# Patient Record
Sex: MALE | Race: WHITE | Employment: OTHER | URBAN - METROPOLITAN AREA
[De-identification: names, ages, dates, MRNs, and addresses within clinical notes are randomized per-mention and may not be internally consistent; named-entity substitution may affect disease eponyms.]

---

## 2017-02-10 ENCOUNTER — APPOINTMENT (OUTPATIENT)
Dept: GENERAL RADIOLOGY | Age: 72
End: 2017-02-10
Attending: EMERGENCY MEDICINE
Payer: MEDICARE

## 2017-02-10 ENCOUNTER — HOSPITAL ENCOUNTER (EMERGENCY)
Age: 72
Discharge: HOME | End: 2017-02-10
Attending: EMERGENCY MEDICINE
Payer: MEDICARE

## 2017-02-10 VITALS
WEIGHT: 170 LBS | TEMPERATURE: 97.6 F | DIASTOLIC BLOOD PRESSURE: 76 MMHG | BODY MASS INDEX: 23.8 KG/M2 | RESPIRATION RATE: 16 BRPM | HEIGHT: 71 IN | OXYGEN SATURATION: 97 % | HEART RATE: 98 BPM | SYSTOLIC BLOOD PRESSURE: 107 MMHG

## 2017-02-10 DIAGNOSIS — S39.012A LUMBAR STRAIN, INITIAL ENCOUNTER: Primary | ICD-10-CM

## 2017-02-10 DIAGNOSIS — M54.32 SCIATICA OF LEFT SIDE: ICD-10-CM

## 2017-02-10 PROCEDURE — 73502 X-RAY EXAM HIP UNI 2-3 VIEWS: CPT

## 2017-02-10 PROCEDURE — 74011636637 HC RX REV CODE- 636/637: Performed by: EMERGENCY MEDICINE

## 2017-02-10 PROCEDURE — 99283 EMERGENCY DEPT VISIT LOW MDM: CPT | Performed by: EMERGENCY MEDICINE

## 2017-02-10 PROCEDURE — 72100 X-RAY EXAM L-S SPINE 2/3 VWS: CPT

## 2017-02-10 RX ORDER — PREDNISONE 20 MG/1
60 TABLET ORAL
Status: COMPLETED | OUTPATIENT
Start: 2017-02-10 | End: 2017-02-10

## 2017-02-10 RX ORDER — PREDNISONE 20 MG/1
40 TABLET ORAL DAILY
Qty: 10 TAB | Refills: 0 | Status: SHIPPED | OUTPATIENT
Start: 2017-02-10 | End: 2017-02-15

## 2017-02-10 RX ADMIN — PREDNISONE 60 MG: 20 TABLET ORAL at 17:34

## 2017-02-10 NOTE — ED TRIAGE NOTES
Pt in c/o lower back pain radiating into left leg after slip and fall. Pt states multiple falls recently. States history of multiple surgeries. States told to come to ed by orthopedic surgeon for evaluation. Denies loc.

## 2017-02-10 NOTE — ED PROVIDER NOTES
HPI Comments: 67-year-old male presents with an exacerbation of left lower back pain. Has a history of multiple falls. He is had 7 back surgeries including fusions and spinal rodding. Patient states that he had to accidental falls in the last week, one where he slipped on water from a dog's bowl another when he lost his balance while trying to tie his shoe. He has a history of a right foot drop with 100%, nerve damage on that side which is chronic, uses his cane mostly and occasionally will use a walker. On the advice of his surgeon. He was sent to the ER for evaluation of his pain today. He reports that he has had some radicular symptoms after the fall 4 days ago but these have slowly improved. Patient is a 80 y.o. male presenting with back pain. The history is provided by the patient. Back Pain    This is a recurrent problem. The current episode started more than 2 days ago. The problem has been gradually improving. The problem occurs constantly. Patient reports not work related injury. The pain is associated with a fall. The pain is present in the lumbar spine, lower back and left side. The quality of the pain is described as shooting and aching. The pain radiates to the left thigh. The pain is moderate. The symptoms are aggravated by certain positions. The pain is the same all the time. Associated symptoms include tingling. Pertinent negatives include no chest pain, no fever, no numbness, no weight loss, no headaches, no abdominal pain, no abdominal swelling, no perianal numbness, no bladder incontinence, no dysuria and no paresis. He has tried analgesics for the symptoms. Risk factors include a history of cancer. The patient's surgical history includes laminectomy and spinal fusion.        Past Medical History:   Diagnosis Date    Allergic rhinitis, cause unspecified 3/8/2013    CAD (coronary artery disease) 1/3/2013     MI age 62- no intervention; affirms SOB with 1 flight of steps    Cancer [facility])     Chronic pain      lumbar pain    Dizziness 3/8/2013    Esophageal stenosis 8/16/2013     dxed on EGD early 2000's. Never dilated    Esophagitis 1/3/2013    GERD with esophagitis      not completely controlled with zantac    HTN (hypertension) 1/3/2013     controlled with med    Kidney stones 1/3/2013    Narcolepsy 1/3/2013    Prostate cancer ([address] 1/3/2013       Past Surgical History:   Procedure Laterality Date    Hx carpal tunnel release      Hx cholecystectomy  2008?  Hx lithotripsy      Hx prostatectomy  2008? Dr. Gordon Palma Hx orthopaedic  2004     carpel tunnel/left hand    Hx orthopaedic  1995     carpel tunnel/right hand    Pr neurological procedure unlisted       Laminectomy    Hx lumbar laminectomy       x6 Dr. Nikos Engle         Family History:   Problem Relation Age of Onset    Depression Mother     Alcohol abuse Father     Depression Sister     Depression Brother     Depression Brother     Depression Sister     Depression Sister        Social History     Social History    Marital status:      Spouse name: N/A    Number of children: N/A    Years of education: N/A     Occupational History    Not on file. 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 Review of Systems   Constitutional: Negative for activity change, chills, diaphoresis, fever and weight loss. HENT: Negative for dental problem, hearing loss, nosebleeds, rhinorrhea and sore throat.     Eyes: Negative for pain, discharge, redness and visual disturbance. Respiratory: Negative for cough, chest tightness and shortness of breath. Cardiovascular: Negative for chest pain, palpitations and leg swelling. Gastrointestinal: Negative for abdominal pain, constipation, diarrhea, nausea and vomiting. Endocrine: Negative for cold intolerance, heat intolerance, polydipsia and polyuria. Genitourinary: Negative for bladder incontinence, dysuria and flank pain. Musculoskeletal: Positive for back pain. Negative for arthralgias, joint swelling, myalgias and neck pain. Skin: Negative for pallor and rash. Allergic/Immunologic: Negative for environmental allergies and food allergies. Neurological: Positive for tingling. Negative for dizziness, tremors, light-headedness, numbness and headaches. Hematological: Negative for adenopathy. Does not bruise/bleed easily. Psychiatric/Behavioral: Negative for confusion and dysphoric mood. The patient is not nervous/anxious and is not hyperactive. All other systems reviewed and are negative. Vitals:    02/10/17 1544 02/10/17 1736   BP: 104/77 107/76   Pulse: 82 98   Resp: 19 16   Temp: 98.1 °F (36.7 °C) 97.6 °F (36.4 °C)   SpO2: 96% 97%   Weight: 77.1 kg (170 lb)    Height: 5' 11\" (1.803 m)             Physical Exam   Constitutional: He is oriented to person, place, and time. He appears well-developed and well-nourished. He appears distressed. HENT:   Head: Normocephalic and atraumatic. Mouth/Throat: Oropharynx is clear and moist.   Eyes: Conjunctivae and EOM are normal. Pupils are equal, round, and reactive to light. Right eye exhibits no discharge. Left eye exhibits no discharge. No scleral icterus. Neck: Normal range of motion. Neck supple. No JVD present. Cardiovascular: Normal rate, regular rhythm, normal heart sounds and intact distal pulses. Exam reveals no gallop and no friction rub. No murmur heard.   Pulmonary/Chest: Effort normal and breath sounds normal. No respiratory distress. He has no wheezes. Abdominal: Soft. Bowel sounds are normal. He exhibits no distension. There is no tenderness. There is no rebound and no guarding. Musculoskeletal: Normal range of motion. He exhibits no edema or tenderness. Back:    Lymphadenopathy:     He has no cervical adenopathy. Neurological: He is alert and oriented to person, place, and time. He has normal strength. No cranial nerve deficit or sensory deficit. He exhibits normal muscle tone. GCS eye subscore is 4. GCS verbal subscore is 5. GCS motor subscore is 6. Patient has a chronic right foot drop. He remains in a brace on that side. The left leg shows mild decrease in sensation laterally  He has no foot drop on the left. His EHL is intact and strong    Distal pulses are strong   Skin: Skin is warm and dry. No rash noted. He is not diaphoretic. No erythema. Psychiatric: He has a normal mood and affect. His speech is normal and behavior is normal. Judgment and thought content normal. Cognition and memory are normal.   Nursing note and vitals reviewed. MDM  Number of Diagnoses or Management Options  Lumbar strain, initial encounter: new and requires workup  Sciatica of left side: established and worsening  Diagnosis management comments: Sciatica versus fracture versus hardware displacement    Consider left hip fracture as well. Radiographs are negative per radiology's reading. We will start short course of steroids to help with inflammation       Amount and/or Complexity of Data Reviewed  Tests in the radiology section of CPT®: ordered and reviewed  Tests in the medicine section of CPT®: ordered and reviewed  Review and summarize past medical records: yes    Risk of Complications, Morbidity, and/or Mortality  Presenting problems: moderate  Diagnostic procedures: moderate  Management options: low  General comments: Elements of this note have been dictated via voice recognition software.   Text and phrases may be limited by the accuracy of the software. The chart has been reviewed, but errors may still be present.       Patient Progress  Patient progress: stable    ED Course       Procedures

## 2017-02-10 NOTE — DISCHARGE INSTRUCTIONS
Sciatica: Care Instructions  Your Care Instructions    Sciatica (say \"fym-WT-tn-kuh\") is an irritation of one of the sciatic nerves, which come from the spinal cord in the lower back. The sciatic nerves and their branches extend down through the buttock to the foot. Sciatica can develop when an injured disc in the back presses against a spinal nerve root. Its main symptom is pain, numbness, or weakness that is often worse in the leg or foot than in the back. Sciatica often will improve and go away with time. Early treatment usually includes medicines and exercises to relieve pain. Follow-up care is a key part of your treatment and safety. Be sure to make and go to all appointments, and call your doctor if you are having problems. It's also a good idea to know your test results and keep a list of the medicines you take. How can you care for yourself at home? · Take pain medicines exactly as directed. ¨ If the doctor gave you a prescription medicine for pain, take it as prescribed. ¨ If you are not taking a prescription pain medicine, ask your doctor if you can take an over-the-counter medicine. · Use heat or ice to relieve pain. ¨ To apply heat, put a warm water bottle, heating pad set on low, or warm cloth on your back. Do not go to sleep with a heating pad on your skin. ¨ To use ice, put ice or a cold pack on the area for 10 to 20 minutes at a time. Put a thin cloth between the ice and your skin. · Avoid sitting if possible, unless it feels better than standing. · Alternate lying down with short walks. Increase your walking distance as you are able to without making your symptoms worse. · Do not do anything that makes your symptoms worse. When should you call for help? Call 911 anytime you think you may need emergency care. For example, call if:  · You are unable to move a leg at all.   Call your doctor now or seek immediate medical care if:  · You have new or worse symptoms in your legs or buttocks. Symptoms may include:  ¨ Numbness or tingling. ¨ Weakness. ¨ Pain. · You lose bladder or bowel control. Watch closely for changes in your health, and be sure to contact your doctor if:  · You are not getting better as expected. Where can you learn more? Go to http://oracio-gage.info/. Enter [phone] in the search box to learn more about \"Sciatica: Care Instructions. \"  Current as of: May 23, 2016  Content Version: 11.1  © 3079-8586 [email or web address]. Care instructions adapted under license by [employer] (which disclaims liability or warranty for this information). If you have questions about a medical condition or this instruction, always ask your healthcare professional. [address] any warranty or liability for your use of this information.

## 2017-02-23 PROCEDURE — 99283 EMERGENCY DEPT VISIT LOW MDM: CPT | Performed by: EMERGENCY MEDICINE

## 2017-02-24 ENCOUNTER — HOSPITAL ENCOUNTER (EMERGENCY)
Age: 72
Discharge: HOME | End: 2017-02-24
Attending: EMERGENCY MEDICINE
Payer: MEDICARE

## 2017-02-24 VITALS
DIASTOLIC BLOOD PRESSURE: 69 MMHG | RESPIRATION RATE: 16 BRPM | BODY MASS INDEX: 24.08 KG/M2 | SYSTOLIC BLOOD PRESSURE: 146 MMHG | WEIGHT: 172 LBS | OXYGEN SATURATION: 100 % | HEIGHT: 71 IN | TEMPERATURE: 98.2 F | HEART RATE: 95 BPM
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
## 2017-02-24 LAB
ATRIAL RATE: 94 BPM
CALCULATED P AXIS, ECG09: 57 DEGREES
CALCULATED R AXIS, ECG10: 61 DEGREES
CALCULATED T AXIS, ECG11: 14 DEGREES
DIAGNOSIS, 93000: NORMAL
DIASTOLIC BP, ECG02: NORMAL MMHG
P-R INTERVAL, ECG05: 140 MS
Q-T INTERVAL, ECG07: 358 MS
QRS DURATION, ECG06: 80 MS
QTC CALCULATION (BEZET), ECG08: 447 MS
SYSTOLIC BP, ECG01: NORMAL MMHG
VENTRICULAR RATE, ECG03: 94 BPM

## 2017-02-24 PROCEDURE — 93005 ELECTROCARDIOGRAM TRACING: CPT | Performed by: EMERGENCY MEDICINE

## 2017-02-24 NOTE — DISCHARGE INSTRUCTIONS
Return with any fevers, vomiting, weakness, numbness, bowel or bladder problems, worsening symptoms, or additional concerns. Follow up with your regular doctor in 5-7 days for reevaluation.

## 2017-02-24 NOTE — ED NOTES
Pt states he had his 7th back surgery in October and fell several times since. All xrays were negative. Was put on prednisone.

## 2017-02-24 NOTE — ED TRIAGE NOTES
Pt presents to ER for heaviness to L LE since this am, he feels as though he can't pick his foot up very well. Recently had back surgery. Pt has been taking his routine meds, spoke to home health RN who recommended ER eval.  Pt states that he has been having CP x 4 hrs as well, pt dismisses this pain stating that it is \"typically gas\".

## 2017-02-24 NOTE — ED PROVIDER NOTES
HPI Comments: 73-year-old woman with a history of pain in his left shin after a fall earlier this evening. Patient was concerned because he has a history of multiple back surgeries and he was worried that he may have reinjured himself. Patient says he is not actually having any back pain nor is he having any weakness, numbness, or tingling. Patient said that he called his primary care doctor who advised him to go to the [facility] department to get evaluated. Patient says yes she saw the primary care doctor earlier today and got a prescription for some steroids. Patient says he then fell after that because he was trying to walk without his cane. He denies any specific injury. Patient told the triage nurse that he was having chest pain but he told me that that is gone and is not actually bothering him. Patient said the pain he had is normal for him and it frequently comes and goes. Patient also expressed concerns that over the last several days he has started to feel like he is getting some left foot drop. Patient has a history of foot drop on his right side and he wears a brace for that. Patient said that is what prompted him to go to his primary care doctor today to get the steroids. Patient said that is not any worse after the fall. Elements of this note were made using speech recognition software. As such, errors of speech recognition may occur. Patient is a 80 y.o. male presenting with leg pain. The history is provided by the patient. Leg Pain           Past Medical History:   Diagnosis Date    Allergic rhinitis, cause unspecified 3/8/2013    CAD (coronary artery disease) 1/3/2013    MI age 62- no intervention; affirms SOB with 1 flight of steps    Cancer [facility])     Chronic pain     lumbar pain    Dizziness 3/8/2013    Esophageal stenosis 8/16/2013    dxed on EGD early 2000's.   Never dilated    Esophagitis 1/3/2013    GERD with esophagitis     not completely controlled with zantac    HTN (hypertension) 1/3/2013    controlled with med    Kidney stones 1/3/2013    Narcolepsy 1/3/2013    Prostate cancer ([facility] [address] 1/3/2013       Past Surgical History:   Procedure Laterality Date    HX CARPAL TUNNEL RELEASE      HX CHOLECYSTECTOMY  2008?  HX LITHOTRIPSY      HX LUMBAR LAMINECTOMY      x6 Dr. Mario Casper HX ORTHOPAEDIC  2004    carpel tunnel/left hand    HX ORTHOPAEDIC  1995    carpel tunnel/right hand    HX PROSTATECTOMY  2008? Dr. Underwood [employer]      Laminectomy         Family History:   Problem Relation Age of Onset    Depression Mother     Alcohol abuse Father     Depression Sister     Depression Brother     Depression Brother     Depression Sister     Depression Sister        Social History     Social History    Marital status:      Spouse name: N/A    Number of children: N/A    Years of education: N/A     Occupational History    Not on file. Social History Main Topics    Smoking status: Never Smoker    Smokeless tobacco: Never Used    Alcohol use No    Drug use: No    Sexual activity: Not on file     Other Topics Concern     Service Yes     reserves - 8 m 1966    Blood Transfusions No    Caffeine Concern Yes     3 pepsis a day    Special Diet No    Exercise Yes     yard work, does some in the bed for neck   [place] Yes     Social History Narrative         ALLERGIES: Review of patient's allergies indicates no known allergies. Review of Systems   Constitutional: Negative for chills, diaphoresis and fever. HENT: Negative for congestion, rhinorrhea and sore throat. Eyes: Negative for redness and visual disturbance. Respiratory: Negative for cough, chest tightness, shortness of breath and wheezing. Cardiovascular: Negative for chest pain and palpitations. Gastrointestinal: Negative for abdominal pain, blood in stool, diarrhea, nausea and vomiting. Endocrine: Negative for polydipsia and polyuria. Genitourinary: Negative for dysuria and hematuria. Musculoskeletal: Negative for arthralgias, myalgias and neck stiffness. Skin: Negative for rash. Allergic/Immunologic: Negative for environmental allergies and food allergies. Neurological: Negative for dizziness, weakness and headaches. Left foot drop   Hematological: Negative for adenopathy. Does not bruise/bleed easily. Psychiatric/Behavioral: Negative for confusion and sleep disturbance. The patient is not nervous/anxious. Vitals:    02/23/17 2359   BP: 155/77   Pulse: 99   Resp: 16   SpO2: 99%   Weight: 78 kg (172 lb)   Height: 5' 11\" (1.803 m)            Physical Exam   Constitutional: He is oriented to person, place, and time. He appears well-developed and well-nourished. HENT:   Head: Normocephalic and atraumatic. Eyes: Conjunctivae and EOM are normal. Pupils are equal, round, and reactive to light. Neck: Normal range of motion. Cardiovascular: Normal rate and regular rhythm. Pulmonary/Chest: Effort normal and breath sounds normal. No respiratory distress. He has no wheezes. He has no rales. He exhibits no tenderness. Abdominal: Soft. Bowel sounds are normal. There is no rebound and no guarding. Musculoskeletal: Normal range of motion. He exhibits no edema or tenderness. Lymphadenopathy:     He has no cervical adenopathy. Neurological: He is alert and oriented to person, place, and time. He has normal reflexes. Patient's left patella and Achilles reflexes are normal.  He has normal vibratory sensation in his left knee and left ankle. He had maybe some slight weakness with dorsiflexion of his left foot but I could not appreciate any significant foot drop. Skin: Skin is warm and dry. Psychiatric: He has a normal mood and affect. Nursing note and vitals reviewed.        MDM  Number of Diagnoses or Management Options  Diagnosis management comments: Patient is oriented the process of being evaluated for his potential foot drop by his primary care doctor. His symptoms tonight are not significant enough that he needs an MRI and he can continue to see if he has a response to the steroids. I will plan to discharge him home.     ED Course       Procedures

## 2017-03-09 ENCOUNTER — HOSPITAL ENCOUNTER (OUTPATIENT)
Dept: MRI IMAGING | Age: 72
Discharge: HOME | End: 2017-03-09
Attending: FAMILY MEDICINE
Payer: MEDICARE

## 2017-03-09 DIAGNOSIS — M21.372 FOOT DROP, LEFT: ICD-10-CM

## 2017-03-09 DIAGNOSIS — M54.40 BACK PAIN OF LUMBAR REGION WITH SCIATICA: ICD-10-CM

## 2017-03-09 DIAGNOSIS — R29.898 WEAKNESS OF FOOT, LEFT: ICD-10-CM

## 2017-03-09 LAB — CREAT BLD-MCNC: 1.8 MG/DL (ref 0.6–1)

## 2017-03-09 PROCEDURE — A9577 INJ MULTIHANCE: HCPCS | Performed by: FAMILY MEDICINE

## 2017-03-09 PROCEDURE — 74011250636 HC RX REV CODE- 250/636: Performed by: FAMILY MEDICINE

## 2017-03-09 PROCEDURE — 72158 MRI LUMBAR SPINE W/O & W/DYE: CPT

## 2017-03-09 PROCEDURE — 82565 ASSAY OF CREATININE: CPT

## 2017-03-09 RX ORDER — SODIUM CHLORIDE 0.9 % (FLUSH) 0.9 %
10 SYRINGE (ML) INJECTION
Status: COMPLETED | OUTPATIENT
Start: 2017-03-09 | End: 2017-03-09

## 2017-03-09 RX ADMIN — Medication 10 ML: at 17:37

## 2017-03-09 RX ADMIN — GADOBENATE DIMEGLUMINE 7.5 ML: 529 INJECTION, SOLUTION INTRAVENOUS at 17:37

## 2017-03-14 PROBLEM — F32.A DEPRESSION: Status: ACTIVE | Noted: 2017-03-14

## 2017-03-14 PROBLEM — R07.9 CHEST PAIN: Status: ACTIVE | Noted: 2017-03-14

## 2017-03-14 PROBLEM — I51.7 CARDIOMEGALY: Status: ACTIVE | Noted: 2017-03-14

## 2017-03-20 NOTE — PROGRESS NOTES
His kidney function was slightly worse after his last scan. We should repeat that in another 3 months. How is he feeling?

## 2017-03-23 NOTE — PROGRESS NOTES
Pt has been in bed, dr Darrel Mercer ordered a nerve conduction test 3-31-17. Pt given response from dr villagran. Pt voiced understanding.

## 2017-04-29 PROBLEM — Z00.00 PREVENTATIVE HEALTH CARE: Status: ACTIVE | Noted: 2017-04-29

## 2017-05-26 ENCOUNTER — HOSPITAL ENCOUNTER (EMERGENCY)
Age: 72
Discharge: HOME | End: 2017-05-26
Attending: EMERGENCY MEDICINE
Payer: MEDICARE

## 2017-05-26 ENCOUNTER — APPOINTMENT (OUTPATIENT)
Dept: GENERAL RADIOLOGY | Age: 72
End: 2017-05-26
Attending: EMERGENCY MEDICINE
Payer: MEDICARE

## 2017-05-26 VITALS
BODY MASS INDEX: 24.64 KG/M2 | WEIGHT: 176 LBS | SYSTOLIC BLOOD PRESSURE: 129 MMHG | OXYGEN SATURATION: 99 % | DIASTOLIC BLOOD PRESSURE: 80 MMHG | TEMPERATURE: 98 F | HEART RATE: 56 BPM | HEIGHT: 71 IN | RESPIRATION RATE: 22 BRPM

## 2017-05-26 DIAGNOSIS — R07.9 CHEST PAIN, UNSPECIFIED TYPE: Primary | ICD-10-CM

## 2017-05-26 DIAGNOSIS — E87.6 HYPOKALEMIA: ICD-10-CM

## 2017-05-26 DIAGNOSIS — E83.42 HYPOMAGNESEMIA: ICD-10-CM

## 2017-05-26 DIAGNOSIS — I25.119 CORONARY ARTERY DISEASE INVOLVING NATIVE HEART WITH ANGINA PECTORIS, UNSPECIFIED VESSEL OR LESION TYPE (HCC): ICD-10-CM

## 2017-05-26 LAB
ANION GAP BLD CALC-SCNC: 5 MMOL/L (ref 7–16)
ATRIAL RATE: 59 BPM
BASOPHILS # BLD AUTO: 0 K/UL (ref 0–0.2)
BASOPHILS # BLD: 1 % (ref 0–2)
BNP SERPL-MCNC: 18 PG/ML
BUN SERPL-MCNC: 14 MG/DL (ref 8–23)
CALCIUM SERPL-MCNC: 9.3 MG/DL (ref 8.3–10.4)
CALCULATED P AXIS, ECG09: 77 DEGREES
CALCULATED R AXIS, ECG10: 54 DEGREES
CALCULATED T AXIS, ECG11: 153 DEGREES
CHLORIDE SERPL-SCNC: 104 MMOL/L (ref 98–107)
CO2 SERPL-SCNC: 32 MMOL/L (ref 21–32)
CREAT SERPL-MCNC: 1.26 MG/DL (ref 0.8–1.5)
DIAGNOSIS, 93000: NORMAL
DIFFERENTIAL METHOD BLD: ABNORMAL
EOSINOPHIL # BLD: 0.2 K/UL (ref 0–0.8)
EOSINOPHIL NFR BLD: 3 % (ref 0.5–7.8)
ERYTHROCYTE [DISTWIDTH] IN BLOOD BY AUTOMATED COUNT: 14.8 % (ref 11.9–14.6)
GLUCOSE SERPL-MCNC: 96 MG/DL (ref 65–100)
HCT VFR BLD AUTO: 35.9 % (ref 41.1–50.3)
HGB BLD-MCNC: 11.9 G/DL (ref 13.6–17.2)
IMM GRANULOCYTES # BLD: 0 K/UL (ref 0–0.5)
IMM GRANULOCYTES NFR BLD AUTO: 0.2 % (ref 0–5)
LACTATE BLD-SCNC: 1.2 MMOL/L (ref 0.5–1.9)
LYMPHOCYTES # BLD AUTO: 48 % (ref 13–44)
LYMPHOCYTES # BLD: 2.6 K/UL (ref 0.5–4.6)
MAGNESIUM SERPL-MCNC: 1.3 MG/DL (ref 1.8–2.4)
MCH RBC QN AUTO: 29.5 PG (ref 26.1–32.9)
MCHC RBC AUTO-ENTMCNC: 33.1 G/DL (ref 31.4–35)
MCV RBC AUTO: 89.1 FL (ref 79.6–97.8)
MONOCYTES # BLD: 0.6 K/UL (ref 0.1–1.3)
MONOCYTES NFR BLD AUTO: 10 % (ref 4–12)
NEUTS SEG # BLD: 2 K/UL (ref 1.7–8.2)
NEUTS SEG NFR BLD AUTO: 38 % (ref 43–78)
P-R INTERVAL, ECG05: 164 MS
PLATELET # BLD AUTO: 217 K/UL (ref 150–450)
PMV BLD AUTO: 9.2 FL (ref 10.8–14.1)
POTASSIUM SERPL-SCNC: 2.3 MMOL/L (ref 3.5–5.1)
Q-T INTERVAL, ECG07: 338 MS
QRS DURATION, ECG06: 78 MS
QTC CALCULATION (BEZET), ECG08: 334 MS
RBC # BLD AUTO: 4.03 M/UL (ref 4.23–5.67)
SODIUM SERPL-SCNC: 141 MMOL/L (ref 136–145)
TROPONIN I BLD-MCNC: 0 NG/ML (ref 0–0.08)
TROPONIN I BLD-MCNC: 0 NG/ML (ref 0–0.08)
VENTRICULAR RATE, ECG03: 59 BPM
WBC # BLD AUTO: 5.4 K/UL (ref 4.3–11.1)

## 2017-05-26 PROCEDURE — 96365 THER/PROPH/DIAG IV INF INIT: CPT | Performed by: EMERGENCY MEDICINE

## 2017-05-26 PROCEDURE — 83735 ASSAY OF MAGNESIUM: CPT | Performed by: EMERGENCY MEDICINE

## 2017-05-26 PROCEDURE — 74011250637 HC RX REV CODE- 250/637: Performed by: EMERGENCY MEDICINE

## 2017-05-26 PROCEDURE — 96368 THER/DIAG CONCURRENT INF: CPT | Performed by: EMERGENCY MEDICINE

## 2017-05-26 PROCEDURE — 71010 XR CHEST PORT: CPT

## 2017-05-26 PROCEDURE — 83880 ASSAY OF NATRIURETIC PEPTIDE: CPT | Performed by: EMERGENCY MEDICINE

## 2017-05-26 PROCEDURE — 99285 EMERGENCY DEPT VISIT HI MDM: CPT | Performed by: EMERGENCY MEDICINE

## 2017-05-26 PROCEDURE — 84484 ASSAY OF TROPONIN QUANT: CPT

## 2017-05-26 PROCEDURE — 74011250636 HC RX REV CODE- 250/636: Performed by: EMERGENCY MEDICINE

## 2017-05-26 PROCEDURE — 80048 BASIC METABOLIC PNL TOTAL CA: CPT | Performed by: EMERGENCY MEDICINE

## 2017-05-26 PROCEDURE — 83605 ASSAY OF LACTIC ACID: CPT

## 2017-05-26 PROCEDURE — 96366 THER/PROPH/DIAG IV INF ADDON: CPT | Performed by: EMERGENCY MEDICINE

## 2017-05-26 PROCEDURE — 93005 ELECTROCARDIOGRAM TRACING: CPT | Performed by: EMERGENCY MEDICINE

## 2017-05-26 PROCEDURE — 85025 COMPLETE CBC W/AUTO DIFF WBC: CPT | Performed by: EMERGENCY MEDICINE

## 2017-05-26 RX ORDER — POTASSIUM CHLORIDE 20 MEQ/1
40 TABLET, EXTENDED RELEASE ORAL
Status: COMPLETED | OUTPATIENT
Start: 2017-05-26 | End: 2017-05-26

## 2017-05-26 RX ORDER — LANOLIN ALCOHOL/MO/W.PET/CERES
400 CREAM (GRAM) TOPICAL ONCE
Status: COMPLETED | OUTPATIENT
Start: 2017-05-26 | End: 2017-05-26

## 2017-05-26 RX ORDER — SODIUM CHLORIDE 9 MG/ML
50-150 INJECTION, SOLUTION INTRAVENOUS CONTINUOUS
Status: DISCONTINUED | OUTPATIENT
Start: 2017-05-26 | End: 2017-05-26 | Stop reason: HOSPADM

## 2017-05-26 RX ORDER — POTASSIUM CHLORIDE 20MEQ/15ML
40 LIQUID (ML) ORAL
Status: COMPLETED | OUTPATIENT
Start: 2017-05-26 | End: 2017-05-26

## 2017-05-26 RX ORDER — LANOLIN ALCOHOL/MO/W.PET/CERES
400 CREAM (GRAM) TOPICAL 2 TIMES DAILY
Qty: 8 TAB | Refills: 0 | Status: SHIPPED | OUTPATIENT
Start: 2017-05-26 | End: 2017-05-30

## 2017-05-26 RX ORDER — MAGNESIUM SULFATE HEPTAHYDRATE 40 MG/ML
2 INJECTION, SOLUTION INTRAVENOUS
Status: COMPLETED | OUTPATIENT
Start: 2017-05-26 | End: 2017-05-26

## 2017-05-26 RX ORDER — POTASSIUM CHLORIDE 750 MG/1
20 TABLET, FILM COATED, EXTENDED RELEASE ORAL 2 TIMES DAILY
Qty: 12 TAB | Refills: 0 | Status: SHIPPED | OUTPATIENT
Start: 2017-05-26 | End: 2017-06-09 | Stop reason: SDUPTHER

## 2017-05-26 RX ORDER — POTASSIUM CHLORIDE 14.9 MG/ML
20 INJECTION INTRAVENOUS
Status: COMPLETED | OUTPATIENT
Start: 2017-05-26 | End: 2017-05-26

## 2017-05-26 RX ADMIN — SODIUM CHLORIDE 75 ML/HR: 900 INJECTION, SOLUTION INTRAVENOUS at 04:35

## 2017-05-26 RX ADMIN — POTASSIUM CHLORIDE 40 MEQ: 20 TABLET, EXTENDED RELEASE ORAL at 04:31

## 2017-05-26 RX ADMIN — POTASSIUM CHLORIDE 40 MEQ: 20 SOLUTION ORAL at 04:31

## 2017-05-26 RX ADMIN — MAGNESIUM SULFATE IN WATER 2 G: 40 INJECTION, SOLUTION INTRAVENOUS at 04:36

## 2017-05-26 RX ADMIN — Medication 400 MG: at 04:31

## 2017-05-26 RX ADMIN — POTASSIUM CHLORIDE 20 MEQ: 200 INJECTION, SOLUTION INTRAVENOUS at 04:36

## 2017-05-26 NOTE — ED NOTES
Report to Andie Moraes RN. Care transferred at this time. Patient continues to remain on cycling vital signs and is in no acute distress. Bedside Shift Report Completed.

## 2017-05-26 NOTE — ED TRIAGE NOTES
Pt c/o cp -2 - for 4 hour, states bp at home high 180's /120's. Had stress test few weeks ago and told to return with any cp for possible heart cath.  Pt took 3 baby asa and 1 nitro on route to ed without change in  pain

## 2017-05-26 NOTE — ED PROVIDER NOTES
Patient is a 80 y.o. male presenting with chest pain. The history is provided by the patient. Chest Pain (Angina)    This is a new problem. The current episode started yesterday. The problem has been resolved. The pain is associated with normal activity. The pain is present in the substernal region and left side. The pain is mild. The quality of the pain is described as pressure-like. The pain does not radiate. Pertinent negatives include no abdominal pain, no cough, no diaphoresis, no dizziness, no exertional chest pressure, no fever, no headaches, no malaise/fatigue, no nausea, no near-syncope, no palpitations, no shortness of breath and no vomiting. He has tried nitroglycerin and aspirin for the symptoms. The treatment provided moderate relief. Risk factors include cardiac disease and hypertension. Procedural history includes cardiac catheterization. Pertinent negatives include no cardiac stents and no angioplasty. Past Medical History:   Diagnosis Date    Allergic rhinitis, cause unspecified 3/8/2013    CAD (coronary artery disease) 1/3/2013    MI age 62- no intervention; affirms SOB with 1 flight of steps    Cancer [facility])     Chest pain     Chronic pain     lumbar pain    Depression     Dizziness 3/8/2013    Dyslipidemia     Esophageal stenosis 8/16/2013    dxed on EGD early 2000's. Never dilated    Esophagitis 1/3/2013    GERD with esophagitis     not completely controlled with zantac    HTN (hypertension) 1/3/2013    controlled with med    Kidney stones 1/3/2013    Narcolepsy 1/3/2013    Prostate cancer ([facility] [address] 1/3/2013    Vertigo, benign positional        Past Surgical History:   Procedure Laterality Date    HX CARPAL TUNNEL RELEASE      HX CHOLECYSTECTOMY  2008?  HX LITHOTRIPSY      HX LUMBAR LAMINECTOMY      x6 Dr. Lilia Mancia HX ORTHOPAEDIC  2004    carpel tunnel/left hand    HX ORTHOPAEDIC  1995    carpel tunnel/right hand    HX PROSTATECTOMY  2008?     Dr. Chelo Sotelo NEUROLOGICAL PROCEDURE UNLISTED      Laminectomy         Family History:   Problem Relation Age of Onset    Depression Mother     Alcohol abuse Father     Depression Sister     Depression Brother     Depression Brother     Depression Sister     Depression Sister        Social History     Social History    Marital status:      Spouse name: N/A    Number of children: N/A    Years of education: N/A     Occupational History    Not on file. Social History Main Topics    Smoking status: Never Smoker    Smokeless tobacco: Never Used    Alcohol use No    Drug use: No    Sexual activity: Not on file     Other Topics Concern     Service Yes     reserves - 8 m 1966    Blood Transfusions No    Caffeine Concern Yes     3 pepsis a day    Special Diet No    Exercise Yes     yard work, does some in the bed for neck   [place] Yes     Social History Narrative         ALLERGIES: Review of patient's allergies indicates no known allergies. Review of Systems   Constitutional: Negative for chills, diaphoresis, fever and malaise/fatigue. HENT: Negative for rhinorrhea and sore throat. Eyes: Negative for discharge and redness. Respiratory: Negative for cough and shortness of breath. Cardiovascular: Positive for chest pain. Negative for palpitations and near-syncope. Gastrointestinal: Negative for abdominal pain, nausea and vomiting. Genitourinary: Negative for flank pain. Skin: Negative for rash. Neurological: Negative for dizziness and headaches. All other systems reviewed and are negative. Vitals:    05/26/17 0323 05/26/17 0330   BP: 129/74 118/72   Pulse: 61 64   Resp: 16 16   Temp:  97.8 °F (36.6 °C)   SpO2: 98% 97%   Weight: 79.8 kg (176 lb)    Height: 5' 11\" (1.803 m)             Physical Exam   Constitutional: He is oriented to person, place, and time. He appears well-developed and well-nourished. No distress. HENT:   Head: Normocephalic and atraumatic.    Eyes: Conjunctivae are normal. Pupils are equal, round, and reactive to light. Right eye exhibits no discharge. Left eye exhibits no discharge. No scleral icterus. Neck: Normal range of motion. Neck supple. Cardiovascular: Normal rate, regular rhythm and normal heart sounds. Exam reveals no gallop. No murmur heard. Pulmonary/Chest: Effort normal and breath sounds normal. No respiratory distress. He has no wheezes. He has no rales. Abdominal: Soft. Bowel sounds are normal. There is no tenderness. There is no guarding. Musculoskeletal: Normal range of motion. He exhibits no edema. Neurological: He is alert and oriented to person, place, and time. He exhibits normal muscle tone. cni 2-12 grossly   Skin: Skin is warm and dry. He is not diaphoretic. Psychiatric: He has a normal mood and affect. His behavior is normal.   Nursing note and vitals reviewed. MDM  Number of Diagnoses or Management Options  Chest pain, unspecified type:   Coronary artery disease involving native heart with angina pectoris, unspecified vessel or lesion type:   Diagnosis management comments: Medical decision making note:  Patient will mild chest pain. Was getting much R blood pressure readings at home with his wrist position blood pressure cuff, then he exhibits in triage today. Chest pressure is dull but not accompanied by nausea dyspnea or diaphoresis. Symptoms got better with one nitroglycerin at home. Patient had recent stress test and was reportedly told if he had any chest pain to go to the ER for possible catheter, however he also states that the physician told the stress test \"pretty good\". Patient does have history of prior MI.  EKG tonight shows mild nonspecific ST changes particularly laterally. This concludes the \"medical decision making note\" part of this emergency department visit note.       ED Course       Procedures

## 2017-05-26 NOTE — ED NOTES
Patient complaints of chest pain and blood pressure irregularity tonight. States blood pressure at home 200's over 110's. States took 3 ASA 81mg and a NTG on the way here and is unable to determine if he is experiencing relief from this medication combination.

## 2017-05-26 NOTE — ED NOTES
Bedside report received from [place], [facility].

## 2017-09-29 PROBLEM — E55.9 VITAMIN D DEFICIENCY: Status: ACTIVE | Noted: 2017-09-29

## 2017-11-14 ENCOUNTER — APPOINTMENT (OUTPATIENT)
Dept: CT IMAGING | Age: 72
End: 2017-11-14
Attending: EMERGENCY MEDICINE
Payer: MEDICARE

## 2017-11-14 ENCOUNTER — HOSPITAL ENCOUNTER (OUTPATIENT)
Age: 72
Setting detail: OBSERVATION
Discharge: HOME HEALTH | End: 2017-11-15
Attending: EMERGENCY MEDICINE | Admitting: INTERNAL MEDICINE
Payer: MEDICARE

## 2017-11-14 ENCOUNTER — APPOINTMENT (OUTPATIENT)
Dept: MRI IMAGING | Age: 72
End: 2017-11-14
Attending: INTERNAL MEDICINE
Payer: MEDICARE

## 2017-11-14 ENCOUNTER — HOME HEALTH ADMISSION (OUTPATIENT)
Dept: HOME HEALTH SERVICES | Facility: HOME HEALTH | Age: 72
End: 2017-11-14

## 2017-11-14 DIAGNOSIS — I10 HYPERTENSION, UNSPECIFIED TYPE: ICD-10-CM

## 2017-11-14 DIAGNOSIS — R42 VERTIGO: Primary | ICD-10-CM

## 2017-11-14 DIAGNOSIS — F32.A DEPRESSION, UNSPECIFIED DEPRESSION TYPE: ICD-10-CM

## 2017-11-14 DIAGNOSIS — I10 ESSENTIAL HYPERTENSION WITH GOAL BLOOD PRESSURE LESS THAN 140/90: ICD-10-CM

## 2017-11-14 LAB
ALBUMIN SERPL-MCNC: 3.7 G/DL (ref 3.2–4.6)
ALBUMIN/GLOB SERPL: 1.4 {RATIO} (ref 1.2–3.5)
ALP SERPL-CCNC: 75 U/L (ref 50–136)
ALT SERPL-CCNC: 19 U/L (ref 12–65)
ANION GAP SERPL CALC-SCNC: 12 MMOL/L (ref 7–16)
AST SERPL-CCNC: 20 U/L (ref 15–37)
ATRIAL RATE: 65 BPM
BILIRUB SERPL-MCNC: 0.6 MG/DL (ref 0.2–1.1)
BUN SERPL-MCNC: 16 MG/DL (ref 8–23)
CALCIUM SERPL-MCNC: 9.2 MG/DL (ref 8.3–10.4)
CALCULATED P AXIS, ECG09: 43 DEGREES
CALCULATED R AXIS, ECG10: 14 DEGREES
CALCULATED T AXIS, ECG11: 43 DEGREES
CHLORIDE SERPL-SCNC: 104 MMOL/L (ref 98–107)
CO2 SERPL-SCNC: 26 MMOL/L (ref 21–32)
CREAT SERPL-MCNC: 1.24 MG/DL (ref 0.8–1.5)
DIAGNOSIS, 93000: NORMAL
ERYTHROCYTE [DISTWIDTH] IN BLOOD BY AUTOMATED COUNT: 14.5 % (ref 11.9–14.6)
GLOBULIN SER CALC-MCNC: 2.7 G/DL (ref 2.3–3.5)
GLUCOSE BLD STRIP.AUTO-MCNC: 95 MG/DL (ref 65–100)
GLUCOSE SERPL-MCNC: 102 MG/DL (ref 65–100)
HCT VFR BLD AUTO: 35.1 % (ref 41.1–50.3)
HGB BLD-MCNC: 11.8 G/DL (ref 13.6–17.2)
MCH RBC QN AUTO: 29.6 PG (ref 26.1–32.9)
MCHC RBC AUTO-ENTMCNC: 33.6 G/DL (ref 31.4–35)
MCV RBC AUTO: 88 FL (ref 79.6–97.8)
P-R INTERVAL, ECG05: 164 MS
PLATELET # BLD AUTO: 195 K/UL (ref 150–450)
PMV BLD AUTO: 9.9 FL (ref 10.8–14.1)
POTASSIUM SERPL-SCNC: 2.4 MMOL/L (ref 3.5–5.1)
PROT SERPL-MCNC: 6.4 G/DL (ref 6.3–8.2)
Q-T INTERVAL, ECG07: 454 MS
QRS DURATION, ECG06: 82 MS
QTC CALCULATION (BEZET), ECG08: 472 MS
RBC # BLD AUTO: 3.99 M/UL (ref 4.23–5.67)
SODIUM SERPL-SCNC: 142 MMOL/L (ref 136–145)
TROPONIN I BLD-MCNC: 0 NG/ML (ref 0.02–0.05)
VENTRICULAR RATE, ECG03: 65 BPM
WBC # BLD AUTO: 5.8 K/UL (ref 4.3–11.1)

## 2017-11-14 PROCEDURE — 74011250636 HC RX REV CODE- 250/636: Performed by: INTERNAL MEDICINE

## 2017-11-14 PROCEDURE — G8979 MOBILITY GOAL STATUS: HCPCS

## 2017-11-14 PROCEDURE — 70551 MRI BRAIN STEM W/O DYE: CPT

## 2017-11-14 PROCEDURE — 80053 COMPREHEN METABOLIC PANEL: CPT | Performed by: EMERGENCY MEDICINE

## 2017-11-14 PROCEDURE — 99218 HC RM OBSERVATION: CPT

## 2017-11-14 PROCEDURE — 74011250636 HC RX REV CODE- 250/636: Performed by: EMERGENCY MEDICINE

## 2017-11-14 PROCEDURE — 96375 TX/PRO/DX INJ NEW DRUG ADDON: CPT | Performed by: EMERGENCY MEDICINE

## 2017-11-14 PROCEDURE — 97161 PT EVAL LOW COMPLEX 20 MIN: CPT

## 2017-11-14 PROCEDURE — 84484 ASSAY OF TROPONIN QUANT: CPT

## 2017-11-14 PROCEDURE — 93005 ELECTROCARDIOGRAM TRACING: CPT | Performed by: EMERGENCY MEDICINE

## 2017-11-14 PROCEDURE — 70450 CT HEAD/BRAIN W/O DYE: CPT

## 2017-11-14 PROCEDURE — 96365 THER/PROPH/DIAG IV INF INIT: CPT | Performed by: EMERGENCY MEDICINE

## 2017-11-14 PROCEDURE — 82962 GLUCOSE BLOOD TEST: CPT

## 2017-11-14 PROCEDURE — 99285 EMERGENCY DEPT VISIT HI MDM: CPT | Performed by: EMERGENCY MEDICINE

## 2017-11-14 PROCEDURE — 96366 THER/PROPH/DIAG IV INF ADDON: CPT | Performed by: EMERGENCY MEDICINE

## 2017-11-14 PROCEDURE — 96375 TX/PRO/DX INJ NEW DRUG ADDON: CPT

## 2017-11-14 PROCEDURE — G8978 MOBILITY CURRENT STATUS: HCPCS

## 2017-11-14 PROCEDURE — 85027 COMPLETE CBC AUTOMATED: CPT | Performed by: EMERGENCY MEDICINE

## 2017-11-14 PROCEDURE — G8980 MOBILITY D/C STATUS: HCPCS

## 2017-11-14 PROCEDURE — 77030027138 HC INCENT SPIROMETER -A

## 2017-11-14 PROCEDURE — 74011250637 HC RX REV CODE- 250/637: Performed by: INTERNAL MEDICINE

## 2017-11-14 RX ORDER — MECLIZINE HCL 12.5 MG 12.5 MG/1
25 TABLET ORAL
Status: DISCONTINUED | OUTPATIENT
Start: 2017-11-14 | End: 2017-11-15 | Stop reason: HOSPADM

## 2017-11-14 RX ORDER — AMLODIPINE BESYLATE 10 MG/1
10 TABLET ORAL DAILY
Status: DISCONTINUED | OUTPATIENT
Start: 2017-11-15 | End: 2017-11-15

## 2017-11-14 RX ORDER — SODIUM CHLORIDE 0.9 % (FLUSH) 0.9 %
5-10 SYRINGE (ML) INJECTION AS NEEDED
Status: DISCONTINUED | OUTPATIENT
Start: 2017-11-14 | End: 2017-11-15 | Stop reason: HOSPADM

## 2017-11-14 RX ORDER — ATORVASTATIN CALCIUM 10 MG/1
10 TABLET, FILM COATED ORAL
Status: DISCONTINUED | OUTPATIENT
Start: 2017-11-14 | End: 2017-11-15 | Stop reason: HOSPADM

## 2017-11-14 RX ORDER — METOPROLOL SUCCINATE 50 MG/1
50 TABLET, EXTENDED RELEASE ORAL
Status: DISCONTINUED | OUTPATIENT
Start: 2017-11-14 | End: 2017-11-15 | Stop reason: HOSPADM

## 2017-11-14 RX ORDER — POTASSIUM CHLORIDE 14.9 MG/ML
20 INJECTION INTRAVENOUS
Status: DISCONTINUED | OUTPATIENT
Start: 2017-11-14 | End: 2017-11-14 | Stop reason: RX

## 2017-11-14 RX ORDER — ONDANSETRON 2 MG/ML
4 INJECTION INTRAMUSCULAR; INTRAVENOUS
Status: DISCONTINUED | OUTPATIENT
Start: 2017-11-14 | End: 2017-11-15 | Stop reason: HOSPADM

## 2017-11-14 RX ORDER — BACLOFEN 10 MG/1
10 TABLET ORAL
Status: DISCONTINUED | OUTPATIENT
Start: 2017-11-14 | End: 2017-11-15 | Stop reason: HOSPADM

## 2017-11-14 RX ORDER — HYDROCODONE BITARTRATE AND ACETAMINOPHEN 7.5; 325 MG/1; MG/1
1 TABLET ORAL
Status: DISCONTINUED | OUTPATIENT
Start: 2017-11-14 | End: 2017-11-15 | Stop reason: HOSPADM

## 2017-11-14 RX ORDER — NITROGLYCERIN 0.4 MG/1
0.4 TABLET SUBLINGUAL
Status: DISCONTINUED | OUTPATIENT
Start: 2017-11-14 | End: 2017-11-15 | Stop reason: HOSPADM

## 2017-11-14 RX ORDER — TRAZODONE HYDROCHLORIDE 50 MG/1
50 TABLET ORAL
Status: DISCONTINUED | OUTPATIENT
Start: 2017-11-14 | End: 2017-11-15 | Stop reason: HOSPADM

## 2017-11-14 RX ORDER — LORAZEPAM 2 MG/ML
1 INJECTION INTRAMUSCULAR ONCE
Status: ACTIVE | OUTPATIENT
Start: 2017-11-14 | End: 2017-11-14

## 2017-11-14 RX ORDER — DEXTROAMPHETAMINE SACCHARATE, AMPHETAMINE ASPARTATE, DEXTROAMPHETAMINE SULFATE AND AMPHETAMINE SULFATE 2.5; 2.5; 2.5; 2.5 MG/1; MG/1; MG/1; MG/1
30 TABLET ORAL 3 TIMES DAILY
Status: DISCONTINUED | OUTPATIENT
Start: 2017-11-14 | End: 2017-11-15 | Stop reason: HOSPADM

## 2017-11-14 RX ORDER — BUSPIRONE HYDROCHLORIDE 5 MG/1
15 TABLET ORAL
Status: DISCONTINUED | OUTPATIENT
Start: 2017-11-14 | End: 2017-11-15 | Stop reason: HOSPADM

## 2017-11-14 RX ORDER — SODIUM CHLORIDE 0.9 % (FLUSH) 0.9 %
5-10 SYRINGE (ML) INJECTION EVERY 8 HOURS
Status: DISCONTINUED | OUTPATIENT
Start: 2017-11-14 | End: 2017-11-15 | Stop reason: HOSPADM

## 2017-11-14 RX ORDER — FAMOTIDINE 20 MG/1
40 TABLET, FILM COATED ORAL
Status: DISCONTINUED | OUTPATIENT
Start: 2017-11-15 | End: 2017-11-15 | Stop reason: HOSPADM

## 2017-11-14 RX ORDER — POTASSIUM CHLORIDE 750 MG/1
10 TABLET, EXTENDED RELEASE ORAL 2 TIMES DAILY
Status: DISCONTINUED | OUTPATIENT
Start: 2017-11-15 | End: 2017-11-15 | Stop reason: HOSPADM

## 2017-11-14 RX ORDER — PANTOPRAZOLE SODIUM 40 MG/1
40 TABLET, DELAYED RELEASE ORAL
Status: DISCONTINUED | OUTPATIENT
Start: 2017-11-14 | End: 2017-11-15 | Stop reason: HOSPADM

## 2017-11-14 RX ORDER — SERTRALINE HYDROCHLORIDE 100 MG/1
200 TABLET, FILM COATED ORAL DAILY
Status: DISCONTINUED | OUTPATIENT
Start: 2017-11-14 | End: 2017-11-15 | Stop reason: HOSPADM

## 2017-11-14 RX ADMIN — SODIUM CHLORIDE: 900 INJECTION, SOLUTION INTRAVENOUS at 08:51

## 2017-11-14 RX ADMIN — METOPROLOL SUCCINATE 50 MG: 50 TABLET, EXTENDED RELEASE ORAL at 21:58

## 2017-11-14 RX ADMIN — RIFAMPIN 25 MG: 300 CAPSULE ORAL at 09:01

## 2017-11-14 RX ADMIN — PANTOPRAZOLE SODIUM 40 MG: 40 TABLET, DELAYED RELEASE ORAL at 21:57

## 2017-11-14 RX ADMIN — ATORVASTATIN CALCIUM 10 MG: 10 TABLET, FILM COATED ORAL at 21:57

## 2017-11-14 RX ADMIN — SODIUM CHLORIDE 500 ML: 900 INJECTION, SOLUTION INTRAVENOUS at 08:51

## 2017-11-14 RX ADMIN — ONDANSETRON 4 MG: 2 INJECTION INTRAMUSCULAR; INTRAVENOUS at 09:02

## 2017-11-14 RX ADMIN — RIFAMPIN 25 MG: 300 CAPSULE ORAL at 16:31

## 2017-11-14 RX ADMIN — TRAZODONE HYDROCHLORIDE 50 MG: 50 TABLET ORAL at 21:57

## 2017-11-14 RX ADMIN — Medication 5 ML: at 21:59

## 2017-11-14 RX ADMIN — SERTRALINE HYDROCHLORIDE 200 MG: 100 TABLET ORAL at 10:38

## 2017-11-14 NOTE — PROGRESS NOTES
Care Management Interventions  PCP Verified by CM: Yes  Mode of Transport at Discharge: Other (see comment)  Transition of Care Consult (CM Consult): Discharge Planning  Physical Therapy Consult: Yes  Current Support Network: Lives with Spouse  Confirm Follow Up Transport: Family  Plan discussed with Pt/Family/Caregiver: Yes  Freedom of Choice Offered: Yes  Discharge Location  Discharge Placement: Home with home health        Referral per MD.  [facility].  Chart reviewed. SW spoke with pt who is A&O. PTA pt living with spouse, indep with ADL'S, drives. Pt has significant hx of back surgeries. Pt was evaluated by PT and they recommend HH PT.  SW spoke with pt who is very agreeable to [facility].  Pt states he wanted to go to outpt PT but could not afford the 60 dollar per visit co-pay. Pt offered choices of HH and is agreeable to [facility]. Pt has all needed DME in the home. Pt's PCP is Vandana Muro. MEJIA made referral to Harrison Trinh with [facility].

## 2017-11-14 NOTE — PROGRESS NOTES
TRANSFER - IN REPORT:    Verbal report received from Frederick(name) on Karine Pittman  being received from ER(unit) for routine progression of care      Report consisted of patients Situation, Background, Assessment and   Recommendations(SBAR). Information from the following report(s) SBAR, ED Summary and Intake/Output was reviewed with the receiving nurse. Opportunity for questions and clarification was provided. Assessment completed upon patients arrival to unit and care assumed.

## 2017-11-14 NOTE — ED PROVIDER NOTES
HPI Comments: Patient states he was at home around 9 PM when he started feeling dizzy. He states that the room started spinning. His symptoms were worse when he would get up and walk around, he would start to fall down. He has had episodes of vertigo in the past and has taken meclizine but he states that this has not happened in many years. He denies any nausea or vomiting. When he sits still the symptoms are better and they are worse when he moves around. He states that at home he was unable to walk so he called EMS who brought him here for evaluation. Elements of this note were created using speech recognition software. As such, errors of speech recognition may be present. Patient is a 71 y.o. male presenting with dizziness. The history is provided by the patient. Dizziness   Pertinent negatives include no vomiting and no nausea. Past Medical History:   Diagnosis Date    Allergic rhinitis, cause unspecified 3/8/2013    CAD (coronary artery disease) 1/3/2013    MI age 62- no intervention; affirms SOB with 1 flight of steps    Cancer [facility])     Chest pain     Chronic pain     lumbar pain    Depression     Dizziness 3/8/2013    Dyslipidemia     Esophageal stenosis 8/16/2013    dxed on EGD early 2000's. Never dilated    Esophagitis 1/3/2013    GERD with esophagitis     not completely controlled with zantac    HTN (hypertension) 1/3/2013    controlled with med    Kidney stones 1/3/2013    Narcolepsy 1/3/2013    Prostate cancer ([facility] [address] 1/3/2013    Vertigo, benign positional        Past Surgical History:   Procedure Laterality Date    HX CARPAL TUNNEL RELEASE      HX CHOLECYSTECTOMY  2008?  HX LITHOTRIPSY      HX LUMBAR LAMINECTOMY      x6 Dr. Mario Casper HX ORTHOPAEDIC  2004    carpel tunnel/left hand    HX ORTHOPAEDIC  1995    carpel tunnel/right hand    [facility]  2008[facility]      [facility]         Family History:   Problem Relation Age of Onset    Depression Mother     Alcohol abuse Father     Depression Sister     Depression Brother     Depression Brother     Depression Sister     Depression Sister        Social History     Social History    Marital status:      Spouse name: N/A    Number of children: N/A    Years of education: N/A     Occupational History    Not on file. Social History Main Topics    Smoking status: Never Smoker    Smokeless tobacco: Never Used    Alcohol use No    Drug use: No    Sexual activity: Not on file     Other Topics Concern     Service Yes     reserves - 8 m 1966    Blood Transfusions No    Caffeine Concern Yes     3 pepsis a day    Special Diet No    Exercise Yes     yard work, does some in the bed for neck   [place] Yes     Social History Narrative         ALLERGIES: Review of patient's allergies indicates no known allergies. Review of Systems   Constitutional: Negative for chills and fever. Gastrointestinal: Negative for nausea and vomiting. Neurological: Positive for dizziness. All other systems reviewed and are negative. Vitals:    11/14/17 0031 11/14/17 0106   BP: 114/78    Pulse: 66    Resp: 16    Temp: 98.6 °F (37 °C)    SpO2: 94% 97%   Weight: 79.4 kg (175 lb)    Height: 5' 11\" (1.803 m)             Physical Exam   Constitutional: He is oriented to person, place, and time. He appears well-developed and well-nourished. HENT:   Head: Normocephalic and atraumatic. Eyes: Conjunctivae are normal. Pupils are equal, round, and reactive to light. Neck: Normal range of motion. Neck supple. Cardiovascular: Normal rate and regular rhythm. Pulmonary/Chest: Effort normal and breath sounds normal.   Abdominal: Soft. Bowel sounds are normal.   Musculoskeletal: He exhibits no edema or tenderness. Neurological: He is alert and oriented to person, place, and time. Skin: Skin is warm and dry. Psychiatric: He has a normal mood and affect.  His behavior is normal.   Nursing note and vitals reviewed. MDM  Number of Diagnoses or Management Options  Hypertension, unspecified type:   Vertigo: new and requires workup  Diagnosis management comments: Differential diagnosis: Benign positional vertigo, electrolyte abnormality, cerebellar stroke, vertebrobasilar artery insufficiency  3:50 AM discussed results with patient, my concern about posterior fossa pathology. Given his age and his hypertension, I feel that he needs continued observation for an MRI of his brain. Hospitalist paged  4:37 AM Spoke with Dr Elvira Crook, requests a tele-neurology consult  6:17 AM Spoke with Dr Chirag Magana, recommends getting a head CT  6:50 AM CT scan shows no acute abnormality. I spoke with Dr. Elvira Crook, discussed details of case.   He will see the patient for observation       Amount and/or Complexity of Data Reviewed  Clinical lab tests: ordered and reviewed  Tests in the radiology section of CPT®: ordered and reviewed  Tests in the medicine section of CPT®: ordered and reviewed  Discuss the patient with other providers: yes  Independent visualization of images, tracings, or specimens: yes    Risk of Complications, Morbidity, and/or Mortality  Presenting problems: moderate  Diagnostic procedures: moderate  Management options: moderate    Patient Progress  Patient progress: stable    ED Course       Procedures

## 2017-11-14 NOTE — ED NOTES
Pt remains a/o x 4 skin warm dry intact no RED. Pt resting on stretcher, educated as to the plan of care with Sharon Mak. VSS NAD IV intact no RED. Continue to monitor.

## 2017-11-14 NOTE — PROGRESS NOTES
MRI brain negative for CVA. Suspect this is vertigo vs orthostatic hypotension. Will discontinue remote telemetry and okay for him to go to a medical bed.     Felipa Mcguire MD

## 2017-11-14 NOTE — H&P
HOSPITALIST H&P  NAME:  Carlo Saucedo   Age:  71 y.o.  :   1945   MRN:   [id number]  PCP: Sidney Pedro MD  Consulting MD:  Treatment Team: Attending Provider: Rose Marie Bai MD; Primary Nurse: Vj Strong RN  HPI:   Tony Iverson is a 72 yo M with pmhx of CAD, lumbar stenosis with a total of 7 back surgeries performed, and a history of vertigo with the last significant episode 3 years ago, who presents with significant dizziness with standing and gait disturbance that started yesterday afternoon. He reports the dizziness worsened into the evening and got so severe he could not walk. Dizziness improved with lying down and worsened again with ambulation. He has severe chronic R foot drag and mild L foot drag, but he otherwise denies any other focal weakness or slurred speech. Teleneurology consulted by Dr. Hima Ledbetter (ED physician) and recommended CT head, which was negative. Further recommendations for observation on remote telemetry and to get an MRI of his brain to evaluate for stroke. He reports he has been taking an aspirin 81 mg daily but has been holding it the last several days in preparation for an epidural procedure for his back/leg pain that is scheduled for this Friday, . He reports he is still dizzy only when he stands. BP review in vitals shows marginal blood pressure, but no orthostatic bp has been checked. Complete ROS done and is as stated in HPI or otherwise negative  Past Medical History:   Diagnosis Date    Allergic rhinitis, cause unspecified 3/8/2013    CAD (coronary artery disease) 1/3/2013    MI age 62- no intervention; affirms SOB with 1 flight of steps    Cancer [facility])     Chest pain     Chronic pain     lumbar pain    Depression     Dizziness 3/8/2013    Dyslipidemia     Esophageal stenosis 2013    dxed on EGD early 's.   Never dilated    Esophagitis 1/3/2013    GERD with esophagitis     not completely controlled with zantac    HTN (hypertension) 1/3/2013    controlled with med    Kidney stones 1/3/2013    Narcolepsy 1/3/2013    Prostate cancer ([facility] [address] 1/3/2013    Vertigo, benign positional       Past Surgical History:   Procedure Laterality Date    HX CARPAL TUNNEL RELEASE      HX CHOLECYSTECTOMY  ?  HX LITHOTRIPSY      HX LUMBAR LAMINECTOMY      x6 Dr. Osman Beltran HX ORTHOPAEDIC      carpel tunnel/left hand    HX ORTHOPAEDIC      carpel tunnel/right hand    HX PROSTATECTOMY  ? Dr. Alegre Cool      Laminectomy      Prior to Admission Medications   Prescriptions Last Dose Informant Patient Reported? Taking? HYDROcodone-acetaminophen (NORCO) 7.5-325 mg per tablet   Yes Yes   Sig: TAKE 1 TABLET BY MOUTH 4 TIMES A DAY AS NEEDED FOR PAIN   HYDROcodone-acetaminophen (NORCO) 7.5-325 mg per tablet   No Yes   Si po QID prn severe pain for 2017   HYDROcodone-acetaminophen (NORCO) 7.5-325 mg per tablet   No Yes   Si po QID prn severe pain for 2017   HYDROcodone-acetaminophen (NORCO) 7.5-325 mg per tablet   No Yes   Si po QID prn severe pain for 2017   OTHER   No Yes   Sig: Knee High compression hose or socks  (20 - 30 mmHg if possible) Wear Daily Dx:   ( vericose veins, edema - R60.9)   OTHER   No Yes   Sig: Disabled placard - This is to certify that this patient has an inability to ordinarily walk 100 feet nonstop without aggravating an existing medical condition, including the increase of pain. amLODIPine (NORVASC) 10 mg tablet   No Yes   Sig: Take 1 Tab by mouth daily. aspirin delayed-release 81 mg tablet   No Yes   Sig: Take 1 Tab by mouth daily. Patient taking differently: Take 81 mg by mouth every morning.    baclofen (LIORESAL) 10 mg tablet   No Yes   Si-2 po TID prn muscle spasm - causes sleepiness   benazepril-hydroCHLOROthiazide (LOTENSIN HCT) 20-25 mg per tablet   No Yes   Si po qam for blood pressure instead of the lotensin busPIRone (BUSPAR) 15 mg tablet   No Yes   Si/2 - 1 po TID for anxiety   Patient taking differently: 7.5 mg three (3) times daily as needed (anxiety). dextroamphetamine-amphetamine (ADDERALL) 30 mg tablet   No Yes   Sig: Take 1 Tab by mouth three (3) times daily. Max Daily Amount: 3 Tabs. dextroamphetamine-amphetamine (ADDERALL) 30 mg tablet   No Yes   Sig: Earliest Fill Date: 17.  1 po TID for    dextroamphetamine-amphetamine (ADDERALL) 30 mg tablet   No Yes   Sig: Earliest Fill Date: 10/29/17.  1 po TID for    dextroamphetamine-amphetamine (ADDERALL) 30 mg tablet   No Yes   Sig: Earliest Fill Date: 17.   po TID for 2017   loratadine (CLARITIN) 10 mg tablet   No No   Sig: Take 1 Tab by mouth daily. Patient taking differently: Take 10 mg by mouth daily as needed for Allergies. lovastatin (MEVACOR) 20 mg tablet   No Yes   Sig: Take 1 Tab by mouth nightly. meclizine (ANTIVERT) 25 mg tablet   No Yes   Sig: Take 1 Tab by mouth three (3) times daily as needed. metoprolol succinate (TOPROL-XL) 50 mg XL tablet   No Yes   Sig: Take 1 Tab by mouth daily. Patient taking differently: Take 50 mg by mouth nightly. nitroglycerin (NITROSTAT) 0.4 mg SL tablet   No Yes   Si Tab by SubLINGual route every five (5) minutes as needed for Chest Pain. X3. If unrelieved call 911   omeprazole (PRILOSEC) 40 mg capsule   No Yes   Sig: Take 1 Cap by mouth daily. Patient taking differently: Take 40 mg by mouth nightly. potassium chloride SR (KLOR-CON 10) 10 mEq tablet   No Yes   Sig: Take 2 Tabs by mouth two (2) times a day. raNITIdine (ZANTAC) 300 mg tablet   No Yes   Sig: Take 1 Tab by mouth daily.    sertraline (ZOLOFT) 100 mg tablet   No Yes   Si 1/2 - 3 po every day for depression dx:F32.9   Patient taking differently: 200 mg. depression dx:F32.9   simethicone 125 mg chewable tablet   No Yes   Sig: Take 125 mg by mouth every six (6) hours as needed for Flatulence. traZODone (DESYREL) 50 mg tablet   No Yes   Si-3 po QHS for insomnia   Patient taking differently: 50 mg nightly. 1-3 po QHS prn for insomnia  Indications: insomnia associated with depression      Facility-Administered Medications: None     No Known Allergies   Social History   Substance Use Topics    Smoking status: Never Smoker    Smokeless tobacco: Never Used    Alcohol use No      Family History   Problem Relation Age of Onset    Depression Mother     Alcohol abuse Father     Depression Sister     Depression Brother     Depression Brother     Depression Sister     Depression Sister       Objective:     Visit Vitals    /82    Pulse 61    Temp 98.6 °F (37 °C)    Resp 16    Ht 5' 11\" (1.803 m)    Wt 79.4 kg (175 lb)    SpO2 98%    BMI 24.41 kg/m2      Temp (24hrs), Av.6 °F (37 °C), Min:98.6 °F (37 °C), Max:98.6 °F (37 °C)    Oxygen Therapy  O2 Sat (%): 98 % (17 0742)  Pulse via Oximetry: 57 beats per minute (17 0742)  O2 Device: Room air (17 0106)  Physical Exam:  General:    Alert, cooperative, no distress, appears stated age. Head:   Normocephalic, without obvious abnormality, atraumatic. Nose:  Nares normal. No drainage or sinus tenderness. Lungs:   Clear to auscultation bilaterally. No Wheezing or Rhonchi. No rales. Heart:   Regular rate and rhythm,  no murmur, rub or gallop. Abdomen:   Soft, non-tender. Not distended. Bowel sounds normal.   Extremities: No cyanosis. No edema. No clubbing  Skin:     Texture, turgor normal. No rashes or lesions.   Not Jaundiced  Neurologic: Alert and oriented x 3, R foot weakness and unable to dorsiflex at R ankle (chronic), mild L foot weakness (baseline), normal leg and arm strength  Data Review:   Recent Results (from the past 24 hour(s))   GLUCOSE, POC    Collection Time: 17 12:37 AM   Result Value Ref Range    Glucose (POC) 95 65 - 100 mg/dL   EKG, 12 LEAD, INITIAL    Collection Time: 11/14/17 12:40 AM   Result Value Ref Range    Ventricular Rate 65 BPM    Atrial Rate 65 BPM    P-R Interval 164 ms    QRS Duration 82 ms    Q-T Interval 454 ms    QTC Calculation (Bezet) 472 ms    Calculated P Axis 43 degrees    Calculated R Axis 14 degrees    Calculated T Axis 43 degrees    Diagnosis       Normal sinus rhythm  Nonspecific T wave abnormality  Prolonged QT  Abnormal ECG  When compared with ECG of 26-MAY-2017 03:26,  Nonspecific T wave abnormality, improved in Lateral leads  QT has lengthened  Confirmed by ROXANE MUNOZ (), ROMINA MOYA ([id number]) on 11/14/2017 7:40:12 AM     CBC W/O DIFF    Collection Time: 11/14/17 12:42 AM   Result Value Ref Range    WBC 5.8 4.3 - 11.1 K/uL    RBC 3.99 (L) 4.23 - 5.67 M/uL    HGB 11.8 (L) 13.6 - 17.2 g/dL    HCT 35.1 (L) 41.1 - 50.3 %    MCV 88.0 79.6 - 97.8 FL    MCH 29.6 26.1 - 32.9 PG    MCHC 33.6 31.4 - 35.0 g/dL    RDW 14.5 11.9 - 14.6 %    PLATELET 136 523 - 300 K/uL    MPV 9.9 (L) 10.8 - 94.3 FL   METABOLIC PANEL, COMPREHENSIVE    Collection Time: 11/14/17 12:42 AM   Result Value Ref Range    Sodium 142 136 - 145 mmol/L    Potassium 2.4 (LL) 3.5 - 5.1 mmol/L    Chloride 104 98 - 107 mmol/L    CO2 26 21 - 32 mmol/L    Anion gap 12 7 - 16 mmol/L    Glucose 102 (H) 65 - 100 mg/dL    BUN 16 8 - 23 MG/DL    Creatinine 1.24 0.8 - 1.5 MG/DL    GFR est AA >60 >60 ml/min/1.73m2    GFR est non-AA >60 >60 ml/min/1.73m2    Calcium 9.2 8.3 - 10.4 MG/DL    Bilirubin, total 0.6 0.2 - 1.1 MG/DL    ALT (SGPT) 19 12 - 65 U/L    AST (SGOT) 20 15 - 37 U/L    Alk. phosphatase 75 50 - 136 U/L    Protein, total 6.4 6.3 - 8.2 g/dL    Albumin 3.7 3.2 - 4.6 g/dL    Globulin 2.7 2.3 - 3.5 g/dL    A-G Ratio 1.4 1.2 - 3.5     POC TROPONIN-I    Collection Time: 11/14/17 12:43 AM   Result Value Ref Range    Troponin-I (POC) 0 (L) 0.02 - 0.05 ng/ml     Imaging /Procedures /Studies   CT HEAD WO CONT   Final Result   IMPRESSION:      No acute intracranial abnormalities.       Date of Dictation: 11/14/2017 6:40 AM   .      MRI BRAIN WO CONT    (Results Pending)       Assessment and Plan: Active Hospital Problems    Diagnosis Date Noted    Vertigo 11/14/2017       PLAN  ·  Observation to remote telemetry. Suspect dizziness with standing is more likely a blood pressure mediated event and not CVA. · Check orthostatic BP now and every shift. · Bolus 500 cc normal saline. · Hold benazepril/hctz. Restart amlodipine tomorrow and toprol tonight with paramaters to hold for systolic BP <294. · Check MRI brain. · Given low likelihood of stroke and as he has epidural procedure set for this Friday, will hold aspirin for now. If MRI brain is +, start aspirin. · Get PT consult. Code Status: Full    Anticipated discharge: Tomorrow    Signed By: Leopold Custard Zelphia Branch, MD     November 14, 2017

## 2017-11-14 NOTE — ED NOTES
NIH stroke scale completed for [facility] baseline. Pt scored 0 NIh. VSS NAD IV intact no RED. PT remains a/o x 4 skin warm dry intact. Monitor applied. Pt resting on stetcher rails up x 2 continue to monitor.

## 2017-11-14 NOTE — Clinical Note
Patient Class[de-identified] Observation [025] Type of Bed: Medical [8] Reason for Observation: vertigo Admitting Diagnosis: Vertigo [795610] Admitting Physician: Brad Dukes [[id number]] Attending Physician: Brad Dukes [[id number]]

## 2017-11-14 NOTE — ED TRIAGE NOTES
Patient arrives with EMS from home, with CC of dizziness X 4 hours. Patient has a history of Vertigo, but states no issues of this in a few years. EMS initiated an 18 gauge IV and administered 10cc NS flush.

## 2017-11-14 NOTE — PROGRESS NOTES
Assessment complete via flow sheet. Pt A&Ox3. Respirations even and unlabored. S1 S2 auscultated. Pt on room. Pt reports pain level as tolerable, states chronic back pain. Bowel sounds active, abdomen soft. Skin intact, pt has small cut on left hand with band aid. Denies other needs. Bed in lowest position, side rails up 3, call bell in reach. Instructed to call for assistance. Pt verbalized understanding. Plan of care reviewed with patient.

## 2017-11-14 NOTE — ED NOTES
TRANSFER - OUT REPORT:    Verbal report given to Nathen Chandra [address] on Wanda Kiran  being transferred to [facility] [phone] for routine progression of care       Report consisted of patients Situation, Background, Assessment and   Recommendations(SBAR). Information from the following report(s) Kardex and ED Summary was reviewed with the receiving nurse. Lines:   Peripheral IV 11/14/17 Left Antecubital (Active)        Opportunity for questions and clarification was provided.       Patient transported with:   Transport

## 2017-11-14 NOTE — IP AVS SNAPSHOT
Marshall Cho 
 
 
 [address] [facility] 
[phone] Patient: Ward Tatum [facility] MRN: [id number] XEX:[id number] About your hospitalization You were admitted on:  November 14, 2017 You last received care in the:  [facility] You were discharged on:  November 15, 2017 Why you were hospitalized Your primary diagnosis was:  Vertigo Things You Need To Do (next 8 weeks) Follow up with Audra Montalvo MD in 1 week(s) APPT. Jean Carlos Davidnicole. 20th @ 10:45 Phone:  [phone] Where:  [facility] [address] Monday Nov 20, 2017 Extended Office Visit with Audra Montalvo MD at 11:00 AM  
Where:  [address] [place] [address] ([address] [place] [address]) Discharge Orders None A check dipesh indicates which time of day the medication should be taken. My Medications STOP taking these medications   
 amLODIPine 10 mg tablet Commonly known as:  NORVASC  
   
  
 benazepril-hydroCHLOROthiazide 20-25 mg per tablet Commonly known as:  LOTENSIN HCT  
   
  
  
TAKE these medications as instructed Instructions Each Dose to Equal  
 Morning Noon Evening Bedtime  
 aspirin delayed-release 81 mg tablet Your last dose was: Your next dose is: Take 1 Tab by mouth daily. 81 mg  
    
   
   
   
  
 baclofen 10 mg tablet Commonly known as:  LIORESAL Your last dose was: Your next dose is:    
   
   
 1-2 po TID prn muscle spasm - causes sleepiness  
     
   
   
   
  
 busPIRone 15 mg tablet Commonly known as:  BUSPAR Your last dose was: Your next dose is:    
   
   
 1/2 - 1 po TID for anxiety  
     
   
   
   
  
 dextroamphetamine-amphetamine 30 mg tablet Commonly known as:  ADDERALL Your last dose was: Your next dose is: Take 1 Tab by mouth three (3) times daily. Max Daily Amount: 3 Tabs. 30 mg HYDROcodone-acetaminophen 7.5-325 mg per tablet Commonly known as:  Felicita Miller Your last dose was: Your next dose is: TAKE 1 TABLET BY MOUTH 4 TIMES A DAY AS NEEDED FOR PAIN  
     
   
   
   
  
 loratadine 10 mg tablet Commonly known as:  Cristina Nay Your last dose was: Your next dose is: Take 1 Tab by mouth daily. 10 mg  
    
   
   
   
  
 lovastatin 20 mg tablet Commonly known as:  MEVACOR Your last dose was: Your next dose is: Take 1 Tab by mouth nightly. 20 mg  
    
   
   
   
  
 meclizine 25 mg tablet Commonly known as:  ANTIVERT Your last dose was: Your next dose is: Take 1 Tab by mouth three (3) times daily as needed. 25 mg  
    
   
   
   
  
 metoprolol succinate 50 mg XL tablet Commonly known as:  TOPROL-XL Your last dose was: Your next dose is: Take 1 Tab by mouth daily. 50 mg  
    
   
   
   
  
 nitroglycerin 0.4 mg SL tablet Commonly known as:  NITROSTAT Your last dose was: Your next dose is:    
   
   
 1 Tab by SubLINGual route every five (5) minutes as needed for Chest Pain. X3. If unrelieved call 911  
 0.4 mg  
    
   
   
   
  
 omeprazole 40 mg capsule Commonly known as:  PRILOSEC Your last dose was: Your next dose is: Take 1 Cap by mouth daily. 40 mg  
    
   
   
   
  
 * OTHER Your last dose was: Your next dose is:    
   
   
 Knee High compression hose or socks  (20 - 30 mmHg if possible) Wear Daily Dx:   ( vericose veins, edema - R60.9) * OTHER Your last dose was:     
   
Your next dose is:    
   
   
 Disabled placard - This is to certify that this patient has an inability to ordinarily walk 100 feet nonstop without aggravating an existing medical condition, including the increase of pain. potassium chloride SR 10 mEq tablet Commonly known as:  KLOR-CON 10 Your last dose was: Your next dose is: Take 2 Tabs by mouth daily. 20 mEq  
    
   
   
   
  
 raNITIdine 300 mg tablet Commonly known as:  ZANTAC Your last dose was: Your next dose is: Take 1 Tab by mouth daily. 300 mg  
    
   
   
   
  
 sertraline 100 mg tablet Commonly known as:  ZOLOFT Your last dose was: Your next dose is: Take 2 Tabs by mouth daily. depression dx:F32.9  
 200 mg  
    
   
   
   
  
 simethicone 125 mg chewable tablet Your last dose was: Your next dose is: Take 125 mg by mouth every six (6) hours as needed for Flatulence. 125 mg  
    
   
   
   
  
 traZODone 50 mg tablet Commonly known as:  Newdale Dessert Your last dose was: Your next dose is:    
   
   
 1-3 po QHS for insomnia * Notice: This list has 2 medication(s) that are the same as other medications prescribed for you. Read the directions carefully, and ask your doctor or other care provider to review them with you. Where to Get Your Medications Information on where to get these meds will be given to you by the nurse or doctor. ! Ask your nurse or doctor about these medications  
  potassium chloride SR 10 mEq tablet  
 sertraline 100 mg tablet Discharge Instructions DISCHARGE SUMMARY from Nurse The following personal items are in your possession at time of discharge: 
 
  
  
  
  
  
Clothing: None PATIENT INSTRUCTIONS: 
 
After general anesthesia or intravenous sedation, for 24 hours or while taking prescription Narcotics: · Limit your activities · Do not drive and operate hazardous machinery · Do not make important personal or business decisions · Do  not drink alcoholic beverages · If you have not urinated within 8 hours after discharge, please contact your surgeon on call. Report the following to your surgeon: 
· Excessive pain, swelling, redness or odor of or around the surgical area · Temperature over 100.5 · Nausea and vomiting lasting longer than 4 hours or if unable to take medications · Any signs of decreased circulation or nerve impairment to extremity: change in color, persistent  numbness, tingling, coldness or increase pain · Any questions What to do at Home: 
Recommended activity: Activity as tolerated, per MD 
 
If you experience any of the following symptoms fever>101, pain unrelieved with medication, nausea/vomiting, shortness of breath, dizziness/fainting, chest pain. , please follow up with your doctor. *  Please give a list of your current medications to your Primary Care Provider. *  Please update this list whenever your medications are discontinued, doses are 
    changed, or new medications (including over-the-counter products) are added. *  Please carry medication information at all times in case of emergency situations. These are general instructions for a healthy lifestyle: No smoking/ No tobacco products/ Avoid exposure to second hand smoke Surgeon General's Warning:  Quitting smoking now greatly reduces serious risk to your health. Obesity, smoking, and sedentary lifestyle greatly increases your risk for illness A healthy diet, regular physical exercise & weight monitoring are important for maintaining a healthy lifestyle You may be retaining fluid if you have a history of heart failure or if you experience any of the following symptoms:  Weight gain of 3 pounds or more overnight or 5 pounds in a week, increased swelling in our hands or feet or shortness of breath while lying flat in bed.   Please call your doctor as soon as you notice any of these symptoms; do not wait until your next office visit. Recognize signs and symptoms of STROKE: 
 
F-face looks uneven A-arms unable to move or move unevenly S-speech slurred or non-existent T-time-call 911 as soon as signs and symptoms begin-DO NOT go Back to bed or wait to see if you get better-TIME IS BRAIN. Warning Signs of HEART ATTACK Call 911 if you have these symptoms: 
? Chest discomfort. Most heart attacks involve discomfort in the center of the chest that lasts more than a few minutes, or that goes away and comes back. It can feel like uncomfortable pressure, squeezing, fullness, or pain. ? Discomfort in other areas of the upper body. Symptoms can include pain or discomfort in one or both arms, the back, neck, jaw, or stomach. ? Shortness of breath with or without chest discomfort. ? Other signs may include breaking out in a cold sweat, nausea, or lightheadedness. Don't wait more than five minutes to call [address]! Fast action can save your life. Calling 911 is almost always the fastest way to get lifesaving treatment. Emergency Medical Services staff can begin treatment when they arrive  up to an hour sooner than if someone gets to the hospital by car. The discharge information has been reviewed with the patient. The patient verbalized understanding. Discharge medications reviewed with the patient and appropriate educational materials and side effects teaching were provided. Monitor and record your blood pressure every morning and evening and take readings to your follow up appointment with Dr. Gary Robles. Vertigo: Exercises Your Care Instructions Here are some examples of typical rehabilitation exercises for your condition. Start each exercise slowly. Ease off the exercise if you start to have pain.  
Your doctor or physical therapist will tell you when you can start these exercises and which ones will work best for you. How to do the exercises Exercise 1 
 
6. Stand with a chair in front of you and a wall behind you. If you begin to fall, you may use them for support. 7. Stand with your feet together and your arms at your sides. 8. Move your head up and down 10 times. Exercise 2 
 
6. Move your head side to side 10 times. Exercise 3 1. Move your head diagonally up and down 10 times. Exercise 4 1. Move your head diagonally up and down 10 times on the other side. Follow-up care is a key part of your treatment and safety. Be sure to make and go to all appointments, and call your doctor if you are having problems. It's also a good idea to know your test results and keep a list of the medicines you take. Where can you learn more? Go to http://oracio-gage.info/. Enter F349 in the search box to learn more about \"Vertigo: Exercises. \" Current as of: May 4, 2017 Content Version: 11.4 © 2141-0462 [employer]. Care instructions adapted under license by [employer] (which disclaims liability or warranty for this information). If you have questions about a medical condition or this instruction, always ask your healthcare professional. [address] any warranty or liability for your use of this information. [employer] Announcement We are excited to announce that we are making your provider's discharge notes available to you in [employer]. You will see these notes when they are completed and signed by the physician that discharged you from your recent hospital stay. If you have any questions or concerns about any information you see in My[employer], please call the Health Information Department where you were seen or reach out to your Primary Care Provider for more information about your plan of care. Introducing [facility]! Dear Vadim Shah: Thank you for requesting a [employer] account. Our records indicate that you already have an active [employer] account. You can access your account anytime at [email or web address] Did you know that you can access your hospital and ER discharge instructions at any time in [employer]? You can also review all of your test results from your hospital stay or ER visit. Additional Information If you have questions, please visit the Frequently Asked Questions section of the [employer] website at [email or web address] Remember, [employer] is NOT to be used for urgent needs. For medical emergencies, dial 911. Now available from your iPhone and Android! Unresulted Labs-Please follow up with your PCP about these lab tests Order Current Status METABOLIC PANEL, BASIC In process Providers Seen During Your Hospitalization Provider Specialty Primary office phone Pascual Britt MD Emergency Medicine [phone] [facility], [address] [place] [address] Internal Medicine [phone] Immunizations Administered for This Admission Name Date Influenza Vaccine (Quad) PF 11/15/2017 Your Primary Care Physician (PCP) Primary Care Physician Office Phone Office Fax Cosmo Leyv [phone] You are allergic to the following No active allergies Recent Documentation Height Weight BMI Smoking Status 1.803 m 79.4 kg 24.41 kg/m2 Never Smoker Emergency Contacts Name Discharge Info Relation Home Work Mobile Rowena Shorten  Spouse [3] [phone] JuddjazzmineLucian  Child [2] [phone] Giorgi Scott  Son [22] [phone] Patient Belongings The following personal items are in your possession at time of discharge: 
                   Clothing: None Please provide this summary of care documentation to your next provider. Signatures-by signing, you are acknowledging that this After Visit Summary has been reviewed with you and you have received a copy. Patient Signature:  ____________________________________________________________ Date:  ____________________________________________________________  
  
Janyth Mins Provider Signature:  ____________________________________________________________ Date:  ____________________________________________________________

## 2017-11-14 NOTE — PROGRESS NOTES
PT      PT has checked on pt. Twice: in MRI then just back from restroom and laid down. Will try to check back in later or initiate eval tomorrow.     Derrell Knight, [address]

## 2017-11-14 NOTE — PROGRESS NOTES
[facility]  Face to Face Encounter    Patients Name: Diana Robledo    YOB: 1945    Ordering Physician:   Debbie Posey     Primary Diagnosis: Vertigo  Vertigo    Date of Face to Face:   11/14/2017                                  Face to Face Encounter findings are related to primary reason for home care:   yes. 1. I certify that the patient needs intermittent care as follows: physical therapy: strengthening and gait/stair training    2. I certify that this patient is homebound, that is: 1) patient requires the use of a walker device, special transportation, or assistance of another to leave the home; or 2) patient's condition makes leaving the home medically contraindicated; and 3) patient has a normal inability to leave the home and leaving the home requires considerable and taxing effort. Patient may leave the home for infrequent and short duration for medical reasons, and occasional absences for non-medical reasons. Homebound status is due to the following functional limitations: Patient with strength deficits limiting the performance of all ADL's without caregiver assistance or the use of an assistive device. 3. I certify that this patient is under my care and that I, or a nurse practitioner or  [id number], or clinical nurse specialist, or certified nurse midwife, working with me, had a Face-to-Face Encounter that meets the physician Face-to-Face Encounter requirements. The following are the clinical findings from the [address] [place] [address] encounter that support the need for skilled services and is a summary of the encounter:   See Progress Notes     See attached progess note      [address] 72, LBSW  11/14/2017      THE FOLLOWING TO BE COMPLETED BY THE COMMUNITY PHYSICIAN:    I concur with the findings described above from the [facility] encounter that this patient is homebound and in need of a skilled service.     Certifying Physician: _____________________________________ Printed Certifying Physician Name: _____________________________________    Date: _________________

## 2017-11-14 NOTE — PROGRESS NOTES
when he gets home, if he is INFIMET. I have spoken with Sheryl Jones, and recommend [facility] PT initially. This section established at most recent assessment   PROBLEM LIST (Impairments causing functional limitations):  1. Decreased Strength  2. Decreased ADL/Functional Activities  3. Decreased Transfer Abilities  4. Decreased Ambulation Ability/Technique  5. Decreased Balance  6. Increased Pain  7. Decreased Activity Tolerance  8. Increased Fatigue  9. Decreased Flexibility/Joint Mobility  10. Decreased Knowledge of Precautions  11. Decreased Skin Integrity/Hygeine   INTERVENTIONS PLANNED: (Benefits and precautions of physical therapy have been discussed with the patient.)  1. Balance Exercise  2. Bed Mobility  3. Family Education  4. Gait Training  5. Range of Motion (ROM)  6. Therapeutic Activites  7. Therapeutic Exercise/Strengthening  8. Transfer Training     TREATMENT PLAN: Frequency/Duration: daily for duration of hospital stay  Rehabilitation Potential For Stated Goals: Wilfredo Dubois REHABILITATION/EQUIPMENT: (at time of discharge pending progress): Due to the probability of continued deficits (see above) this patient will likely need continued skilled physical therapy after discharge. Equipment:    has a straight cane, R AFO, RW, BSC and TTB              HISTORY:   History of Present Injury/Illness (Reason for Referral): Admitted with vertigo. It is better this afternoon from last night  Past Medical History/Comorbidities:   Mr. Little Holstein  has a past medical history of Allergic rhinitis, cause unspecified (3/8/2013); CAD (coronary artery disease) (1/3/2013); Cancer ([facility] [address] Chest pain; Chronic pain; Depression; Dizziness (3/8/2013); Dyslipidemia; Esophageal stenosis (8/16/2013); Esophagitis (1/3/2013); GERD with esophagitis; HTN (hypertension) (1/3/2013); Kidney stones (1/3/2013); Narcolepsy (1/3/2013);  Prostate cancer ([address] (1/3/2013); and Vertigo, benign positional.  Mr. Little Holstein  has a past surgical history that includes carpal tunnel release; cholecystectomy (2008?); lithotripsy; prostatectomy (2008?); orthopaedic (2004); orthopaedic (1995); neurological procedure unlisted; and lumbar laminectomy. Social History/Living Environment:   Home Environment: Trailer/mobile home  # Steps to Enter: 9  Hand Rails : Bilateral  One/Two Story Residence: One story  Living Alone: No  Support Systems: Spouse/Significant Other/Partner (she recently broke a hip)  Patient Expects to be Discharged to[de-identified] Trailer/mobile home  Current DME Used/Available at Home: Walker, rolling, Cane, straight, Tub transfer bench, Commode, bedside (R AFO)  Tub or Shower Type: Tub/Shower combination  Prior Level of Function/Work/Activity:  Has back pain, and uses a R AFO with a cane to walk. Still drives. Completes ADls on his own  Dominant Side:         RIGHT   Number of Personal Factors/Comorbidities that affect the Plan of Care: 1-2: MODERATE COMPLEXITY   EXAMINATION:   Most Recent Physical Functioning:   Gross Assessment:  AROM: Generally decreased, functional (all 4s)  Strength: Generally decreased, functional (B UEs and L LE)  Tone: Abnormal (R Ankle)  Sensation: Impaired (B LEs)            RLE Strength  R Hip Flexion: 3+  R Hip ABduction: 3+  R Hip ADduction: 3+  R Knee Flexion: 3+  R Knee Extension: 3+  R Ankle Dorsiflexion: 0  R Ankle Plantar Flexion: 0  Posture:     Balance:  Sitting: Intact  Standing: With support (high guard) Bed Mobility:  Supine to Sit: Supervision  Sit to Supine: Supervision  Scooting: Supervision  Wheelchair Mobility:     Transfers:  Sit to Stand: Supervision  Stand to Sit: Supervision  Gait:     Speed/Ligia: Pace decreased (<100 feet/min)  Gait Abnormalities: Steppage gait; Path deviations  Distance (ft): 40 Feet (ft)  Assistive Device: Orthotic device (held on to IV pole, R AFO)  Ambulation - Level of Assistance: Supervision  Interventions: Safety awareness training;Verbal cues      Body Structures Involved:  1. Muscles Body Functions Affected:  1. Neuromusculoskeletal  2. Movement Related Activities and Participation Affected:  1. Mobility  2. Self Care   Number of elements that affect the Plan of Care: 4+: HIGH COMPLEXITY   CLINICAL PRESENTATION:   Presentation: Evolving clinical presentation with changing clinical characteristics: MODERATE COMPLEXITY   CLINICAL DECISION MAKIN [facility] Mobility Inpatient Short Form  How much difficulty does the patient currently have. .. Unable A Lot A Little None   1. Turning over in bed (including adjusting bedclothes, sheets and blankets)? [] 1   [] 2   [] 3   [x] 4   2. Sitting down on and standing up from a chair with arms ( e.g., wheelchair, bedside commode, etc.)   [] 1   [] 2   [x] 3   [] 4   3. Moving from lying on back to sitting on the side of the bed? [] 1   [] 2   [x] 3   [] 4   How much help from another person does the patient currently need. .. Total A Lot A Little None   4. Moving to and from a bed to a chair (including a wheelchair)? [] 1   [] 2   [x] 3   [] 4   5. Need to walk in hospital room? [] 1   [] 2   [x] 3   [] 4   6. Climbing 3-5 steps with a railing? [] 1   [x] 2   [] 3   [] 4   © , Trustees of [address] [place] [address] [facility], under license to [employer]. All rights reserved      Score:  Initial:18 Most Recent: X (Date: -- )    Interpretation of Tool:  Represents activities that are increasingly more difficult (i.e. Bed mobility, Transfers, Gait). Score 24 23 22-20 19-15 14-10 9-7 6     Modifier CH CI CJ CK CL CM CN      ?  Mobility - Walking and Moving Around:     - CURRENT STATUS: CK - 40%-59% impaired, limited or restricted    - GOAL STATUS: CJ - 20%-39% impaired, limited or restricted    - D/C STATUS:  ---------------To be determined---------------  [place] / [place] / Product Type: Managed Care Medicare /      Medical Necessity:     · Patient demonstrates fair rehab potential due to higher previous functional level. Reason for Services/Other Comments:  · Patient continues to require present interventions due to patient's inability to peform functional mobility independently. Use of outcome tool(s) and clinical judgement create a POC that gives a: Clear prediction of patient's progress: LOW COMPLEXITY            TREATMENT:   (In addition to Assessment/Re-Assessment sessions the following treatments were rendered)   Pre-treatment Symptoms/Complaints:  Vertigo is imporved  Pain: Initial:   Pain Intensity 1: 0  Post Session:  0     Assessment/Reassessment only, no treatment provided today    Braces/Orthotics/Lines/Etc:   · IV  · O2 Device: Room air  Treatment/Session Assessment:    · Response to Treatment:  Tolerated well  · Interdisciplinary Collaboration:   o Physical Therapist  o Registered Nurse  o   · After treatment position/precautions:   o Supine in bed  o Bed/Chair-wheels locked  o Bed in low position  o Call light within reach  o RN notified  o Side rails x 3   · Compliance with Program/Exercises: Will assess as treatment progresses. · Recommendations/Intent for next treatment session: \"Next visit will focus on advancements to more challenging activities and reduction in assistance provided\".   Total Treatment Duration:  PT Patient Time In/Time Out  Time In: 1430  Time Out: [address], PT

## 2017-11-15 VITALS
OXYGEN SATURATION: 97 % | SYSTOLIC BLOOD PRESSURE: 111 MMHG | WEIGHT: 175 LBS | TEMPERATURE: 96.3 F | DIASTOLIC BLOOD PRESSURE: 61 MMHG | HEIGHT: 71 IN | BODY MASS INDEX: 24.5 KG/M2 | RESPIRATION RATE: 18 BRPM | HEART RATE: 60 BPM

## 2017-11-15 LAB
ANION GAP SERPL CALC-SCNC: 5 MMOL/L (ref 7–16)
BUN SERPL-MCNC: 19 MG/DL (ref 8–23)
CALCIUM SERPL-MCNC: 8.9 MG/DL (ref 8.3–10.4)
CHLORIDE SERPL-SCNC: 106 MMOL/L (ref 98–107)
CO2 SERPL-SCNC: 31 MMOL/L (ref 21–32)
CREAT SERPL-MCNC: 1.24 MG/DL (ref 0.8–1.5)
GLUCOSE SERPL-MCNC: 84 MG/DL (ref 65–100)
POTASSIUM SERPL-SCNC: 3.5 MMOL/L (ref 3.5–5.1)
SODIUM SERPL-SCNC: 142 MMOL/L (ref 136–145)

## 2017-11-15 PROCEDURE — 74011250636 HC RX REV CODE- 250/636: Performed by: INTERNAL MEDICINE

## 2017-11-15 PROCEDURE — 90471 IMMUNIZATION ADMIN: CPT

## 2017-11-15 PROCEDURE — 90686 IIV4 VACC NO PRSV 0.5 ML IM: CPT | Performed by: INTERNAL MEDICINE

## 2017-11-15 PROCEDURE — 99218 HC RM OBSERVATION: CPT

## 2017-11-15 PROCEDURE — 74011250637 HC RX REV CODE- 250/637: Performed by: INTERNAL MEDICINE

## 2017-11-15 PROCEDURE — 36415 COLL VENOUS BLD VENIPUNCTURE: CPT | Performed by: INTERNAL MEDICINE

## 2017-11-15 PROCEDURE — 80048 BASIC METABOLIC PNL TOTAL CA: CPT | Performed by: INTERNAL MEDICINE

## 2017-11-15 RX ORDER — POTASSIUM CHLORIDE 750 MG/1
20 TABLET, FILM COATED, EXTENDED RELEASE ORAL DAILY
Qty: 120 TAB | Refills: 5 | Status: SHIPPED
Start: 2017-11-15 | End: 2018-04-15

## 2017-11-15 RX ORDER — SERTRALINE HYDROCHLORIDE 100 MG/1
200 TABLET, FILM COATED ORAL DAILY
Qty: 180 TAB | Refills: 0 | Status: SHIPPED
Start: 2017-11-15 | End: 2018-04-13 | Stop reason: SDUPTHER

## 2017-11-15 RX ADMIN — AMLODIPINE BESYLATE 10 MG: 10 TABLET ORAL at 08:00

## 2017-11-15 RX ADMIN — DEXTROAMPHETAMINE SACCHARATE, AMPHETAMINE ASPARTATE MONOHYDRATE, DEXTROAMPHETAMINE SULFATE AND AMPHETAMINE SULFATE 30 MG: 2.5; 2.5; 2.5; 2.5 TABLET ORAL at 08:02

## 2017-11-15 RX ADMIN — RIFAMPIN 25 MG: 300 CAPSULE ORAL at 09:07

## 2017-11-15 RX ADMIN — POTASSIUM CHLORIDE 10 MEQ: 10 TABLET, EXTENDED RELEASE ORAL at 07:59

## 2017-11-15 RX ADMIN — FAMOTIDINE 40 MG: 20 TABLET, FILM COATED ORAL at 08:00

## 2017-11-15 RX ADMIN — INFLUENZA VIRUS VACCINE 0.5 ML: 15; 15; 15; 15 SUSPENSION INTRAMUSCULAR at 07:58

## 2017-11-15 RX ADMIN — SERTRALINE HYDROCHLORIDE 200 MG: 100 TABLET ORAL at 07:59

## 2017-11-15 NOTE — DISCHARGE INSTRUCTIONS
DISCHARGE SUMMARY from Nurse    The following personal items are in your possession at time of discharge:                   Clothing: None                PATIENT INSTRUCTIONS:    After general anesthesia or intravenous sedation, for 24 hours or while taking prescription Narcotics:  · Limit your activities  · Do not drive and operate hazardous machinery  · Do not make important personal or business decisions  · Do  not drink alcoholic beverages  · If you have not urinated within 8 hours after discharge, please contact your surgeon on call. Report the following to your surgeon:  · Excessive pain, swelling, redness or odor of or around the surgical area  · Temperature over 100.5  · Nausea and vomiting lasting longer than 4 hours or if unable to take medications  · Any signs of decreased circulation or nerve impairment to extremity: change in color, persistent  numbness, tingling, coldness or increase pain  · Any questions        What to do at Home:  Recommended activity: Activity as tolerated, per MD    If you experience any of the following symptoms fever>101, pain unrelieved with medication, nausea/vomiting, shortness of breath, dizziness/fainting, chest pain. , please follow up with your doctor. *  Please give a list of your current medications to your Primary Care Provider. *  Please update this list whenever your medications are discontinued, doses are      changed, or new medications (including over-the-counter products) are added. *  Please carry medication information at all times in case of emergency situations. These are general instructions for a healthy lifestyle:    No smoking/ No tobacco products/ Avoid exposure to second hand smoke    Surgeon General's Warning:  Quitting smoking now greatly reduces serious risk to your health.     Obesity, smoking, and sedentary lifestyle greatly increases your risk for illness    A healthy diet, regular physical exercise & weight monitoring are important for maintaining a healthy lifestyle    You may be retaining fluid if you have a history of heart failure or if you experience any of the following symptoms:  Weight gain of 3 pounds or more overnight or 5 pounds in a week, increased swelling in our hands or feet or shortness of breath while lying flat in bed. Please call your doctor as soon as you notice any of these symptoms; do not wait until your next office visit. Recognize signs and symptoms of STROKE:    F-face looks uneven    A-arms unable to move or move unevenly    S-speech slurred or non-existent    T-time-call 911 as soon as signs and symptoms begin-DO NOT go       Back to bed or wait to see if you get better-TIME IS BRAIN. Warning Signs of HEART ATTACK     Call 911 if you have these symptoms:   Chest discomfort. Most heart attacks involve discomfort in the center of the chest that lasts more than a few minutes, or that goes away and comes back. It can feel like uncomfortable pressure, squeezing, fullness, or pain.  Discomfort in other areas of the upper body. Symptoms can include pain or discomfort in one or both arms, the back, neck, jaw, or stomach.  Shortness of breath with or without chest discomfort.  Other signs may include breaking out in a cold sweat, nausea, or lightheadedness. Don't wait more than five minutes to call 911 - MINUTES MATTER! Fast action can save your life. Calling 911 is almost always the fastest way to get lifesaving treatment. Emergency Medical Services staff can begin treatment when they arrive -- up to an hour sooner than if someone gets to the hospital by car. The discharge information has been reviewed with the patient. The patient verbalized understanding. Discharge medications reviewed with the patient and appropriate educational materials and side effects teaching were provided.                 Monitor and record your blood pressure every morning and evening and take readings to your follow up appointment with Dr. Sean Botello. Vertigo: Exercises  Your Care Instructions  Here are some examples of typical rehabilitation exercises for your condition. Start each exercise slowly. Ease off the exercise if you start to have pain. Your doctor or physical therapist will tell you when you can start these exercises and which ones will work best for you. How to do the exercises  Exercise 1    6. Stand with a chair in front of you and a wall behind you. If you begin to fall, you may use them for support. 7. Stand with your feet together and your arms at your sides. 8. Move your head up and down 10 times. Exercise 2    6. Move your head side to side 10 times. Exercise 3    1. Move your head diagonally up and down 10 times. Exercise 4    1. Move your head diagonally up and down 10 times on the other side. Follow-up care is a key part of your treatment and safety. Be sure to make and go to all appointments, and call your doctor if you are having problems. It's also a good idea to know your test results and keep a list of the medicines you take. Where can you learn more? Go to http://oracio-gage.info/. Enter F349 in the search box to learn more about \"Vertigo: Exercises. \"  Current as of: May 4, 2017  Content Version: 11.4  © 6434-9715 [employer], [employer]. Care instructions adapted under license by [employer] (which disclaims liability or warranty for this information). If you have questions about a medical condition or this instruction, always ask your healthcare professional. [place] [address] any warranty or liability for your use of this information.

## 2017-11-15 NOTE — DISCHARGE SUMMARY
Hospitalist Discharge Summary     Patient ID:  Shaggy Guerrero  [id number]  71 y.o.  1945  Admit date: 11/14/2017 12:27 AM  Discharge date and time: 11/15/2017  Attending: Jana Lee. Jessa Abad MD  PCP:  Michelle Benjamin MD  Treatment Team: Attending Provider: Jana Lee. Jessa Abad MD    Principal Diagnosis Vertigo   Principal Problem:    Vertigo (11/14/2017)           HPI:  'Madie Murdock is a 72 yo M with pmhx of CAD, lumbar stenosis with a total of 7 back surgeries performed, and a history of vertigo with the last significant episode 3 years ago, who presents with significant dizziness with standing and gait disturbance that started yesterday afternoon. He reports the dizziness worsened into the evening and got so severe he could not walk. Dizziness improved with lying down and worsened again with ambulation. He has severe chronic R foot drag and mild L foot drag, but he otherwise denies any other focal weakness or slurred speech. Teleneurology consulted by Dr. Landon Rajput (ED physician) and recommended CT head, which was negative. Further recommendations for observation on remote telemetry and to get an MRI of his brain to evaluate for stroke. He reports he has been taking an aspirin 81 mg daily but has been holding it the last several days in preparation for an epidural procedure for his back/leg pain that is scheduled for this Friday, 11/17. He reports he is still dizzy only when he stands. BP review in vitals shows marginal blood pressure, but no orthostatic bp has been checked.'      Hospital Course:  He was placed in observation and MRI brain obtained, which showed no evidenced for acute CVA. He worked with PT and was recommended to have home PT and to use a walker to assist him when he has vertigo. His dizziness improved with prn meclizine and exercises provided to him for the vertigo. His blood pressure was noted to be low-normal with systolic ranging from 07U-485Z.   This was with the benazepril/hctz and amlodipine held. These will continue to be held on discharge and he is to continue just the Toprol until follow up with Minesh Clinton PCP. He is encouraged to monitor and record his blood pressure in the morning and evening and take the readings to his follow up appointment. Significant Diagnostic Studies:       Labs: Results:       Chemistry Recent Labs      11/14/17 0042   GLU  102*   NA  142   K  2.4*   CL  104   CO2  26   BUN  16   CREA  1.24   CA  9.2   AGAP  12   AP  75   TP  6.4   ALB  3.7   GLOB  2.7   AGRAT  1.4      CBC w/Diff Recent Labs      11/14/17 0042   WBC  5.8   RBC  3.99*   HGB  11.8*   HCT  35.1*   PLT  195      Cardiac Enzymes No results for input(s): CPK, CKND1, NORMAN in the last 72 hours. No lab exists for component: CKRMB, TROIP   Coagulation No results for input(s): PTP, INR, APTT in the last 72 hours. No lab exists for component: INREXT    Lipid Panel Lab Results   Component Value Date/Time    Cholesterol, total 120 09/23/2015 11:33 AM    HDL Cholesterol 32 09/23/2015 11:33 AM    LDL, calculated 56 09/23/2015 11:33 AM    VLDL, calculated 32 09/23/2015 11:33 AM    Triglyceride 160 09/23/2015 11:33 AM      BNP No results for input(s): BNPP in the last 72 hours. Liver Enzymes Recent Labs      11/14/17 0042   TP  6.4   ALB  3.7   AP  75   SGOT  20      Thyroid Studies Lab Results   Component Value Date/Time    TSH 0.943 03/09/2017 02:12 PM          Imaging:    MRI BRAIN WO CONT   Final Result   IMPRESSION:      1. White matter findings compatible with chronic small vessel ischemic disease. 2. No acute infarction. CT HEAD WO CONT   Final Result   IMPRESSION:      No acute intracranial abnormalities.       Date of Dictation: 11/14/2017 6:40 AM   .          Discharge Exam:  Visit Vitals    /82 (BP 1 Location: Left arm, BP Patient Position: Standing)    Pulse 68    Temp 98 °F (36.7 °C)    Resp 18    Ht 5' 11\" (1.803 m)    Wt 79.4 kg (175 lb)    SpO2 96%    BMI 24.41 kg/m2     General appearance: alert, cooperative, no distress, appears stated age  Lungs: clear to auscultation bilaterally  Heart: regular rate and rhythm, S1, S2 normal, no murmur, click, rub or gallop  Abdomen: soft, non-tender. Bowel sounds normal. No masses,  no organomegaly  Extremities: no cyanosis or edema  Neurologic: Grossly normal    Disposition: home  Discharge Condition: stable  Patient Instructions:   Current Discharge Medication List      CONTINUE these medications which have CHANGED    Details   potassium chloride SR (KLOR-CON 10) 10 mEq tablet Take 2 Tabs by mouth daily. Qty: 120 Tab, Refills: 5    Associated Diagnoses: Essential hypertension with goal blood pressure less than 140/90      sertraline (ZOLOFT) 100 mg tablet Take 2 Tabs by mouth daily. depression dx:F32.9  Qty: 180 Tab, Refills: 0    Associated Diagnoses: Depression, unspecified depression type         CONTINUE these medications which have NOT CHANGED    Details   omeprazole (PRILOSEC) 40 mg capsule Take 1 Cap by mouth daily. Qty: 90 Cap, Refills: 5    Associated Diagnoses: Esophageal stenosis      loratadine (CLARITIN) 10 mg tablet Take 1 Tab by mouth daily. Qty: 90 Tab, Refills: 11    Associated Diagnoses: Allergic rhinitis, unspecified allergic rhinitis trigger, unspecified rhinitis seasonality      baclofen (LIORESAL) 10 mg tablet 1-2 po TID prn muscle spasm - causes sleepiness  Qty: 100 Tab, Refills: 5    Associated Diagnoses: Spinal stenosis of lumbar region with radiculopathy      meclizine (ANTIVERT) 25 mg tablet Take 1 Tab by mouth three (3) times daily as needed. Qty: 90 Tab, Refills: 3    Associated Diagnoses: Dizziness      metoprolol succinate (TOPROL-XL) 50 mg XL tablet Take 1 Tab by mouth daily. Qty: 90 Tab, Refills: 3    Associated Diagnoses: Essential hypertension with goal blood pressure less than 140/90      raNITIdine (ZANTAC) 300 mg tablet Take 1 Tab by mouth daily.   Qty: 90 Tab, Refills: 3    Associated Diagnoses: Esophagitis      lovastatin (MEVACOR) 20 mg tablet Take 1 Tab by mouth nightly. Qty: 90 Tab, Refills: 3    Associated Diagnoses: Mixed hyperlipidemia      nitroglycerin (NITROSTAT) 0.4 mg SL tablet 1 Tab by SubLINGual route every five (5) minutes as needed for Chest Pain. X3. If unrelieved call 911  Qty: 3 Bottle, Refills: 1    Associated Diagnoses: Other chest pain      simethicone 125 mg chewable tablet Take 125 mg by mouth every six (6) hours as needed for Flatulence. Qty: 100 Tab, Refills: 5      busPIRone (BUSPAR) 15 mg tablet 1/2 - 1 po TID for anxiety  Qty: 90 Tab, Refills: 11    Associated Diagnoses: Fatigue, unspecified type      !! OTHER Disabled placard - This is to certify that this patient has an inability to ordinarily walk 100 feet nonstop without aggravating an existing medical condition, including the increase of pain. Qty: 1 Each, Refills: 11    Associated Diagnoses: Urinary incontinence, unspecified type      HYDROcodone-acetaminophen (NORCO) 7.5-325 mg per tablet TAKE 1 TABLET BY MOUTH 4 TIMES A DAY AS NEEDED FOR PAIN  Refills: 0      !! OTHER Knee High compression hose or socks  (20 - 30 mmHg if possible) Wear Daily Dx:   ( vericose veins, edema - R60.9)  Qty: 2 Each, Refills: 5    Associated Diagnoses: Ankle edema      traZODone (DESYREL) 50 mg tablet 1-3 po QHS for insomnia  Qty: 90 Tab, Refills: 5    Associated Diagnoses: Insomnia, unspecified type      dextroamphetamine-amphetamine (ADDERALL) 30 mg tablet Take 1 Tab by mouth three (3) times daily. Max Daily Amount: 3 Tabs. Qty: 90 Tab, Refills: 0      aspirin delayed-release 81 mg tablet Take 1 Tab by mouth daily. Qty: 90 Tab, Refills: 3    Associated Diagnoses: CAD (coronary artery disease)       ! ! - Potential duplicate medications found. Please discuss with provider.       STOP taking these medications       amLODIPine (NORVASC) 10 mg tablet Comments:   Reason for Stopping:         benazepril-hydroCHLOROthiazide (LOTENSIN HCT) 20-25 mg per tablet Comments:   Reason for Stopping:               Activity: PT/OT per Home Health  Diet: Cardiac Diet      Follow-up  Follow-up Appointments   Procedures    FOLLOW UP VISIT Appointment in: One Week Dr. Ronna Casey (PCP) for blood pressure recheck and recheck on vertigo. Dr. Ronna Casey (PCP) for blood pressure recheck and recheck on vertigo. Standing Status:   Standing     Number of Occurrences:   1     Order Specific Question:   Appointment in     Answer: One Week       Time spent to discharge patient 25 minutes  Signed:  Mary Rivera MD  11/15/2017  8:03 AM

## 2017-11-15 NOTE — PROGRESS NOTES
PM assessment complete. Alert, oriented x 4. Respirations even and unlabored, no distress noted. Abdomen soft, bowel sounds active in all 4 quadrants. Denies needs or pain. Aware to call should needs arise.

## 2017-11-15 NOTE — PROGRESS NOTES
Assessment done via doc flow sheet. Pt resting in bed, alert & oriented x3, resp easy & regular. Pt denies dizziness or pain. Bed low & locked, side rails x3 up with call light within reach, pt instructed to call for assistance as needed.

## 2017-11-26 ENCOUNTER — APPOINTMENT (OUTPATIENT)
Dept: CT IMAGING | Age: 72
End: 2017-11-26
Attending: EMERGENCY MEDICINE
Payer: MEDICARE

## 2017-11-26 ENCOUNTER — HOSPITAL ENCOUNTER (EMERGENCY)
Age: 72
Discharge: HOME | End: 2017-11-26
Attending: EMERGENCY MEDICINE
Payer: MEDICARE

## 2017-11-26 VITALS
DIASTOLIC BLOOD PRESSURE: 85 MMHG | WEIGHT: 170 LBS | HEIGHT: 71 IN | TEMPERATURE: 97.9 F | SYSTOLIC BLOOD PRESSURE: 120 MMHG | HEART RATE: 86 BPM | BODY MASS INDEX: 23.8 KG/M2 | OXYGEN SATURATION: 99 % | RESPIRATION RATE: 16 BRPM

## 2017-11-26 DIAGNOSIS — R10.9 FLANK PAIN: Primary | ICD-10-CM

## 2017-11-26 PROCEDURE — 99283 EMERGENCY DEPT VISIT LOW MDM: CPT | Performed by: EMERGENCY MEDICINE

## 2017-11-26 PROCEDURE — 74176 CT ABD & PELVIS W/O CONTRAST: CPT

## 2017-11-26 RX ORDER — LIDOCAINE 50 MG/G
PATCH TOPICAL
Qty: 7 EACH | Refills: 0 | Status: SHIPPED | OUTPATIENT
Start: 2017-11-26 | End: 2018-04-15

## 2017-11-26 NOTE — DISCHARGE INSTRUCTIONS
Back Pain: Care Instructions  Your Care Instructions    Back pain has many possible causes. It is often related to problems with muscles and ligaments of the back. It may also be related to problems with the nerves, discs, or bones of the back. Moving, lifting, standing, sitting, or sleeping in an awkward way can strain the back. Sometimes you don't notice the injury until later. Arthritis is another common cause of back pain. Although it may hurt a lot, back pain usually improves on its own within several weeks. Most people recover in 12 weeks or less. Using good home treatment and being careful not to stress your back can help you feel better sooner. Follow-up care is a key part of your treatment and safety. Be sure to make and go to all appointments, and call your doctor if you are having problems. It's also a good idea to know your test results and keep a list of the medicines you take. How can you care for yourself at home? · Sit or lie in positions that are most comfortable and reduce your pain. Try one of these positions when you lie down:  ¨ Lie on your back with your knees bent and supported by large pillows. ¨ Lie on the floor with your legs on the seat of a sofa or chair. Joshua Ports on your side with your knees and hips bent and a pillow between your legs. ¨ Lie on your stomach if it does not make pain worse. · Do not sit up in bed, and avoid soft couches and twisted positions. Bed rest can help relieve pain at first, but it delays healing. Avoid bed rest after the first day of back pain. · Change positions every 30 minutes. If you must sit for long periods of time, take breaks from sitting. Get up and walk around, or lie in a comfortable position. · Try using a heating pad on a low or medium setting for 15 to 20 minutes every 2 or 3 hours. Try a warm shower in place of one session with the heating pad. · You can also try an ice pack for 10 to 15 minutes every 2 to 3 hours.  Put a thin cloth between the ice pack and your skin. · Take pain medicines exactly as directed. ¨ If the doctor gave you a prescription medicine for pain, take it as prescribed. ¨ If you are not taking a prescription pain medicine, ask your doctor if you can take an over-the-counter medicine. · Take short walks several times a day. You can start with 5 to 10 minutes, 3 or 4 times a day, and work up to longer walks. Walk on level surfaces and avoid hills and stairs until your back is better. · Return to work and other activities as soon as you can. Continued rest without activity is usually not good for your back. · To prevent future back pain, do exercises to stretch and strengthen your back and stomach. Learn how to use good posture, safe lifting techniques, and proper body mechanics. When should you call for help? Call your doctor now or seek immediate medical care if:  ? · You have new or worsening numbness in your legs. ? · You have new or worsening weakness in your legs. (This could make it hard to stand up.)   ? · You lose control of your bladder or bowels. ? Watch closely for changes in your health, and be sure to contact your doctor if:  ? · Your pain gets worse. ? · You are not getting better after 2 weeks. Where can you learn more? Go to http://oracio-gage.info/. Enter D539 in the search box to learn more about \"Back Pain: Care Instructions. \"  Current as of: March 21, 2017  Content Version: 11.4  © 4740-8641 [employer]. Care instructions adapted under license by [employer] (which disclaims liability or warranty for this information). If you have questions about a medical condition or this instruction, always ask your healthcare professional. [place] [address] any warranty or liability for your use of this information.   Mri Brain Wo Cont    Result Date: 11/14/2017  MRI BRAIN WITHOUT CONTRAST 11/14/2017 HISTORY: Significant dizziness with standing. Gait disturbance beginning yesterday afternoon. COMPARISON: Head CT November 14, 2017 FINDINGS: There is no acute infarction, acute intracranial hemorrhage or significant mass effect. Mild diffuse cerebral volume loss. On the T2-weighted and FLAIR sequences, there are multiple hyperintense white matter lesions. Findings are nonspecific and can be seen with chronic small vessel ischemic disease, demyelinating disease or with migraine headaches. There is no large cerebellopontine angle cistern mass. IMPRESSION: 1. White matter findings compatible with chronic small vessel ischemic disease. 2. No acute infarction. Ct Head Wo Cont    Result Date: 11/14/2017  CT HEAD WITHOUT CONTRAST HISTORY:  Dizziness. COMPARISON: None. TECHNIQUE: Axial imaging was performed without intravenous contrast utilizing 5mm slice thickness. Sagittal and coronal reformats were performed. Radiation dose reduction techniques were used for this study. Our CT scanner uses one or all of the following: Automated exposure control, adjustment of the MAS or KUB according to patient's size and iterative reconstruction. FINDINGS:    *BRAIN:    -  There are no early signs of territorial or lacunar infarction by CT.    -  No intracranial mass, hematoma, or hydrocephalus. -  For patient's age, the scattered areas of white matter hypodensities may represent a chronic small vessel white matter ischemia. However this is nonspecific. *VISUALIZED PARANASAL SINUSES: Well aerated. *MASTOIDS:  Clear. *CALVARIUM AND SCALP: Unremarkable. IMPRESSION: No acute intracranial abnormalities. Date of Dictation: 11/14/2017 6:40 AM .    Ct Abd Pelv Wo Cont    Result Date: 11/26/2017  History: Right flank pain, onset yesterday EXAM: CT abdomen and pelvis without contrast TECHNIQUE: Thin section axial CT images are obtained from the lung bases through the pubic symphysis. No oral or IV contrast is administered.  Radiation dose reduction techniques were used for this study. Our CT scanners use one or all of the following: Automated exposure control, adjustment of the mA and/or kV according to patient size, use of iterative reconstruction. No comparison FINDINGS: There is minimal bibasilar atelectasis present. CT ABDOMEN: No contour deforming abnormality of the liver or spleen. The patient is status post cholecystectomy. The pancreas and adrenal glands are normal. Bilateral renal cysts again noted. Calcifications noted within the left renal hilum. No evidence of obstructive uropathy. The pancreas and adrenal glands are normal. Bowel loops in the upper abdomen are normal. No definite upper abdominal lymphadenopathy demonstrated. CT PELVIS: The appendix is normal. The bladder and rectum are normal. Bone window evaluation demonstrates no aggressive osseous lesions. IMPRESSION: No significant interval change when compared with the prior exam. No acute abnormality.

## 2017-11-26 NOTE — ED TRIAGE NOTES
Patient complaining of right flank pain that started last night. Patient advises sharp in nature. Patient with no further complaints at this time.

## 2017-11-26 NOTE — ED NOTES
I have reviewed discharge instructions with the patient. The patient verbalized understanding. Patient left ED via Discharge Method: ambulatory to Home with self, son in route to  patient. Opportunity for questions and clarification provided. Patient given 1 scripts. To continue your aftercare when you leave the hospital, you may receive an automated call from our care team to check in on how you are doing. This is a free service and part of our promise to provide the best care and service to meet your aftercare needs.  If you have questions, or wish to unsubscribe from this service please call [phone]. Thank you for Choosing our [facility] Emergency Department.

## 2017-11-26 NOTE — ED PROVIDER NOTES
HPI Comments: Chidi Gay is a very pleasant 73-year-old male with a remote history of renal stones or presents to the emergency department with acute onset of right flank pain radiating to his groin    Started yesterday intermittent severe    No nausea vomiting. No fever or chills. No diarrhea. Patient is a 71 y.o. male presenting with flank pain. Flank Pain           Past Medical History:   Diagnosis Date    Allergic rhinitis, cause unspecified 3/8/2013    CAD (coronary artery disease) 1/3/2013    MI age 62- no intervention; affirms SOB with 1 flight of steps    Cancer [facility])     Chest pain     Chronic pain     lumbar pain    Depression     Dizziness 3/8/2013    Dyslipidemia     Esophageal stenosis 8/16/2013    dxed on EGD early 2000's. Never dilated    Esophagitis 1/3/2013    GERD with esophagitis     not completely controlled with zantac    HTN (hypertension) 1/3/2013    controlled with med    Kidney stones 1/3/2013    Narcolepsy 1/3/2013    Prostate cancer ([address] 1/3/2013    Vertigo, benign positional        Past Surgical History:   Procedure Laterality Date    HX CARPAL TUNNEL RELEASE      HX CHOLECYSTECTOMY  2008?  HX LITHOTRIPSY      HX LUMBAR LAMINECTOMY      x6 Dr. Natalya Morales HX ORTHOPAEDIC  2004    carpel tunnel/left hand    HX ORTHOPAEDIC  1995    carpel tunnel/right hand    HX PROSTATECTOMY  2008? Dr. Nolan Krabbe      Laminectomy         Family History:   Problem Relation Age of Onset    Depression Mother     Alcohol abuse Father     Depression Sister     Depression Brother     Depression Brother     Depression Sister     Depression Sister        Social History     Social History    Marital status:      Spouse name: N/A    Number of children: N/A    Years of education: N/A     Occupational History    Not on file.      Social History Main Topics    Smoking status: Never Smoker    Smokeless tobacco: Never Used    Alcohol use No    Drug use: No    Sexual activity: Not on file     Other Topics Concern     Service Yes     reserves - 8 m 1966    Blood Transfusions No    Caffeine Concern Yes     3 pepsis a day    Special Diet No    Exercise Yes     yard work, does some in the bed for neck   [place] Yes     Social History Narrative         ALLERGIES: Review of patient's allergies indicates no known allergies. Review of Systems   Constitutional: Negative for fatigue. Respiratory: Negative. Genitourinary: Positive for flank pain. Vitals:    11/26/17 1417   BP: 124/79   Pulse: 79   Resp: 16   Temp: 97.4 °F (36.3 °C)   SpO2: 99%   Weight: 77.1 kg (170 lb)   Height: 5' 11\" (1.803 m)            Physical Exam   Constitutional: He is oriented to person, place, and time. He appears well-developed and well-nourished. HENT:   Head: Normocephalic and atraumatic. Eyes: Pupils are equal, round, and reactive to light. Neck: Normal range of motion. Cardiovascular: Normal rate and regular rhythm. Pulmonary/Chest: Breath sounds normal. No respiratory distress. Abdominal: Soft. He exhibits no distension. There is no tenderness. There is no rebound and no guarding. Musculoskeletal: Normal range of motion. He exhibits tenderness. He exhibits no edema. Neurological: He is alert and oriented to person, place, and time. Skin: Skin is warm and dry. Psychiatric: He has a normal mood and affect. His behavior is normal.   Nursing note and vitals reviewed. MDM  Number of Diagnoses or Management Options  Diagnosis management comments: Well appearing male with right flank pain    His history of stones. A CT was performed which was negative for any intra-abdominal process    He does grab at his side occasionally. Review of his chart shows that he has spinal stenosis and a certain could be causing his pain    No fever. Stable vitals. I don't think further evaluation is indicated this time.   We'll discharge him home           Amount and/or Complexity of Data Reviewed  Tests in the radiology section of CPT®: reviewed      ED Course       Procedures

## 2018-01-10 PROBLEM — F33.9 RECURRENT DEPRESSION (HCC): Status: ACTIVE | Noted: 2018-01-10

## 2018-01-30 ENCOUNTER — HOSPITAL ENCOUNTER (EMERGENCY)
Age: 73
Discharge: HOME | End: 2018-01-30
Attending: EMERGENCY MEDICINE
Payer: MEDICARE

## 2018-01-30 VITALS
SYSTOLIC BLOOD PRESSURE: 181 MMHG | TEMPERATURE: 97.4 F | BODY MASS INDEX: 10.98 KG/M2 | WEIGHT: 78.44 LBS | OXYGEN SATURATION: 96 % | HEIGHT: 71 IN | DIASTOLIC BLOOD PRESSURE: 92 MMHG | HEART RATE: 82 BPM | RESPIRATION RATE: 18 BRPM

## 2018-01-30 DIAGNOSIS — M54.50 ACUTE EXACERBATION OF CHRONIC LOW BACK PAIN: Primary | ICD-10-CM

## 2018-01-30 DIAGNOSIS — I10 ESSENTIAL HYPERTENSION: ICD-10-CM

## 2018-01-30 DIAGNOSIS — G89.29 ACUTE EXACERBATION OF CHRONIC LOW BACK PAIN: Primary | ICD-10-CM

## 2018-01-30 PROCEDURE — 99282 EMERGENCY DEPT VISIT SF MDM: CPT | Performed by: EMERGENCY MEDICINE

## 2018-01-30 PROCEDURE — 74011250636 HC RX REV CODE- 250/636: Performed by: EMERGENCY MEDICINE

## 2018-01-30 PROCEDURE — 96374 THER/PROPH/DIAG INJ IV PUSH: CPT | Performed by: EMERGENCY MEDICINE

## 2018-01-30 PROCEDURE — 81003 URINALYSIS AUTO W/O SCOPE: CPT | Performed by: EMERGENCY MEDICINE

## 2018-01-30 RX ORDER — KETOROLAC TROMETHAMINE 30 MG/ML
15 INJECTION, SOLUTION INTRAMUSCULAR; INTRAVENOUS
Status: COMPLETED | OUTPATIENT
Start: 2018-01-30 | End: 2018-01-30

## 2018-01-30 RX ADMIN — KETOROLAC TROMETHAMINE 15 MG: 30 INJECTION, SOLUTION INTRAMUSCULAR at 04:17

## 2018-01-30 NOTE — ED TRIAGE NOTES
Pt mc/o pain to right side of back which is chronic, tonight pain is raidated to right lower abd with spasm, pt has hx of kidney stones in 1965

## 2018-01-30 NOTE — ED NOTES
I have reviewed discharge instructions with the patient. The patient verbalized understanding. Patient left ED via Discharge Method: ambulatory to Home). Opportunity for questions and clarification provided. Patient given 0 scripts. To continue your aftercare when you leave the hospital, you may receive an automated call from our care team to check in on how you are doing. This is a free service and part of our promise to provide the best care and service to meet your aftercare needs.  If you have questions, or wish to unsubscribe from this service please call [phone]. Thank you for Choosing our [facility] Emergency Department.

## 2018-01-30 NOTE — ED PROVIDER NOTES
HPI Comments: Chronic back pain, worse tonight but did not take his pain medication prior to coming in \"because it does not work quick enough. \"    Patient is a 71 y.o. male presenting with back pain. The history is provided by the patient. Back Pain    This is a chronic problem. The problem has not changed since onset. The problem occurs constantly. The quality of the pain is described as sharp. The pain radiates to the right thigh and right groin. The pain is severe. The symptoms are aggravated by certain positions. Pertinent negatives include no fever, no numbness, no weight loss, no abdominal pain, no perianal numbness, no dysuria, no paresthesias, no paresis, no tingling and no weakness. He has tried nothing (has not taken his norco since the afternoon) for the symptoms. Past Medical History:   Diagnosis Date    Allergic rhinitis, cause unspecified 3/8/2013    CAD (coronary artery disease) 1/3/2013    MI age 62- no intervention; affirms SOB with 1 flight of steps    Cancer [facility])     Chest pain     Chronic pain     lumbar pain    Depression     Dizziness 3/8/2013    Dyslipidemia     Esophageal stenosis 8/16/2013    dxed on EGD early 2000's. Never dilated    Esophagitis 1/3/2013    GERD with esophagitis     not completely controlled with zantac    HTN (hypertension) 1/3/2013    controlled with med    Kidney stones 1/3/2013    Narcolepsy 1/3/2013    Prostate cancer ([facility] [address] 1/3/2013    Vertigo, benign positional        Past Surgical History:   Procedure Laterality Date    HX CARPAL TUNNEL RELEASE      HX CHOLECYSTECTOMY  2008?  HX LITHOTRIPSY      HX LUMBAR LAMINECTOMY      x6 Dr. Roland Sandhu HX ORTHOPAEDIC  2004    carpel tunnel/left hand    HX ORTHOPAEDIC  1995    carpel tunnel/right hand    HX PROSTATECTOMY  2008?     Dr. Janet Butler      Laminectomy         Family History:   Problem Relation Age of Onset    Depression Mother     Alcohol abuse Father  Depression Sister     Depression Brother     Depression Brother     Depression Sister     Depression Sister        Social History     Social History    Marital status:      Spouse name: N/A    Number of children: N/A    Years of education: N/A     Occupational History    Not on file. Social History Main Topics    Smoking status: Never Smoker    Smokeless tobacco: Never Used    Alcohol use No    Drug use: No    Sexual activity: Not on file     Other Topics Concern     Service Yes     reserves - 8 m 1966    Blood Transfusions No    Caffeine Concern Yes     3 pepsis a day    Special Diet No    Exercise Yes     yard work, does some in the bed for neck   [place] Yes     Social History Narrative         ALLERGIES: Review of patient's allergies indicates no known allergies. Review of Systems   Constitutional: Negative for fever, unexpected weight change and weight loss. Gastrointestinal: Negative for abdominal pain, nausea and vomiting. Genitourinary: Negative for dysuria, hematuria and urgency. Musculoskeletal: Positive for back pain. Negative for myalgias. Neurological: Negative for tingling, weakness, numbness and paresthesias. Vitals:    01/30/18 0358   BP: (!) 181/92   Pulse: 82   Resp: 18   Temp: 97.4 °F (36.3 °C)   SpO2: 96%   Weight: 35.6 kg (78 lb 7 oz)   Height: 5' 11\" (1.803 m)            Physical Exam   Constitutional: He appears well-developed and well-nourished. No distress. Cardiovascular: Normal rate, regular rhythm and normal heart sounds. Pulses:       Radial pulses are 2+ on the right side, and 2+ on the left side. Femoral pulses are 2+ on the right side, and 2+ on the left side. Pulmonary/Chest: Effort normal and breath sounds normal.   Abdominal: Soft. He exhibits no distension and no mass. There is no tenderness. No pulsatile abdominal mass   Musculoskeletal: Normal range of motion. Neurological: He has normal strength.  No sensory deficit. Nursing note and vitals reviewed. MDM  Number of Diagnoses or Management Options  Diagnosis management comments: Last 3 CTs for kidney stone had been negative. Patient has chronic low back pain with right leg radiculopathy. I do not appreciate a pulsatile abdominal masses femoral pulses are equal.  Doubt aortic dissection or a pulled ruptured AAA. No signs of cauda equina syndrome. 4:40 AM  Patient improved. Blood pressure remains elevated. Patient will follow up with his primary care doctor within 1 week for a blood pressure recheck.        Amount and/or Complexity of Data Reviewed  Clinical lab tests: ordered and reviewed      ED Course       Procedures

## 2018-01-30 NOTE — DISCHARGE INSTRUCTIONS
Chronic Pain: Care Instructions  Your Care Instructions    Chronic pain is pain that lasts a long time (months or even years) and may or may not have a clear cause. It is different from acute pain, which usually does have a clear cause-like an injury or illness-and gets better over time. Chronic pain:  · Lasts over time but may vary from day to day. · Does not go away despite efforts to end it. · May disrupt your sleep and lead to fatigue. · May cause depression or anxiety. · May make your muscles tense, causing more pain. · Can disrupt your work, hobbies, home life, and relationships with friends and family. Chronic pain is a very real condition. It is not just in your head. Treatment can help and usually includes several methods used together, such as medicines, physical therapy, exercise, and other treatments. Learning how to relax and changing negative thought patterns can also help you cope. Chronic pain is complex. Taking an active role in your treatment will help you better manage your pain. Tell your doctor if you have trouble dealing with your pain. You may have to try several things before you find what works best for you. Follow-up care is a key part of your treatment and safety. Be sure to make and go to all appointments, and call your doctor if you are having problems. It's also a good idea to know your test results and keep a list of the medicines you take. How can you care for yourself at home? · Pace yourself. Break up large jobs into smaller tasks. Save harder tasks for days when you have less pain, or go back and forth between hard tasks and easier ones. Take rest breaks. · Relax, and reduce stress. Relaxation techniques such as deep breathing or meditation can help. · Keep moving. Gentle, daily exercise can help reduce pain over the long run. Try low- or no-impact exercises such as walking, swimming, and stationary biking. Do stretches to stay flexible.   · Try heat, cold packs, and massage. · Get enough sleep. Chronic pain can make you tired and drain your energy. Talk with your doctor if you have trouble sleeping because of pain. · Think positive. Your thoughts can affect your pain level. Do things that you enjoy to distract yourself when you have pain instead of focusing on the pain. See a movie, read a book, listen to music, or spend time with a friend. · If you think you are depressed, talk to your doctor about treatment. · Keep a daily pain diary. Record how your moods, thoughts, sleep patterns, activities, and medicine affect your pain. You may find that your pain is worse during or after certain activities or when you are feeling a certain emotion. Having a record of your pain can help you and your doctor find the best ways to treat your pain. · Take pain medicines exactly as directed. ¨ If the doctor gave you a prescription medicine for pain, take it as prescribed. ¨ If you are not taking a prescription pain medicine, ask your doctor if you can take an over-the-counter medicine. Reducing constipation caused by pain medicine  · Include fruits, vegetables, beans, and whole grains in your diet each day. These foods are high in fiber. · Drink plenty of fluids, enough so that your urine is light yellow or clear like water. If you have kidney, heart, or liver disease and have to limit fluids, talk with your doctor before you increase the amount of fluids you drink. · If your doctor recommends it, get more exercise. Walking is a good choice. Bit by bit, increase the amount you walk every day. Try for at least 30 minutes on most days of the week. · Schedule time each day for a bowel movement. A daily routine may help. Take your time and do not strain when having a bowel movement. When should you call for help? Call your doctor now or seek immediate medical care if:  ? · Your pain gets worse or is out of control.    ? · You feel down or blue, or you do not enjoy things like you once did. You may be depressed, which is common in people with chronic pain. Depression can be treated. ? · You have vomiting or cramps for more than 2 hours. ? Watch closely for changes in your health, and be sure to contact your doctor if:  ? · You cannot sleep because of pain. ? · You are very worried or anxious about your pain. ? · You have trouble taking your pain medicine. ? · You have any concerns about your pain medicine. ? · You have trouble with bowel movements, such as:  ¨ No bowel movement in 3 days. ¨ Blood in the anal area, in your stool, or on the toilet paper. ¨ Diarrhea for more than 24 hours. Where can you learn more? Go to http://oracio-gage.info/. Enter N004 in the search box to learn more about \"Chronic Pain: Care Instructions. \"  Current as of: October 14, 2016  Content Version: 11.4  © 6332-1041 [employer]. Care instructions adapted under license by [employer] (which disclaims liability or warranty for this information). If you have questions about a medical condition or this instruction, always ask your healthcare professional. [place] [address] any warranty or liability for your use of this information.

## 2018-04-15 PROBLEM — R32 URINARY INCONTINENCE: Status: ACTIVE | Noted: 2018-04-15

## 2018-06-30 ENCOUNTER — HOSPITAL ENCOUNTER (EMERGENCY)
Age: 73
Discharge: HOME | End: 2018-06-30
Attending: EMERGENCY MEDICINE
Payer: MEDICARE

## 2018-06-30 VITALS
TEMPERATURE: 97.7 F | RESPIRATION RATE: 16 BRPM | HEIGHT: 70 IN | DIASTOLIC BLOOD PRESSURE: 99 MMHG | HEART RATE: 80 BPM | WEIGHT: 173 LBS | SYSTOLIC BLOOD PRESSURE: 128 MMHG | BODY MASS INDEX: 24.77 KG/M2 | OXYGEN SATURATION: 96 %

## 2018-06-30 DIAGNOSIS — M54.12 CERVICAL RADICULOPATHY: Primary | ICD-10-CM

## 2018-06-30 LAB
ANION GAP SERPL CALC-SCNC: 10 MMOL/L (ref 7–16)
BASOPHILS # BLD: 0 K/UL (ref 0–0.2)
BASOPHILS NFR BLD: 0 % (ref 0–2)
BUN SERPL-MCNC: 17 MG/DL (ref 8–23)
CALCIUM SERPL-MCNC: 9.1 MG/DL (ref 8.3–10.4)
CHLORIDE SERPL-SCNC: 105 MMOL/L (ref 98–107)
CO2 SERPL-SCNC: 29 MMOL/L (ref 21–32)
CREAT SERPL-MCNC: 1.62 MG/DL (ref 0.8–1.5)
DIFFERENTIAL METHOD BLD: ABNORMAL
EOSINOPHIL # BLD: 0.2 K/UL (ref 0–0.8)
EOSINOPHIL NFR BLD: 4 % (ref 0.5–7.8)
ERYTHROCYTE [DISTWIDTH] IN BLOOD BY AUTOMATED COUNT: 14.5 % (ref 11.9–14.6)
GLUCOSE SERPL-MCNC: 107 MG/DL (ref 65–100)
HCT VFR BLD AUTO: 38.4 % (ref 41.1–50.3)
HGB BLD-MCNC: 13.1 G/DL (ref 13.6–17.2)
IMM GRANULOCYTES # BLD: 0 K/UL (ref 0–0.5)
IMM GRANULOCYTES NFR BLD AUTO: 0 % (ref 0–5)
LYMPHOCYTES # BLD: 1.8 K/UL (ref 0.5–4.6)
LYMPHOCYTES NFR BLD: 35 % (ref 13–44)
MCH RBC QN AUTO: 29.8 PG (ref 26.1–32.9)
MCHC RBC AUTO-ENTMCNC: 34.1 G/DL (ref 31.4–35)
MCV RBC AUTO: 87.5 FL (ref 79.6–97.8)
MONOCYTES # BLD: 0.7 K/UL (ref 0.1–1.3)
MONOCYTES NFR BLD: 14 % (ref 4–12)
NEUTS SEG # BLD: 2.3 K/UL (ref 1.7–8.2)
NEUTS SEG NFR BLD: 47 % (ref 43–78)
PLATELET # BLD AUTO: 224 K/UL (ref 150–450)
PMV BLD AUTO: 10 FL (ref 10.8–14.1)
POTASSIUM SERPL-SCNC: 3.3 MMOL/L (ref 3.5–5.1)
RBC # BLD AUTO: 4.39 M/UL (ref 4.23–5.67)
SODIUM SERPL-SCNC: 144 MMOL/L (ref 136–145)
TROPONIN I BLD-MCNC: 0 NG/ML (ref 0.02–0.05)
WBC # BLD AUTO: 5 K/UL (ref 4.3–11.1)

## 2018-06-30 PROCEDURE — 80048 BASIC METABOLIC PNL TOTAL CA: CPT | Performed by: EMERGENCY MEDICINE

## 2018-06-30 PROCEDURE — 84484 ASSAY OF TROPONIN QUANT: CPT

## 2018-06-30 PROCEDURE — 74011636637 HC RX REV CODE- 636/637: Performed by: EMERGENCY MEDICINE

## 2018-06-30 PROCEDURE — 99285 EMERGENCY DEPT VISIT HI MDM: CPT | Performed by: EMERGENCY MEDICINE

## 2018-06-30 PROCEDURE — 93005 ELECTROCARDIOGRAM TRACING: CPT | Performed by: EMERGENCY MEDICINE

## 2018-06-30 PROCEDURE — 85025 COMPLETE CBC W/AUTO DIFF WBC: CPT | Performed by: EMERGENCY MEDICINE

## 2018-06-30 RX ORDER — PREDNISONE 20 MG/1
40 TABLET ORAL DAILY
Qty: 10 TAB | Refills: 0 | Status: SHIPPED | OUTPATIENT
Start: 2018-06-30 | End: 2018-07-05

## 2018-06-30 RX ORDER — PREDNISONE 20 MG/1
40 TABLET ORAL
Status: COMPLETED | OUTPATIENT
Start: 2018-06-30 | End: 2018-06-30

## 2018-06-30 RX ADMIN — PREDNISONE 40 MG: 20 TABLET ORAL at 23:05

## 2018-07-01 LAB
ATRIAL RATE: 72 BPM
CALCULATED P AXIS, ECG09: 29 DEGREES
CALCULATED R AXIS, ECG10: 13 DEGREES
CALCULATED T AXIS, ECG11: 21 DEGREES
DIAGNOSIS, 93000: NORMAL
P-R INTERVAL, ECG05: 144 MS
Q-T INTERVAL, ECG07: 372 MS
QRS DURATION, ECG06: 80 MS
QTC CALCULATION (BEZET), ECG08: 407 MS
VENTRICULAR RATE, ECG03: 72 BPM

## 2018-07-01 NOTE — ED NOTES
I have reviewed discharge instructions with the patient. The patient verbalized understanding. Patient left ED via Discharge Method: ambulatory to Home with self. Opportunity for questions and clarification provided. Patient given 1 scripts. To continue your aftercare when you leave the hospital, you may receive an automated call from our care team to check in on how you are doing. This is a free service and part of our promise to provide the best care and service to meet your aftercare needs.  If you have questions, or wish to unsubscribe from this service please call [phone]. Thank you for Choosing our [facility] Emergency Department.

## 2018-07-01 NOTE — DISCHARGE INSTRUCTIONS
Pinched Nerve in the Neck: Care Instructions  Your Care Instructions  A pinched nerve in the neck happens when a vertebra or disc in the upper part of your spine is damaged. This damage can happen because of an injury. Or it can just happen with age. The changes caused by the damage may put pressure on a nearby nerve root, pinching it. This causes symptoms such as sharp pain in your neck, shoulder, arm, hand, or back. You may also have tingling or numbness. Sometimes it makes your arm weaker. The symptoms are usually worse when you turn your head or strain your neck. For many people, the symptoms get better over time and finally go away. Early treatment usually includes medicines for pain and swelling. Sometimes physical therapy and special exercises may help. Follow-up care is a key part of your treatment and safety. Be sure to make and go to all appointments, and call your doctor if you are having problems. It's also a good idea to know your test results and keep a list of the medicines you take. How can you care for yourself at home? · Be safe with medicines. Read and follow all instructions on the label. ¨ If the doctor gave you a prescription medicine for pain, take it as prescribed. ¨ If you are not taking a prescription pain medicine, ask your doctor if you can take an over-the-counter medicine. · Try using a heating pad on a low or medium setting for 15 to 20 minutes every 2 or 3 hours. Try a warm shower in place of one session with the heating pad. You can also buy single-use heat wraps that last up to 8 hours. · You can also try an ice pack for 10 to 15 minutes every 2 to 3 hours. There isn't strong evidence that either heat or ice will help. But you can try them to see if they help you. · Don't spend too long in one position. Take short breaks to move around and change positions. · Wear a seat belt and shoulder harness when you are in a car.   · Sleep with a pillow under your head and neck that keeps your neck straight. · If you were given a neck brace (cervical collar) to limit neck motion, wear it as instructed for as many days as your doctor tells you to. Do not wear it longer than you were told to. Wearing a brace for too long can lead to neck stiffness and can weaken the neck muscles. · Follow your doctor's instructions for gentle neck-stretching exercises. · Do not smoke. Smoking can slow healing of your discs. If you need help quitting, talk to your doctor about stop-smoking programs and medicines. These can increase your chances of quitting for good. · Avoid strenuous work or exercise until your doctor says it is okay. When should you call for help? Call 911 anytime you think you may need emergency care. For example, call if:  ? · You are unable to move an arm or a leg at all. ?Call your doctor now or seek immediate medical care if:  ? · You have new or worse symptoms in your arms, legs, chest, belly, or buttocks. Symptoms may include:  ¨ Numbness or tingling. ¨ Weakness. ¨ Pain. ? · You lose bladder or bowel control. ? Watch closely for changes in your health, and be sure to contact your doctor if:  ? · You are not getting better as expected. Where can you learn more? Go to http://oracio-gage.info/. Enter N710 in the search box to learn more about \"Pinched Nerve in the Neck: Care Instructions. \"  Current as of: March 21, 2017  Content Version: 11.5  © 6045-7058 [employer]. Care instructions adapted under license by [employer] (which disclaims liability or warranty for this information). If you have questions about a medical condition or this instruction, always ask your healthcare professional. [address] any warranty or liability for your use of this information.

## 2018-07-01 NOTE — ED PROVIDER NOTES
HPI Comments: 78-year-old white male presents with an electrical shooting discomfort to his left arm intermittently for the past 2 days. He states it lasts a few minutes and resolves with movement. He did have a an episode of chest pain 2 days ago. At that time he took nitroglycerin and the pain resolved his had no further chest pain. He denies numbness and weakness in his extremity. He has had no facial symptoms. No lower extremity symptoms. Does report that years ago he was supposed to have neck surgery due to disc disease but he never followed through with that. Patient is a 71 y.o. male presenting with arm pain. The history is provided by the patient. Arm Pain    Associated symptoms include neck pain. Pertinent negatives include no back pain. Past Medical History:   Diagnosis Date    Allergic rhinitis, cause unspecified 3/8/2013    CAD (coronary artery disease) 1/3/2013    MI age 62- no intervention; affirms SOB with 1 flight of steps    Cancer [facility])     Chest pain     Chronic pain     lumbar pain    Depression     Dizziness 3/8/2013    Dyslipidemia     Esophageal stenosis 8/16/2013    dxed on EGD early 2000's. Never dilated    Esophagitis 1/3/2013    GERD with esophagitis     not completely controlled with zantac    HTN (hypertension) 1/3/2013    controlled with med    Kidney stones 1/3/2013    Narcolepsy 1/3/2013    Prostate cancer ([facility] [address] 1/3/2013    Vertigo, benign positional        Past Surgical History:   Procedure Laterality Date    HX CARPAL TUNNEL RELEASE      HX CHOLECYSTECTOMY  2008?  HX LITHOTRIPSY      HX LUMBAR LAMINECTOMY      x6 Dr. Akiko Sandoval HX ORTHOPAEDIC  2004    carpel tunnel/left hand    HX ORTHOPAEDIC  1995    carpel tunnel/right hand    HX PROSTATECTOMY  2008?     Dr. Mai Lama      Laminectomy         Family History:   Problem Relation Age of Onset    Depression Mother     Alcohol abuse Father     Depression Sister     Depression Brother     Depression Brother     Depression Sister     Depression Sister        Social History     Social History    Marital status:      Spouse name: N/A    Number of children: N/A    Years of education: N/A     Occupational History    Not on file. Social History Main Topics    Smoking status: Never Smoker    Smokeless tobacco: Never Used    Alcohol use No    Drug use: No    Sexual activity: Not on file     Other Topics Concern     Service Yes     reserves - 8 m 1966    Blood Transfusions No    Caffeine Concern Yes     3 pepsis a day    Special Diet No    Exercise Yes     yard work, does some in the bed for neck   [address] [facility] Yes     Social History Narrative         ALLERGIES: Review of patient's allergies indicates no known allergies. Review of Systems   Constitutional: Negative for fever. Respiratory: Negative for cough and shortness of breath. Gastrointestinal: Negative for abdominal pain and vomiting. Genitourinary: Negative for dysuria. Musculoskeletal: Positive for neck pain. Negative for back pain. Skin: Negative for rash. Neurological: Negative for headaches. Vitals:    06/30/18 2220   BP: 145/89   Pulse: 82   Resp: 20   Temp: 97.7 °F (36.5 °C)   SpO2: 96%   Weight: 78.5 kg (173 lb)   Height: 5' 10\" (1.778 m)            Physical Exam   Constitutional: He is oriented to person, place, and time. He appears well-developed and well-nourished. No distress. HENT:   Head: Normocephalic and atraumatic. Eyes: Pupils are equal, round, and reactive to light. Neck: Normal range of motion. Neck supple. Tenderness to left trapezius   Cardiovascular: Normal rate and regular rhythm. No murmur heard. Pulmonary/Chest: Effort normal and breath sounds normal. He has no wheezes. Musculoskeletal: Normal range of motion. He exhibits no edema or tenderness. Neurological: He is alert and oriented to person, place, and time.  He has normal reflexes. No cranial nerve deficit. He exhibits normal muscle tone. Coordination normal.   Skin: Skin is warm and dry. Psychiatric: He has a normal mood and affect. Nursing note and vitals reviewed. MDM  Number of Diagnoses or Management Options  Diagnosis management comments: EKG normal sinus rhythm without diagnostic ST changes. patient's symptoms are very consistent with cervical radiculopathy. As he has had no chest pain for 2 days and did not feel that cardiac workup is necessary at this time. He has a normal neuro exam and does not appear to have had a CVA. For now we'll treat with prednisone. Will refer him to his primary care and his cardiologist for further workup.     Risk of Complications, Morbidity, and/or Mortality  Presenting problems: low  Diagnostic procedures: low  Management options: low          ED Course       Procedures

## 2018-07-08 ENCOUNTER — HOSPITAL ENCOUNTER (EMERGENCY)
Age: 73
Discharge: HOME | End: 2018-07-09
Payer: MEDICARE

## 2018-07-08 ENCOUNTER — APPOINTMENT (OUTPATIENT)
Dept: CT IMAGING | Age: 73
End: 2018-07-08
Payer: MEDICARE

## 2018-07-08 DIAGNOSIS — R42 DIZZINESS: ICD-10-CM

## 2018-07-08 DIAGNOSIS — R42 VERTIGO: Primary | ICD-10-CM

## 2018-07-08 LAB
ALBUMIN SERPL-MCNC: 3.5 G/DL (ref 3.2–4.6)
ALBUMIN/GLOB SERPL: 1.2 {RATIO} (ref 1.2–3.5)
ALP SERPL-CCNC: 90 U/L (ref 50–136)
ALT SERPL-CCNC: 19 U/L (ref 12–65)
ANION GAP SERPL CALC-SCNC: 7 MMOL/L (ref 7–16)
AST SERPL-CCNC: 14 U/L (ref 15–37)
BASOPHILS # BLD: 0 K/UL (ref 0–0.2)
BASOPHILS NFR BLD: 0 % (ref 0–2)
BILIRUB SERPL-MCNC: 0.3 MG/DL (ref 0.2–1.1)
BUN SERPL-MCNC: 18 MG/DL (ref 8–23)
CALCIUM SERPL-MCNC: 8.9 MG/DL (ref 8.3–10.4)
CHLORIDE SERPL-SCNC: 103 MMOL/L (ref 98–107)
CO2 SERPL-SCNC: 30 MMOL/L (ref 21–32)
CREAT SERPL-MCNC: 1.27 MG/DL (ref 0.8–1.5)
DIFFERENTIAL METHOD BLD: ABNORMAL
EOSINOPHIL # BLD: 0.2 K/UL (ref 0–0.8)
EOSINOPHIL NFR BLD: 3 % (ref 0.5–7.8)
ERYTHROCYTE [DISTWIDTH] IN BLOOD BY AUTOMATED COUNT: 14.8 % (ref 11.9–14.6)
GLOBULIN SER CALC-MCNC: 2.9 G/DL (ref 2.3–3.5)
GLUCOSE SERPL-MCNC: 122 MG/DL (ref 65–100)
HCT VFR BLD AUTO: 39.8 % (ref 41.1–50.3)
HGB BLD-MCNC: 12.9 G/DL (ref 13.6–17.2)
IMM GRANULOCYTES # BLD: 0 K/UL (ref 0–0.5)
IMM GRANULOCYTES NFR BLD AUTO: 0 % (ref 0–5)
LYMPHOCYTES # BLD: 2.1 K/UL (ref 0.5–4.6)
LYMPHOCYTES NFR BLD: 31 % (ref 13–44)
MCH RBC QN AUTO: 28.7 PG (ref 26.1–32.9)
MCHC RBC AUTO-ENTMCNC: 32.4 G/DL (ref 31.4–35)
MCV RBC AUTO: 88.4 FL (ref 79.6–97.8)
MONOCYTES # BLD: 1 K/UL (ref 0.1–1.3)
MONOCYTES NFR BLD: 15 % (ref 4–12)
NEUTS SEG # BLD: 3.4 K/UL (ref 1.7–8.2)
NEUTS SEG NFR BLD: 51 % (ref 43–78)
PLATELET # BLD AUTO: 225 K/UL (ref 150–450)
PMV BLD AUTO: 9.5 FL (ref 10.8–14.1)
POTASSIUM SERPL-SCNC: 3 MMOL/L (ref 3.5–5.1)
PROT SERPL-MCNC: 6.4 G/DL (ref 6.3–8.2)
RBC # BLD AUTO: 4.5 M/UL (ref 4.23–5.67)
SODIUM SERPL-SCNC: 140 MMOL/L (ref 136–145)
WBC # BLD AUTO: 6.8 K/UL (ref 4.3–11.1)

## 2018-07-08 PROCEDURE — 70450 CT HEAD/BRAIN W/O DYE: CPT

## 2018-07-08 PROCEDURE — 80053 COMPREHEN METABOLIC PANEL: CPT | Performed by: EMERGENCY MEDICINE

## 2018-07-08 PROCEDURE — 93005 ELECTROCARDIOGRAM TRACING: CPT | Performed by: EMERGENCY MEDICINE

## 2018-07-08 PROCEDURE — 85025 COMPLETE CBC W/AUTO DIFF WBC: CPT | Performed by: EMERGENCY MEDICINE

## 2018-07-08 PROCEDURE — 99285 EMERGENCY DEPT VISIT HI MDM: CPT

## 2018-07-08 RX ORDER — MECLIZINE HYDROCHLORIDE 25 MG/1
25 TABLET ORAL
Qty: 20 TAB | Refills: 0 | Status: SHIPPED | OUTPATIENT
Start: 2018-07-08 | End: 2018-10-12 | Stop reason: SDUPTHER

## 2018-07-08 NOTE — Clinical Note
See your primary care physician to adjust any medications you are on several different medications that are overlapping such as baclofen and Flexeril. He sure she is okay with taking them when necessary.

## 2018-07-09 VITALS
HEIGHT: 70 IN | OXYGEN SATURATION: 98 % | TEMPERATURE: 98 F | SYSTOLIC BLOOD PRESSURE: 121 MMHG | HEART RATE: 79 BPM | RESPIRATION RATE: 22 BRPM | WEIGHT: 173 LBS | BODY MASS INDEX: 24.77 KG/M2 | DIASTOLIC BLOOD PRESSURE: 81 MMHG

## 2018-07-09 LAB
ATRIAL RATE: 75 BPM
CALCULATED P AXIS, ECG09: 24 DEGREES
CALCULATED R AXIS, ECG10: 83 DEGREES
CALCULATED T AXIS, ECG11: 57 DEGREES
DIAGNOSIS, 93000: NORMAL
P-R INTERVAL, ECG05: 142 MS
Q-T INTERVAL, ECG07: 360 MS
QRS DURATION, ECG06: 80 MS
QTC CALCULATION (BEZET), ECG08: 402 MS
VENTRICULAR RATE, ECG03: 75 BPM

## 2018-07-09 NOTE — ED PROVIDER NOTES
HPI Comments: 71-year-old male complaining of dizziness. Patient states he's been having problems sleeping and taking trazodone and baclofen. Mary Adair He was recently placed on steroids as well. Last night became more dizzy after taking several doses of baclofen and prednisone. Patient family doctor is out of town so decided to come to the ED. Patient has had problems with insomnia for a while. Patient is a 71 y.o. male presenting with dizziness. The history is provided by the patient. Dizziness   This is a recurrent problem. The current episode started more than 2 days ago. The problem has been gradually worsening. There was no focality noted. Pertinent negatives include no focal weakness, no loss of sensation, no slurred speech, no memory loss, no visual change, no mental status change and no unresponsiveness. There has been no fever. Pertinent negatives include no altered mental status, no confusion, no headaches, no choking and no nausea. Associated medical issues do not include trauma. Past Medical History:   Diagnosis Date    Allergic rhinitis, cause unspecified 3/8/2013    CAD (coronary artery disease) 1/3/2013    MI age 62- no intervention; affirms SOB with 1 flight of steps    Cancer [facility])     Chest pain     Chronic pain     lumbar pain    Depression     Dizziness 3/8/2013    Dyslipidemia     Esophageal stenosis 8/16/2013    dxed on EGD early 2000's. Never dilated    Esophagitis 1/3/2013    GERD with esophagitis     not completely controlled with zantac    HTN (hypertension) 1/3/2013    controlled with med    Kidney stones 1/3/2013    Narcolepsy 1/3/2013    Prostate cancer ([facility] [address] 1/3/2013    Vertigo, benign positional        Past Surgical History:   Procedure Laterality Date    HX CARPAL TUNNEL RELEASE      HX CHOLECYSTECTOMY  2008?     HX LITHOTRIPSY      HX LUMBAR LAMINECTOMY      x6 Dr. Hero Keller HX ORTHOPAEDIC  2004    carpel tunnel/left hand   [address] carpel tunnel/right hand    HX PROSTATECTOMY  2008? Dr. Maxim Lopez      Laminectomy         Family History:   Problem Relation Age of Onset    Depression Mother     Alcohol abuse Father     Depression Sister     Depression Brother     Depression Brother     Depression Sister     Depression Sister        Social History     Social History    Marital status:      Spouse name: N/A    Number of children: N/A    Years of education: N/A     Occupational History    Not on file. Social History Main Topics    Smoking status: Never Smoker    Smokeless tobacco: Never Used    Alcohol use No    Drug use: No    Sexual activity: Not on file     Other Topics Concern     Service Yes     reserves - 8 m 1966    Blood Transfusions No    Caffeine Concern Yes     3 pepsis a day    Special Diet No    Exercise Yes     yard work, does some in the bed for neck   [place] Yes     Social History Narrative         ALLERGIES: Review of patient's allergies indicates no known allergies. Review of Systems   Constitutional: Negative. Negative for activity change. HENT: Negative. Eyes: Negative. Respiratory: Negative. Negative for choking. Cardiovascular: Negative. Gastrointestinal: Negative. Negative for nausea. Genitourinary: Negative. Musculoskeletal: Negative. Skin: Negative. Neurological: Positive for dizziness. Negative for focal weakness and headaches. Psychiatric/Behavioral: Negative. Negative for confusion and memory loss. All other systems reviewed and are negative. Vitals:    07/08/18 2157 07/08/18 2236 07/08/18 2300   BP: 122/81 113/79 106/74   Pulse: 82 74 68   Resp: 17 30 20   Temp: 98 °F (36.7 °C)     SpO2: 97% 97% 93%   Weight: 78.5 kg (173 lb)     Height: 5' 10\" (1.778 m)              Physical Exam   Constitutional: He is oriented to person, place, and time. He appears well-developed and well-nourished. No distress. HENT:   Head: Normocephalic and atraumatic. Right Ear: External ear normal.   Left Ear: External ear normal.   Nose: Nose normal.   Mouth/Throat: Oropharynx is clear and moist. No oropharyngeal exudate. Eyes: Conjunctivae and EOM are normal. Pupils are equal, round, and reactive to light. Right eye exhibits no discharge. Left eye exhibits no discharge. No scleral icterus. Neck: Normal range of motion. Neck supple. No JVD present. No tracheal deviation present. Cardiovascular: Normal rate, regular rhythm and intact distal pulses. Pulmonary/Chest: Effort normal and breath sounds normal. No stridor. No respiratory distress. He has no wheezes. He exhibits no tenderness. Abdominal: Soft. Bowel sounds are normal. He exhibits no distension and no mass. There is no tenderness. Musculoskeletal: Normal range of motion. He exhibits no edema or tenderness. Neurological: He is alert and oriented to person, place, and time. No cranial nerve deficit. Skin: Skin is warm and dry. No rash noted. He is not diaphoretic. No erythema. No pallor. Psychiatric: He has a normal mood and affect. His behavior is normal. Thought content normal.   Nursing note and vitals reviewed. MDM  Number of Diagnoses or Management Options  Diagnosis management comments: No neurologic deficits symptoms are gradual and some more chronic. We will advise to reduce the doses of baclofen he takes. Contact his primary care physician as soon as she returns from her vacation.        Amount and/or Complexity of Data Reviewed  Clinical lab tests: ordered and reviewed  Tests in the radiology section of CPT®: ordered and reviewed  Tests in the medicine section of CPT®: ordered and reviewed          ED Course       Procedures

## 2018-07-09 NOTE — ED TRIAGE NOTES
Pt c/o dizziness, weakness, walking crooked and just not feeling right for 5 days. Pt states he thought it was a medication reaction but he doesn't think so now.

## 2018-07-09 NOTE — ED NOTES
I have reviewed discharge instructions with the patient. The patient verbalized understanding. Patient left ED via Discharge Method: ambulatory to Home with son. Opportunity for questions and clarification provided. Patient given 0 scripts. To continue your aftercare when you leave the hospital, you may receive an automated call from our care team to check in on how you are doing. This is a free service and part of our promise to provide the best care and service to meet your aftercare needs.  If you have questions, or wish to unsubscribe from this service please call [phone]. Thank you for Choosing our [facility].

## 2018-07-09 NOTE — DISCHARGE INSTRUCTIONS
Dizziness: Care Instructions  Your Care Instructions  Dizziness is the feeling of unsteadiness or fuzziness in your head. It is different than having vertigo, which is a feeling that the room is spinning or that you are moving or falling. It is also different from lightheadedness, which is the feeling that you are about to faint. It can be hard to know what causes dizziness. Some people feel dizzy when they have migraine headaches. Sometimes bouts of flu can make you feel dizzy. Some medical conditions, such as heart problems or high blood pressure, can make you feel dizzy. Many medicines can cause dizziness, including medicines for high blood pressure, pain, or anxiety. If a medicine causes your symptoms, your doctor may recommend that you stop or change the medicine. If it is a problem with your heart, you may need medicine to help your heart work better. If there is no clear reason for your symptoms, your doctor may suggest watching and waiting for a while to see if the dizziness goes away on its own. Follow-up care is a key part of your treatment and safety. Be sure to make and go to all appointments, and call your doctor if you are having problems. It's also a good idea to know your test results and keep a list of the medicines you take. How can you care for yourself at home? · If your doctor recommends or prescribes medicine, take it exactly as directed. Call your doctor if you think you are having a problem with your medicine. · Do not drive while you feel dizzy. · Try to prevent falls. Steps you can take include:  ¨ Using nonskid mats, adding grab bars near the tub, and using night-lights. ¨ Clearing your home so that walkways are free of anything you might trip on. ¨ Letting family and friends know that you have been feeling dizzy. This will help them know how to help you. When should you call for help? Call 911 anytime you think you may need emergency care.  For example, call if:  ? · You passed out (lost consciousness). ? · You have dizziness along with symptoms of a heart attack. These may include:  ¨ Chest pain or pressure, or a strange feeling in the chest.  ¨ Sweating. ¨ Shortness of breath. ¨ Nausea or vomiting. ¨ Pain, pressure, or a strange feeling in the back, neck, jaw, or upper belly or in one or both shoulders or arms. ¨ Lightheadedness or sudden weakness. ¨ A fast or irregular heartbeat. ? · You have symptoms of a stroke. These may include:  ¨ Sudden numbness, tingling, weakness, or loss of movement in your face, arm, or leg, especially on only one side of your body. ¨ Sudden vision changes. ¨ Sudden trouble speaking. ¨ Sudden confusion or trouble understanding simple statements. ¨ Sudden problems with walking or balance. ¨ A sudden, severe headache that is different from past headaches. ?Call your doctor now or seek immediate medical care if:  ? · You feel dizzy and have a fever, headache, or ringing in your ears. ? · You have new or increased nausea and vomiting. ? · Your dizziness does not go away or comes back. ? Watch closely for changes in your health, and be sure to contact your doctor if:  ? · You do not get better as expected. Where can you learn more? Go to http://oracio-gage.info/. Enter T831 in the search box to learn more about \"Dizziness: Care Instructions. \"  Current as of: March 20, 2017  Content Version: 11.4  © 4198-0637 [employer]. Care instructions adapted under license by [facility] (which disclaims liability or warranty for this information). If you have questions about a medical condition or this instruction, always ask your healthcare professional. [place] [address] any warranty or liability for your use of this information.

## 2018-09-17 ENCOUNTER — HOSPITAL ENCOUNTER (EMERGENCY)
Age: 73
Discharge: HOME | End: 2018-09-17
Attending: EMERGENCY MEDICINE
Payer: MEDICARE

## 2018-09-17 VITALS
BODY MASS INDEX: 25.05 KG/M2 | DIASTOLIC BLOOD PRESSURE: 75 MMHG | OXYGEN SATURATION: 93 % | SYSTOLIC BLOOD PRESSURE: 144 MMHG | HEART RATE: 90 BPM | TEMPERATURE: 98.2 F | WEIGHT: 175 LBS | RESPIRATION RATE: 20 BRPM | HEIGHT: 70 IN

## 2018-09-17 DIAGNOSIS — M62.838 MUSCLE SPASM: ICD-10-CM

## 2018-09-17 DIAGNOSIS — G89.29 OTHER CHRONIC PAIN: ICD-10-CM

## 2018-09-17 DIAGNOSIS — S39.012A BACK STRAIN, INITIAL ENCOUNTER: Primary | ICD-10-CM

## 2018-09-17 LAB
BACTERIA URNS QL MICRO: 0 /HPF
CASTS URNS QL MICRO: 0 /LPF
EPI CELLS #/AREA URNS HPF: 0 /HPF
RBC #/AREA URNS HPF: NORMAL /HPF
WBC URNS QL MICRO: 0 /HPF

## 2018-09-17 PROCEDURE — 81003 URINALYSIS AUTO W/O SCOPE: CPT | Performed by: EMERGENCY MEDICINE

## 2018-09-17 PROCEDURE — 81015 MICROSCOPIC EXAM OF URINE: CPT

## 2018-09-17 PROCEDURE — 74011250636 HC RX REV CODE- 250/636: Performed by: EMERGENCY MEDICINE

## 2018-09-17 PROCEDURE — 99284 EMERGENCY DEPT VISIT MOD MDM: CPT | Performed by: EMERGENCY MEDICINE

## 2018-09-17 PROCEDURE — 96372 THER/PROPH/DIAG INJ SC/IM: CPT | Performed by: EMERGENCY MEDICINE

## 2018-09-17 RX ORDER — KETOROLAC TROMETHAMINE 30 MG/ML
15 INJECTION, SOLUTION INTRAMUSCULAR; INTRAVENOUS
Status: COMPLETED | OUTPATIENT
Start: 2018-09-17 | End: 2018-09-17

## 2018-09-17 RX ORDER — DEXAMETHASONE SODIUM PHOSPHATE 100 MG/10ML
10 INJECTION INTRAMUSCULAR; INTRAVENOUS
Status: COMPLETED | OUTPATIENT
Start: 2018-09-17 | End: 2018-09-17

## 2018-09-17 RX ORDER — CYCLOBENZAPRINE HCL 10 MG
10 TABLET ORAL
Qty: 15 TAB | Refills: 0 | Status: SHIPPED | OUTPATIENT
Start: 2018-09-17 | End: 2019-01-11 | Stop reason: SDUPTHER

## 2018-09-17 RX ADMIN — KETOROLAC TROMETHAMINE 15 MG: 30 INJECTION, SOLUTION INTRAMUSCULAR at 11:32

## 2018-09-17 RX ADMIN — DEXAMETHASONE SODIUM PHOSPHATE 10 MG: 10 INJECTION INTRAMUSCULAR; INTRAVENOUS at 11:32

## 2018-09-17 NOTE — DISCHARGE INSTRUCTIONS

## 2018-09-17 NOTE — ED TRIAGE NOTES
PMD-khoi Stephens. Pt c/o back pain x 7 days. Described as feeling bruised. Denies injury. Hx of kidney stones. Denies hematuria. \"Lines\" in field of vision x 3 days. Last eye exam was 1 year ago. Drove himself here. Denies difficulty with speech. Pt is alert and oriented. States that he has taken a 7.5 mg Lortab without relief.

## 2018-09-17 NOTE — ED NOTES
I have reviewed discharge instructions with the patient. The patient verbalized understanding. Patient left ED via Discharge Method: ambulatory to Home. Opportunity for questions and clarification provided. Patient given 1 scripts. To continue your aftercare when you leave the hospital, you may receive an automated call from our care team to check in on how you are doing. This is a free service and part of our promise to provide the best care and service to meet your aftercare needs.  If you have questions, or wish to unsubscribe from this service please call [phone]. Thank you for Choosing our [facility].

## 2018-09-17 NOTE — ED PROVIDER NOTES
HPI Comments: Moises Bassett for chronic back and neck pain. PCP is Dr. Joanie Klein. Patient denies visual changes other than tearing from pain and muscle spasms. Patient is a 68 y.o. male presenting with back pain. The history is provided by the patient. Back Pain This is a chronic problem. The current episode started more than 2 days ago. The problem has been gradually worsening. The problem occurs constantly. The pain is associated with no known injury. The pain is present in the lumbar spine and left side. The quality of the pain is described as sharp. The pain is moderate. The symptoms are aggravated by certain positions, twisting and bending. Associated symptoms include weakness (chronic foot drop on the right). Pertinent negatives include no chest pain, no fever, no weight loss, no abdominal pain, no perianal numbness, no dysuria, no paresthesias, no paresis and no tingling. He has tried analgesics for the symptoms. The treatment provided no relief. Past Medical History:  
Diagnosis Date  Allergic rhinitis, cause unspecified 3/8/2013  CAD (coronary artery disease) 1/3/2013 MI age 62- no intervention; affirms SOB with 1 flight of steps  Cancer ([address]  Chest pain  Chronic pain   
 lumbar pain  Depression  Dizziness 3/8/2013  Dyslipidemia  Esophageal stenosis 8/16/2013  
 dxed on EGD early 2000's. Never dilated  Esophagitis 1/3/2013  GERD with esophagitis   
 not completely controlled with zantac  
 HTN (hypertension) 1/3/2013  
 controlled with med  Kidney stones 1/3/2013  Narcolepsy 1/3/2013  Prostate cancer ([address] 1/3/2013  Vertigo, benign positional   
 
 
Past Surgical History:  
Procedure Laterality Date  HX CARPAL TUNNEL RELEASE  HX CHOLECYSTECTOMY  2008?  HX LITHOTRIPSY  HX LUMBAR LAMINECTOMY x6 Dr. Tena East  HX ORTHOPAEDIC  2004  
 carpel tunnel/left hand  HX ORTHOPAEDIC  1995  
 carpel tunnel/right hand  HX PROSTATECTOMY  2008? Dr. Barcenas Lav  NEUROLOGICAL PROCEDURE UNLISTED Laminectomy Family History:  
Problem Relation Age of Onset  Depression Mother  Alcohol abuse Father  Depression Sister  Depression Brother  Depression Brother  Depression Sister  Depression Sister Social History Social History  Marital status:  Spouse name: N/A  
 Number of children: N/A  
 Years of education: N/A Occupational History  Not on file. Social History Main Topics  Smoking status: Never Smoker  Smokeless tobacco: Never Used  Alcohol use No  
 Drug use: No  
 Sexual activity: Not on file Other Topics Concern [address] Yes  
  reserves - [address]  Blood Transfusions No  
 Caffeine Concern Yes 3 pepsis a day  Special Diet No  
 Exercise Yes  
  yard work, does some in the bed for neck [place] Yes Social History Narrative ALLERGIES: Review of patient's allergies indicates no known allergies. Review of Systems Constitutional: Negative for fever and weight loss. Eyes: Negative for pain, redness and visual disturbance. Cardiovascular: Negative for chest pain. Gastrointestinal: Negative for abdominal pain, nausea and vomiting. Endocrine: Negative for polydipsia and polyuria. Genitourinary: Negative for dysuria, frequency and urgency. Musculoskeletal: Positive for back pain. Neurological: Positive for weakness (chronic foot drop on the right). Negative for tingling and paresthesias. Vitals:  
 09/17/18 1043 BP: 118/69 Pulse: 81 Resp: 18 Temp: 97.8 °F (36.6 °C) SpO2: 96% Weight: 79.4 kg (175 lb) Height: 5' 10\" (1.778 m) Physical Exam  
Constitutional: He appears well-developed and well-nourished. Neck: Neck supple. Cardiovascular: Normal rate, regular rhythm and normal heart sounds.    
Pulmonary/Chest: Effort normal and breath sounds normal.  
 Abdominal: Soft. He exhibits no distension. There is no tenderness. There is no rebound and no guarding. Musculoskeletal: He exhibits tenderness (tenderness in theleft upper lumbar and lower thoracic paraspinous muscles. Movement causes spasms of pain. ). Lymphadenopathy:  
  He has no cervical adenopathy. Neurological: He has normal strength. No sensory deficit. Reflex Scores: 
     Patellar reflexes are 2+ on the right side and 1+ on the left side. Strength normal other than a chronic right foot drop Nursing note and vitals reviewed. MDM Number of Diagnoses or Management Options Diagnosis management comments: No midline lumbar or thoracic tenderness. Spasms of pain noted with certain movements and positions. Pain does not seem consistent with renal colic. Urine dip positive for trace blood and urine micro-pending. No pulsatile abdominal mass. Amount and/or Complexity of Data Reviewed Clinical lab tests: ordered and reviewed (Results for orders placed or performed during the hospital encounter of 09/17/18 
-URINE MICROSCOPIC Result                                            Value                         Ref Range WBC                                               0                             0 /hpf                    
     RBC                                               0-3                           0 /hpf Epithelial cells                                  0                             0 /hpf Bacteria                                          0                             0 /hpf Casts                                             0                             0 /lpf                    
) 
Tests in the radiology section of CPT®: ordered and reviewed ED Course Procedures

## 2018-10-12 PROBLEM — R13.14 PHARYNGOESOPHAGEAL DYSPHAGIA: Status: ACTIVE | Noted: 2018-10-12

## 2019-02-17 ENCOUNTER — APPOINTMENT (OUTPATIENT)
Dept: GENERAL RADIOLOGY | Age: 74
End: 2019-02-17
Attending: EMERGENCY MEDICINE
Payer: MEDICARE

## 2019-02-17 ENCOUNTER — APPOINTMENT (OUTPATIENT)
Dept: CT IMAGING | Age: 74
End: 2019-02-17
Attending: EMERGENCY MEDICINE
Payer: MEDICARE

## 2019-02-17 ENCOUNTER — HOSPITAL ENCOUNTER (EMERGENCY)
Age: 74
Discharge: HOME | End: 2019-02-17
Attending: EMERGENCY MEDICINE
Payer: MEDICARE

## 2019-02-17 VITALS
BODY MASS INDEX: 25.05 KG/M2 | SYSTOLIC BLOOD PRESSURE: 131 MMHG | DIASTOLIC BLOOD PRESSURE: 81 MMHG | HEIGHT: 70 IN | TEMPERATURE: 98.2 F | OXYGEN SATURATION: 97 % | HEART RATE: 80 BPM | WEIGHT: 175 LBS | RESPIRATION RATE: 18 BRPM

## 2019-02-17 DIAGNOSIS — G89.29 CHRONIC RIGHT-SIDED LOW BACK PAIN WITHOUT SCIATICA: ICD-10-CM

## 2019-02-17 DIAGNOSIS — M54.50 CHRONIC RIGHT-SIDED LOW BACK PAIN WITHOUT SCIATICA: ICD-10-CM

## 2019-02-17 DIAGNOSIS — M54.2 NECK PAIN: Primary | ICD-10-CM

## 2019-02-17 LAB
ALBUMIN SERPL-MCNC: 3.7 G/DL (ref 3.2–4.6)
ALBUMIN/GLOB SERPL: 1 {RATIO}
ALP SERPL-CCNC: 87 U/L (ref 50–136)
ALT SERPL-CCNC: 17 U/L (ref 12–65)
ANION GAP SERPL CALC-SCNC: 7 MMOL/L
AST SERPL-CCNC: 17 U/L (ref 15–37)
BASOPHILS # BLD: 0.1 K/UL (ref 0–0.2)
BASOPHILS NFR BLD: 1 % (ref 0–2)
BILIRUB SERPL-MCNC: 0.8 MG/DL (ref 0.2–1.1)
BUN SERPL-MCNC: 14 MG/DL (ref 8–23)
CALCIUM SERPL-MCNC: 8.9 MG/DL (ref 8.3–10.4)
CHLORIDE SERPL-SCNC: 101 MMOL/L (ref 98–107)
CO2 SERPL-SCNC: 30 MMOL/L (ref 21–32)
CREAT SERPL-MCNC: 1.15 MG/DL (ref 0.8–1.5)
DIFFERENTIAL METHOD BLD: ABNORMAL
EOSINOPHIL # BLD: 0.1 K/UL (ref 0–0.8)
EOSINOPHIL NFR BLD: 1 % (ref 0.5–7.8)
ERYTHROCYTE [DISTWIDTH] IN BLOOD BY AUTOMATED COUNT: 13.5 % (ref 11.9–14.6)
GLOBULIN SER CALC-MCNC: 3.6 G/DL (ref 2.3–3.5)
GLUCOSE SERPL-MCNC: 88 MG/DL (ref 65–100)
HCT VFR BLD AUTO: 40.7 % (ref 41.1–50.3)
HGB BLD-MCNC: 13.3 G/DL (ref 13.6–17.2)
IMM GRANULOCYTES # BLD AUTO: 0 K/UL (ref 0–0.5)
IMM GRANULOCYTES NFR BLD AUTO: 0 % (ref 0–5)
LYMPHOCYTES # BLD: 1.4 K/UL (ref 0.5–4.6)
LYMPHOCYTES NFR BLD: 18 % (ref 13–44)
MCH RBC QN AUTO: 29.4 PG (ref 26.1–32.9)
MCHC RBC AUTO-ENTMCNC: 32.7 G/DL (ref 31.4–35)
MCV RBC AUTO: 89.8 FL (ref 79.6–97.8)
MONOCYTES # BLD: 1 K/UL (ref 0.1–1.3)
MONOCYTES NFR BLD: 13 % (ref 4–12)
NEUTS SEG # BLD: 5.5 K/UL (ref 1.7–8.2)
NEUTS SEG NFR BLD: 67 % (ref 43–78)
NRBC # BLD: 0 K/UL (ref 0–0.2)
PLATELET # BLD AUTO: 233 K/UL (ref 150–450)
PMV BLD AUTO: 9.2 FL (ref 9.4–12.3)
POTASSIUM SERPL-SCNC: 3.2 MMOL/L (ref 3.5–5.1)
PROT SERPL-MCNC: 7.3 G/DL
RBC # BLD AUTO: 4.53 M/UL (ref 4.23–5.6)
SODIUM SERPL-SCNC: 138 MMOL/L (ref 136–145)
WBC # BLD AUTO: 8.1 K/UL (ref 4.3–11.1)

## 2019-02-17 PROCEDURE — 99284 EMERGENCY DEPT VISIT MOD MDM: CPT | Performed by: EMERGENCY MEDICINE

## 2019-02-17 PROCEDURE — 85025 COMPLETE CBC W/AUTO DIFF WBC: CPT

## 2019-02-17 PROCEDURE — 72100 X-RAY EXAM L-S SPINE 2/3 VWS: CPT

## 2019-02-17 PROCEDURE — 81003 URINALYSIS AUTO W/O SCOPE: CPT | Performed by: EMERGENCY MEDICINE

## 2019-02-17 PROCEDURE — 96372 THER/PROPH/DIAG INJ SC/IM: CPT | Performed by: EMERGENCY MEDICINE

## 2019-02-17 PROCEDURE — 80053 COMPREHEN METABOLIC PANEL: CPT

## 2019-02-17 PROCEDURE — 74011250636 HC RX REV CODE- 250/636: Performed by: EMERGENCY MEDICINE

## 2019-02-17 PROCEDURE — 72125 CT NECK SPINE W/O DYE: CPT

## 2019-02-17 RX ORDER — DICLOFENAC POTASSIUM 50 MG/1
50 TABLET, FILM COATED ORAL 3 TIMES DAILY
Qty: 15 TAB | Refills: 0 | Status: SHIPPED | OUTPATIENT
Start: 2019-02-17 | End: 2019-05-09

## 2019-02-17 RX ORDER — KETOROLAC TROMETHAMINE 30 MG/ML
30 INJECTION, SOLUTION INTRAMUSCULAR; INTRAVENOUS
Status: COMPLETED | OUTPATIENT
Start: 2019-02-17 | End: 2019-02-17

## 2019-02-17 RX ADMIN — KETOROLAC TROMETHAMINE 30 MG: 30 INJECTION, SOLUTION INTRAMUSCULAR at 11:05

## 2019-02-17 NOTE — ED TRIAGE NOTES
Patient complaining of multiple falls over past week. Patient advises that last fall was 2/15/19. Patient advises he has right foot drop and that he has been tripping on it more than normal and he went to stand up once and was not aware that his foot had got numb sitting there and fell when he attempted to stand. Patient complaining of neck and back pain at this time.

## 2019-02-17 NOTE — ED NOTES
I have reviewed discharge instructions with the patient. The patient verbalized understanding. Patient left ED via Discharge Method: ambulatory to Home with (insert name of family/friend, self, transport POV). Opportunity for questions and clarification provided. Patient given 1 scripts. Catrash[place]am 
 
 
 
To continue your aftercare when you leave the hospital, you may receive an automated call from our care team to check in on how you are doing. This is a free service and part of our promise to provide the best care and service to meet your aftercare needs.  If you have questions, or wish to unsubscribe from this service please call [phone]. Thank you for Choosing our [facility].

## 2019-02-17 NOTE — ED PROVIDER NOTES
Patient with 6 falls over the past week and a half. Has a right drop foot and left drop great toe. Is supposed to wear leg braces, but does not wear them all the time, around the house. His post using a cane but does not use it around the house. Last fall was yesterday or the day before. With these falls for the past 4 days. Has complaints of neck pain and right lower back pain. No saddle anesthesia or change in bowel or bladder function. No numbness or weakness. No headache or blurred vision. No chest pain or shortness of breath. The history is provided by the patient. No  was used. Fall The accident occurred yesterday. The fall occurred while walking. He fell from a height of ground level. He landed on hard floor. There was no blood loss. Point of impact: non specefic. The pain is present in the neck (right lower back). The pain is mild. He was ambulatory at the scene. There was no entrapment after the fall. Pertinent negatives include no visual change, no fever, no numbness, no abdominal pain, no bowel incontinence, no nausea, no vomiting, no hematuria, no headaches, no extremity weakness, no loss of consciousness, no tingling and no laceration. The risk factors include being elderly and recurrent falls. The symptoms are aggravated by pressure on injury. He has tried nothing for the symptoms. Past Medical History:  
Diagnosis Date  Allergic rhinitis, cause unspecified 3/8/2013  CAD (coronary artery disease) 1/3/2013 MI age 62- no intervention; affirms SOB with 1 flight of steps  Cancer ([facility]  Chest pain  Chronic pain   
 lumbar pain  Depression  Dizziness 3/8/2013  Dyslipidemia  Esophageal stenosis 8/16/2013  
 dxed on EGD early 2000's. Never dilated  Esophagitis 1/3/2013  GERD with esophagitis   
 not completely controlled with zantac  
 HTN (hypertension) 1/3/2013  
 controlled with med  Kidney stones 1/3/2013  Narcolepsy 1/3/2013  Prostate cancer ([facility] [address] 1/3/2013  Vertigo, benign positional   
 
 
Past Surgical History:  
Procedure Laterality Date  HX CARPAL TUNNEL RELEASE  HX CHOLECYSTECTOMY  2008?  HX LITHOTRIPSY  HX LUMBAR LAMINECTOMY x6 Dr. Stuart Garcia  HX ORTHOPAEDIC  2004  
 carpel tunnel/left hand  HX ORTHOPAEDIC  1995  
 carpel tunnel/right hand  HX PROSTATECTOMY  2008? Dr. Azucena Xiong  NEUROLOGICAL PROCEDURE UNLISTED Laminectomy Family History:  
Problem Relation Age of Onset  Depression Mother  Alcohol abuse Father  Depression Sister  Depression Brother  Depression Brother  Depression Sister  Depression Sister Social History Socioeconomic History  Marital status:  Spouse name: Not on file  Number of children: Not on file  Years of education: Not on file  Highest education level: Not on file Social Needs  Financial resource strain: Not on file  Food insecurity - worry: Not on file  Food insecurity - inability: Not on file  Transportation needs - medical: Not on file  Transportation needs - non-medical: Not on file Occupational History  Not on file Tobacco Use  Smoking status: Never Smoker  Smokeless tobacco: Never Used Substance and Sexual Activity  Alcohol use: No  
 Drug use: No  
 Sexual activity: Not on file Other Topics Concern [address] Yes Comment: reserves - 8 m 1965  Blood Transfusions No  
 Caffeine Concern Yes Comment: 3 pepsis a day  Occupational Exposure Not Asked Jennifer Thomas Hazards Not Asked  Sleep Concern Not Asked  Stress Concern Not Asked  Weight Concern Not Asked  Special Diet No  
 Back Care Not Asked  Exercise Yes Comment: yard work, does some in the bed for neck  Bike Helmet Not Asked [address] Yes  Self-Exams Not Asked Social History Narrative  Not on file ALLERGIES: Patient has no known allergies. Review of Systems Constitutional: Negative for chills and fever. HENT: Negative for rhinorrhea and sore throat. Eyes: Negative for pain and redness. Respiratory: Negative for chest tightness, shortness of breath and wheezing. Cardiovascular: Negative for chest pain and leg swelling. Gastrointestinal: Negative for abdominal pain, bowel incontinence, diarrhea, nausea and vomiting. Genitourinary: Negative for dysuria and hematuria. Musculoskeletal: Positive for back pain and neck pain. Negative for extremity weakness, gait problem and neck stiffness. Skin: Negative for color change and rash. Neurological: Negative for tingling, loss of consciousness, weakness, numbness and headaches. Vitals:  
 02/17/19 1037 BP: 127/82 Pulse: 80 Resp: 18 Temp: 98.2 °F (36.8 °C) SpO2: 97% Weight: 79.4 kg (175 lb) Height: 5' 10\" (1.778 m) Physical Exam  
Constitutional: He is oriented to person, place, and time. He appears well-developed and well-nourished. HENT:  
Head: Normocephalic and atraumatic. Neck: Normal range of motion. Neck supple. Pain with movement of neck side to side. No midline TTP. Cardiovascular: Normal rate and regular rhythm. Pulmonary/Chest: Effort normal and breath sounds normal.  
Abdominal: Soft. Bowel sounds are normal. There is no tenderness. Musculoskeletal: Normal range of motion. He exhibits tenderness (mild right lower back TTP. ). He exhibits no edema. Neurological: He is alert and oriented to person, place, and time. Skin: Skin is warm and dry. No laceration noted. MDM Number of Diagnoses or Management Options Diagnosis management comments: No acute on CT and xray. Known chronic DJD. Will discharge on lortab already. Amount and/or Complexity of Data Reviewed Clinical lab tests: ordered and reviewed Tests in the radiology section of CPT®: ordered and reviewed Tests in the medicine section of CPT®: ordered and reviewed Patient Progress Patient progress: stable Procedures Results Include: 
 
Recent Results (from the past 24 hour(s)) CBC WITH AUTOMATED DIFF Collection Time: 02/17/19 12:21 PM  
Result Value Ref Range WBC 8.1 4.3 - 11.1 K/uL  
 RBC 4.53 4.23 - 5.6 M/uL  
 HGB 13.3 (L) 13.6 - 17.2 g/dL HCT 40.7 (L) 41.1 - 50.3 % MCV 89.8 79.6 - 97.8 FL  
 MCH 29.4 26.1 - 32.9 PG  
 MCHC 32.7 31.4 - 35.0 g/dL  
 RDW 13.5 11.9 - 14.6 % PLATELET 447 818 - 278 K/uL MPV 9.2 (L) 9.4 - 12.3 FL ABSOLUTE NRBC 0.00 0.0 - 0.2 K/uL  
 DF AUTOMATED NEUTROPHILS 67 43 - 78 % LYMPHOCYTES 18 13 - 44 % MONOCYTES 13 (H) 4.0 - 12.0 % EOSINOPHILS 1 0.5 - 7.8 % BASOPHILS 1 0.0 - 2.0 % IMMATURE GRANULOCYTES 0 0.0 - 5.0 %  
 ABS. NEUTROPHILS 5.5 1.7 - 8.2 K/UL  
 ABS. LYMPHOCYTES 1.4 0.5 - 4.6 K/UL  
 ABS. MONOCYTES 1.0 0.1 - 1.3 K/UL  
 ABS. EOSINOPHILS 0.1 0.0 - 0.8 K/UL  
 ABS. BASOPHILS 0.1 0.0 - 0.2 K/UL  
 ABS. IMM. GRANS. 0.0 0.0 - 0.5 K/UL METABOLIC PANEL, COMPREHENSIVE Collection Time: 02/17/19 12:21 PM  
Result Value Ref Range Sodium 138 136 - 145 mmol/L Potassium 3.2 (L) 3.5 - 5.1 mmol/L Chloride 101 98 - 107 mmol/L  
 CO2 30 21 - 32 mmol/L Anion gap 7 mmol/L Glucose 88 65 - 100 mg/dL BUN 14 8 - 23 MG/DL Creatinine 1.15 0.8 - 1.5 MG/DL  
 GFR est AA >60 >60 ml/min/1.73m2 GFR est non-AA >60 ml/min/1.73m2 Calcium 8.9 8.3 - 10.4 MG/DL Bilirubin, total 0.8 0.2 - 1.1 MG/DL  
 ALT (SGPT) 17 12 - 65 U/L  
 AST (SGOT) 17 15 - 37 U/L Alk. phosphatase 87 50 - 136 U/L Protein, total 7.3 g/dL Albumin 3.7 3.2 - 4.6 g/dL Globulin 3.6 (H) 2.3 - 3.5 g/dL A-G Ratio 1.0    
 
CT SPINE CERV WO CONT (Final result) Result time 02/17/19 11:54:20 Final result by Bia Covington MD (02/17/19 11:54:20) Impression:  
 IMPRESSION: 
1. No acute osseous abnormality. 2. Chronic and degenerative changes. 
   
  
  
  
Narrative:  
 CT of the Cervical Spine INDICATION:  Acute neck pain, right lower extremity weakness, multiple falls 
this week COMPARISON: CT head 7/8/2018 and thoracic spine radiograph 8/9/2015 correlation TECHNIQUE: Automated exposure Control was used. Multiple axial images were 
obtained through the cervical spine without intravenous contrast. Coronal and 
sagittal reformatted images were also reviewed for further evaluation of osseous 
structures. FINDINGS: There is no evidence of fracture or subluxation.  No focal suspicious 
osseous lesions are seen. There is mild to moderate multilevel degenerative disc 
space narrowing with associated facet arthropathy, osteophytes, and endplate 
degenerative changes. The usual cervical lordosis is straightened and partially 
reversed which can be due to muscle spasm or patient positioning. There is no 
prevertebral soft tissue swelling.   
 
There are multiple mildly prominent neck lymph nodes which are nonspecific and 
could be reactive, relatively symmetric and shotty in appearance. The partially 
visualized upper lungs are limited by motion demonstrating no obvious mass, 
consolidation or pneumothorax. There are scattered calcifications of the aortic 
arch and the aorta appears ectatic as partially seen. 
  
  
  
  
   
   
XR SPINE LUMB 2 OR 3 V (Final result) Result time 02/17/19 11:20:03 Final result by Olimpia Murray MD (02/17/19 11:20:03) Impression:  
 IMPRESSION:  
1. No evidence of acute fracture or dislocation. 2. Chronic degenerative and surgical changes. Narrative:  
 Lumbar spine Clinical indication: Moderate midline low back pain after a fall injury, patient 
reports multiple previous spine surgeries Comparison: Radiography February 10, 2017 and MRI 3/9/2017 Technique: 3 views Findings: Again demonstrated are multilevel surgical changes compatible with 
discectomy, fusion, and posterior fixation hardware. Overall these appear 
similar in positioning since prior. Slight surrounding lucency is not definitely 
changed and could be postoperative. There is no evidence of an acute fracture, 
dislocation, or developing osseous erosion.  There is chronic disc space 
narrowing at L5-S1, mild to moderate, which may have slightly worsened in the 
interval. Extensive multilevel degenerative changes elsewhere are overall 
similar to prior.

## 2019-02-17 NOTE — DISCHARGE INSTRUCTIONS
Patient Education        Back Pain: Care Instructions  Your Care Instructions    Back pain has many possible causes. It is often related to problems with muscles and ligaments of the back. It may also be related to problems with the nerves, discs, or bones of the back. Moving, lifting, standing, sitting, or sleeping in an awkward way can strain the back. Sometimes you don't notice the injury until later. Arthritis is another common cause of back pain. Although it may hurt a lot, back pain usually improves on its own within several weeks. Most people recover in 12 weeks or less. Using good home treatment and being careful not to stress your back can help you feel better sooner. Follow-up care is a key part of your treatment and safety. Be sure to make and go to all appointments, and call your doctor if you are having problems. It's also a good idea to know your test results and keep a list of the medicines you take. How can you care for yourself at home? · Sit or lie in positions that are most comfortable and reduce your pain. Try one of these positions when you lie down:  ? Lie on your back with your knees bent and supported by large pillows. ? Lie on the floor with your legs on the seat of a sofa or chair. ? Lie on your side with your knees and hips bent and a pillow between your legs. ? Lie on your stomach if it does not make pain worse. · Do not sit up in bed, and avoid soft couches and twisted positions. Bed rest can help relieve pain at first, but it delays healing. Avoid bed rest after the first day of back pain. · Change positions every 30 minutes. If you must sit for long periods of time, take breaks from sitting. Get up and walk around, or lie in a comfortable position. · Try using a heating pad on a low or medium setting for 15 to 20 minutes every 2 or 3 hours. Try a warm shower in place of one session with the heating pad. · You can also try an ice pack for 10 to 15 minutes every 2 to 3 hours. Put a thin cloth between the ice pack and your skin. · Take pain medicines exactly as directed. ? If the doctor gave you a prescription medicine for pain, take it as prescribed. ? If you are not taking a prescription pain medicine, ask your doctor if you can take an over-the-counter medicine. · Take short walks several times a day. You can start with 5 to 10 minutes, 3 or 4 times a day, and work up to longer walks. Walk on level surfaces and avoid hills and stairs until your back is better. · Return to work and other activities as soon as you can. Continued rest without activity is usually not good for your back. · To prevent future back pain, do exercises to stretch and strengthen your back and stomach. Learn how to use good posture, safe lifting techniques, and proper body mechanics. When should you call for help? Call your doctor now or seek immediate medical care if:    · You have new or worsening numbness in your legs.     · You have new or worsening weakness in your legs. (This could make it hard to stand up.)     · You lose control of your bladder or bowels.    Watch closely for changes in your health, and be sure to contact your doctor if:    · You have a fever, lose weight, or don't feel well.     · You do not get better as expected. Where can you learn more? Go to http://oracio-gage.info/. Enter E771 in the search box to learn more about \"Back Pain: Care Instructions. \"  Current as of: September 20, 2018  Content Version: 11.9  © 3594-9683 [employer]. Care instructions adapted under license by [employer] (which disclaims liability or warranty for this information). If you have questions about a medical condition or this instruction, always ask your healthcare professional. [place] [address] any warranty or liability for your use of this information.          Patient Education        Neck Pain: Care Instructions  Your Care Instructions    You can have neck pain anywhere from the bottom of your head to the top of your shoulders. It can spread to the upper back or arms. Injuries, painting a ceiling, sleeping with your neck twisted, staying in one position for too long, and many other activities can cause neck pain. Most neck pain gets better with home care. Your doctor may recommend medicine to relieve pain or relax your muscles. He or she may suggest exercise and physical therapy to increase flexibility and relieve stress. You may need to wear a special (cervical) collar to support your neck for a day or two. Follow-up care is a key part of your treatment and safety. Be sure to make and go to all appointments, and call your doctor if you are having problems. It's also a good idea to know your test results and keep a list of the medicines you take. How can you care for yourself at home? · Try using a heating pad on a low or medium setting for 15 to 20 minutes every 2 or 3 hours. Try a warm shower in place of one session with the heating pad. · You can also try an ice pack for 10 to 15 minutes every 2 to 3 hours. Put a thin cloth between the ice and your skin. · Take pain medicines exactly as directed. ? If the doctor gave you a prescription medicine for pain, take it as prescribed. ? If you are not taking a prescription pain medicine, ask your doctor if you can take an over-the-counter medicine. · If your doctor recommends a cervical collar, wear it exactly as directed. When should you call for help? Call your doctor now or seek immediate medical care if:    · You have new or worsening numbness in your arms, buttocks or legs.     · You have new or worsening weakness in your arms or legs.  (This could make it hard to stand up.)     · You lose control of your bladder or bowels.    Watch closely for changes in your health, and be sure to contact your doctor if:    · Your neck pain is getting worse.     · You are not getting better after 1 week.     · You do not get better as expected. Where can you learn more? Go to http://oracio-gage.info/. Enter [id number] in the search box to learn more about \"Neck Pain: Care Instructions. \"  Current as of: September 20, 2018  Content Version: 11.9  © 3522-9578 [employer]. Care instructions adapted under license by [employer] (which disclaims liability or warranty for this information). If you have questions about a medical condition or this instruction, always ask your healthcare professional. [address] any warranty or liability for your use of this information.

## 2019-03-23 ENCOUNTER — APPOINTMENT (OUTPATIENT)
Dept: GENERAL RADIOLOGY | Age: 74
End: 2019-03-23
Attending: EMERGENCY MEDICINE
Payer: MEDICARE

## 2019-03-23 ENCOUNTER — HOSPITAL ENCOUNTER (EMERGENCY)
Age: 74
Discharge: HOME | End: 2019-03-23
Attending: EMERGENCY MEDICINE | Admitting: EMERGENCY MEDICINE
Payer: MEDICARE

## 2019-03-23 VITALS
OXYGEN SATURATION: 95 % | HEIGHT: 70 IN | BODY MASS INDEX: 25.05 KG/M2 | WEIGHT: 175 LBS | SYSTOLIC BLOOD PRESSURE: 140 MMHG | DIASTOLIC BLOOD PRESSURE: 87 MMHG | TEMPERATURE: 97.4 F | HEART RATE: 78 BPM | RESPIRATION RATE: 20 BRPM

## 2019-03-23 DIAGNOSIS — J01.90 ACUTE NON-RECURRENT SINUSITIS, UNSPECIFIED LOCATION: Primary | ICD-10-CM

## 2019-03-23 LAB
FLUAV AG NPH QL IA: NEGATIVE
FLUBV AG NPH QL IA: NEGATIVE
SPECIMEN SOURCE: NORMAL

## 2019-03-23 PROCEDURE — 87804 INFLUENZA ASSAY W/OPTIC: CPT

## 2019-03-23 PROCEDURE — 99283 EMERGENCY DEPT VISIT LOW MDM: CPT | Performed by: EMERGENCY MEDICINE

## 2019-03-23 PROCEDURE — 71046 X-RAY EXAM CHEST 2 VIEWS: CPT

## 2019-03-23 RX ORDER — AMOXICILLIN AND CLAVULANATE POTASSIUM 875; 125 MG/1; MG/1
1 TABLET, FILM COATED ORAL 2 TIMES DAILY
Qty: 20 TAB | Refills: 0 | Status: SHIPPED | OUTPATIENT
Start: 2019-03-23 | End: 2019-04-02

## 2019-03-23 RX ORDER — FLUTICASONE PROPIONATE 50 MCG
2 SPRAY, SUSPENSION (ML) NASAL DAILY
Qty: 1 BOTTLE | Refills: 0 | Status: SHIPPED | OUTPATIENT
Start: 2019-03-23 | End: 2019-05-09

## 2019-03-24 NOTE — ED PROVIDER NOTES
Patient presents to the ER complaining of body aches, cough and congestion. Reports he said these symptoms for the past 2-1/2 weeks. Has been seen by his primary care physician and started on Mucinex without much improvement. He is concerned that symptoms of not getting any better. Reports cough is productive of clear to yellow sputum. Does report rhinorrhea and congestion intermittently as well. Denies any vomiting or chest pain. The history is provided by the patient. Cough This is a recurrent problem. The current episode started more than 1 week ago. The problem has not changed since onset. The cough is non-productive. There has been no fever. Associated symptoms include rhinorrhea and myalgias. Pertinent negatives include no chest pain, no chills, no eye redness, no ear pain, no headaches, no sore throat, no wheezing, no nausea, no vomiting and no confusion. He has tried nothing for the symptoms. Past Medical History:  
Diagnosis Date  Allergic rhinitis, cause unspecified 3/8/2013  CAD (coronary artery disease) 1/3/2013 MI age 62- no intervention; affirms SOB with 1 flight of steps  Cancer ([facility] [address]  Chest pain  Chronic pain   
 lumbar pain  Depression  Dizziness 3/8/2013  Dyslipidemia  Esophageal stenosis 8/16/2013  
 dxed on EGD early 2000's. Never dilated  Esophagitis 1/3/2013  GERD with esophagitis   
 not completely controlled with zantac  
 HTN (hypertension) 1/3/2013  
 controlled with med  Kidney stones 1/3/2013  Narcolepsy 1/3/2013  Prostate cancer ([address] 1/3/2013  Vertigo, benign positional   
 
 
Past Surgical History:  
Procedure Laterality Date  HX CARPAL TUNNEL RELEASE  HX CHOLECYSTECTOMY  2008?  HX LITHOTRIPSY  HX LUMBAR LAMINECTOMY x6 Dr. Jacek Márquez  HX ORTHOPAEDIC  2004  
 carpel tunnel/left hand  HX ORTHOPAEDIC  1995  
 carpel tunnel/right hand  HX PROSTATECTOMY  2008? Dr. Harjit Dockery  NEUROLOGICAL PROCEDURE UNLISTED Laminectomy Family History:  
Problem Relation Age of Onset  Depression Mother  Alcohol abuse Father  Depression Sister  Depression Brother  Depression Brother  Depression Sister  Depression Sister Social History Socioeconomic History  Marital status:  Spouse name: Not on file  Number of children: Not on file  Years of education: Not on file  Highest education level: Not on file Occupational History  Not on file Social Needs  Financial resource strain: Not on file  Food insecurity:  
  Worry: Not on file Inability: Not on file  Transportation needs:  
  Medical: Not on file Non-medical: Not on file Tobacco Use  Smoking status: Never Smoker  Smokeless tobacco: Never Used Substance and Sexual Activity  Alcohol use: No  
 Drug use: No  
 Sexual activity: Not on file Lifestyle  Physical activity:  
  Days per week: Not on file Minutes per session: Not on file  Stress: Not on file Relationships  Social connections:  
  Talks on phone: Not on file Gets together: Not on file Attends Religion service: Not on file Active member of club or organization: Not on file Attends meetings of clubs or organizations: Not on file Relationship status: Not on file  Intimate partner violence:  
  Fear of current or ex partner: Not on file Emotionally abused: Not on file Physically abused: Not on file Forced sexual activity: Not on file Other Topics Concern [address] Yes Comment: reserves - 8 m 1966  Blood Transfusions No  
 Caffeine Concern Yes Comment: 3 pepsis a day  Occupational Exposure Not Asked Elvin Scot Hazards Not Asked  Sleep Concern Not Asked  Stress Concern Not Asked  Weight Concern Not Asked  Special Diet No  
 Back Care Not Asked  Exercise Yes Comment: yard work, does some in the bed for neck  Bike Helmet Not Asked [address] [facility] Yes  Self-Exams Not Asked Social History Narrative  Not on file ALLERGIES: Patient has no known allergies. Review of Systems Constitutional: Negative for chills, fever and unexpected weight change. HENT: Positive for congestion and rhinorrhea. Negative for ear pain and sore throat. Eyes: Negative for photophobia, redness and visual disturbance. Respiratory: Positive for cough. Negative for chest tightness and wheezing. Cardiovascular: Negative for chest pain. Gastrointestinal: Negative for abdominal pain, nausea and vomiting. Endocrine: Negative for polydipsia and polyphagia. Musculoskeletal: Positive for myalgias. Negative for back pain. Skin: Negative for pallor and rash. Neurological: Negative for tremors, facial asymmetry, weakness and headaches. Hematological: Negative for adenopathy. Psychiatric/Behavioral: Negative for behavioral problems and confusion. All other systems reviewed and are negative. Vitals:  
 03/23/19 2015 BP: 122/76 Pulse: 86 Resp: 20 Temp: 97.4 °F (36.3 °C) SpO2: 98% Weight: 79.4 kg (175 lb) Height: 5' 10\" (1.778 m) Physical Exam  
Constitutional: He is oriented to person, place, and time. He appears well-developed and well-nourished. HENT:  
Head: Normocephalic and atraumatic. Eyes: Pupils are equal, round, and reactive to light. Conjunctivae and EOM are normal.  
Cardiovascular: Normal rate, regular rhythm, normal heart sounds and intact distal pulses. Exam reveals no friction rub. No murmur heard. Pulmonary/Chest: Effort normal and breath sounds normal. No stridor. No respiratory distress. Abdominal: Soft. Bowel sounds are normal.  
Musculoskeletal: Normal range of motion. He exhibits no edema or deformity. Neurological: He is alert and oriented to person, place, and time. No cranial nerve deficit. Nursing note and vitals reviewed. MDM Number of Diagnoses or Management Options Diagnosis management comments: Well-appearing here. We'll obtain chest x-ray and flu swab. 
 
9:19 PM 
Chest x-ray is clear. Flu swab was negative  We'll send home with treatment for acute sinusitis. Encouraged follow-up with PCP Amount and/or Complexity of Data Reviewed Clinical lab tests: ordered and reviewed Tests in the radiology section of CPT®: ordered and reviewed Risk of Complications, Morbidity, and/or Mortality Presenting problems: low Diagnostic procedures: low Management options: low Procedures Results Include: 
 
Recent Results (from the past 24 hour(s)) INFLUENZA A & B AG (RAPID TEST) Collection Time: 03/23/19  8:25 PM  
Result Value Ref Range Influenza A Ag NEGATIVE  NEG Influenza B Ag NEGATIVE  NEG Source NASOPHARYNGEAL Voice dictation software was used during the making of this note. This software is not perfect and grammatical and other typographical errors may be present. This note has been proofread, but may still contain errors.  
Boubacar Bazzi MD; 3/23/2019 @9:20 PM  
===================================================================

## 2019-03-24 NOTE — DISCHARGE INSTRUCTIONS
Patient Education        Sinusitis: Care Instructions  Your Care Instructions    Sinusitis is an infection of the lining of the sinus cavities in your head. Sinusitis often follows a cold. It causes pain and pressure in your head and face. In most cases, sinusitis gets better on its own in 1 to 2 weeks. But some mild symptoms may last for several weeks. Sometimes antibiotics are needed. Follow-up care is a key part of your treatment and safety. Be sure to make and go to all appointments, and call your doctor if you are having problems. It's also a good idea to know your test results and keep a list of the medicines you take. How can you care for yourself at home? · Take an over-the-counter pain medicine, such as acetaminophen (Tylenol), ibuprofen (Advil, Motrin), or naproxen (Aleve). Read and follow all instructions on the label. · If the doctor prescribed antibiotics, take them as directed. Do not stop taking them just because you feel better. You need to take the full course of antibiotics. · Be careful when taking over-the-counter cold or flu medicines and Tylenol at the same time. Many of these medicines have acetaminophen, which is Tylenol. Read the labels to make sure that you are not taking more than the recommended dose. Too much acetaminophen (Tylenol) can be harmful. · Breathe warm, moist air from a steamy shower, a hot bath, or a sink filled with hot water. Avoid cold, dry air. Using a humidifier in your home may help. Follow the directions for cleaning the machine. · Use saline (saltwater) nasal washes to help keep your nasal passages open and wash out mucus and bacteria. You can buy saline nose drops at a grocery store or drugstore. Or you can make your own at home by adding 1 teaspoon of salt and 1 teaspoon of baking soda to 2 cups of distilled water. If you make your own, fill a bulb syringe with the solution, insert the tip into your nostril, and squeeze gently. Lupie Deary your nose.   · Put a hot, wet towel or a warm gel pack on your face 3 or 4 times a day for 5 to 10 minutes each time. · Try a decongestant nasal spray like oxymetazoline (Afrin). Do not use it for more than 3 days in a row. Using it for more than 3 days can make your congestion worse. When should you call for help? Call your doctor now or seek immediate medical care if:    · You have new or worse swelling or redness in your face or around your eyes.     · You have a new or higher fever.    Watch closely for changes in your health, and be sure to contact your doctor if:    · You have new or worse facial pain.     · The mucus from your nose becomes thicker (like pus) or has new blood in it.     · You are not getting better as expected. Where can you learn more? Go to http://oracio-gage.info/. Enter Q296 in the search box to learn more about \"Sinusitis: Care Instructions. \"  Current as of: March 27, 2018  Content Version: 11.9  © 2411-1707 [employer]. Care instructions adapted under license by [employer] (which disclaims liability or warranty for this information). If you have questions about a medical condition or this instruction, always ask your healthcare professional. [place] [address] any warranty or liability for your use of this information.

## 2019-03-24 NOTE — ED NOTES
I have reviewed discharge instructions with the patient. The patient verbalized understanding. Patient left ED via Discharge Method: ambulatory with cane (refused WC) to Home with self. Opportunity for questions and clarification provided. Patient given 3 scripts. To continue your aftercare when you leave the hospital, you may receive an automated call from our care team to check in on how you are doing. This is a free service and part of our promise to provide the best care and service to meet your aftercare needs.  If you have questions, or wish to unsubscribe from this service please call [phone]. Thank you for Choosing our [facility] Emergency Department.

## 2019-04-01 ENCOUNTER — APPOINTMENT (OUTPATIENT)
Dept: MRI IMAGING | Age: 74
End: 2019-04-01
Attending: EMERGENCY MEDICINE
Payer: MEDICARE

## 2019-04-01 ENCOUNTER — HOSPITAL ENCOUNTER (EMERGENCY)
Age: 74
Discharge: HOME | End: 2019-04-01
Attending: EMERGENCY MEDICINE | Admitting: EMERGENCY MEDICINE
Payer: MEDICARE

## 2019-04-01 VITALS
SYSTOLIC BLOOD PRESSURE: 142 MMHG | OXYGEN SATURATION: 98 % | RESPIRATION RATE: 16 BRPM | TEMPERATURE: 98.2 F | HEART RATE: 76 BPM | DIASTOLIC BLOOD PRESSURE: 85 MMHG

## 2019-04-01 DIAGNOSIS — R42 VERTIGO: Primary | ICD-10-CM

## 2019-04-01 LAB
ALBUMIN SERPL-MCNC: 3.7 G/DL (ref 3.2–4.6)
ALBUMIN/GLOB SERPL: 1.1 {RATIO}
ALP SERPL-CCNC: 96 U/L (ref 50–136)
ALT SERPL-CCNC: 18 U/L (ref 12–65)
ANION GAP SERPL CALC-SCNC: 7 MMOL/L
AST SERPL-CCNC: 19 U/L (ref 15–37)
ATRIAL RATE: 66 BPM
BASOPHILS # BLD: 0 K/UL (ref 0–0.2)
BASOPHILS NFR BLD: 1 % (ref 0–2)
BILIRUB SERPL-MCNC: 0.4 MG/DL (ref 0.2–1.1)
BUN SERPL-MCNC: 15 MG/DL (ref 8–23)
CALCIUM SERPL-MCNC: 9 MG/DL (ref 8.3–10.4)
CALCULATED P AXIS, ECG09: -22 DEGREES
CALCULATED R AXIS, ECG10: 18 DEGREES
CALCULATED T AXIS, ECG11: 28 DEGREES
CHLORIDE SERPL-SCNC: 105 MMOL/L (ref 98–107)
CO2 SERPL-SCNC: 29 MMOL/L (ref 21–32)
CREAT SERPL-MCNC: 1.12 MG/DL (ref 0.8–1.5)
DIAGNOSIS, 93000: NORMAL
DIFFERENTIAL METHOD BLD: ABNORMAL
EOSINOPHIL # BLD: 0.2 K/UL (ref 0–0.8)
EOSINOPHIL NFR BLD: 4 % (ref 0.5–7.8)
ERYTHROCYTE [DISTWIDTH] IN BLOOD BY AUTOMATED COUNT: 13.3 % (ref 11.9–14.6)
GLOBULIN SER CALC-MCNC: 3.3 G/DL (ref 2.3–3.5)
GLUCOSE SERPL-MCNC: 86 MG/DL (ref 65–100)
HCT VFR BLD AUTO: 40.4 % (ref 41.1–50.3)
HGB BLD-MCNC: 13.2 G/DL (ref 13.6–17.2)
IMM GRANULOCYTES # BLD AUTO: 0 K/UL (ref 0–0.5)
IMM GRANULOCYTES NFR BLD AUTO: 0 % (ref 0–5)
LYMPHOCYTES # BLD: 1.5 K/UL (ref 0.5–4.6)
LYMPHOCYTES NFR BLD: 33 % (ref 13–44)
MCH RBC QN AUTO: 29.5 PG (ref 26.1–32.9)
MCHC RBC AUTO-ENTMCNC: 32.7 G/DL (ref 31.4–35)
MCV RBC AUTO: 90.2 FL (ref 79.6–97.8)
MONOCYTES # BLD: 0.6 K/UL (ref 0.1–1.3)
MONOCYTES NFR BLD: 14 % (ref 4–12)
NEUTS SEG # BLD: 2.2 K/UL (ref 1.7–8.2)
NEUTS SEG NFR BLD: 49 % (ref 43–78)
NRBC # BLD: 0 K/UL (ref 0–0.2)
P-R INTERVAL, ECG05: 144 MS
PLATELET # BLD AUTO: 214 K/UL (ref 150–450)
PMV BLD AUTO: 9.5 FL (ref 9.4–12.3)
POTASSIUM SERPL-SCNC: 3.1 MMOL/L (ref 3.5–5.1)
PROT SERPL-MCNC: 7 G/DL
Q-T INTERVAL, ECG07: 360 MS
QRS DURATION, ECG06: 86 MS
QTC CALCULATION (BEZET), ECG08: 377 MS
RBC # BLD AUTO: 4.48 M/UL (ref 4.23–5.6)
SODIUM SERPL-SCNC: 141 MMOL/L (ref 136–145)
TROPONIN I BLD-MCNC: 0 NG/ML (ref 0.02–0.05)
VENTRICULAR RATE, ECG03: 66 BPM
WBC # BLD AUTO: 4.5 K/UL (ref 4.3–11.1)

## 2019-04-01 PROCEDURE — 84484 ASSAY OF TROPONIN QUANT: CPT

## 2019-04-01 PROCEDURE — 74011250637 HC RX REV CODE- 250/637: Performed by: EMERGENCY MEDICINE

## 2019-04-01 PROCEDURE — 85025 COMPLETE CBC W/AUTO DIFF WBC: CPT

## 2019-04-01 PROCEDURE — 99285 EMERGENCY DEPT VISIT HI MDM: CPT | Performed by: EMERGENCY MEDICINE

## 2019-04-01 PROCEDURE — 93005 ELECTROCARDIOGRAM TRACING: CPT | Performed by: EMERGENCY MEDICINE

## 2019-04-01 PROCEDURE — 70551 MRI BRAIN STEM W/O DYE: CPT

## 2019-04-01 PROCEDURE — 80053 COMPREHEN METABOLIC PANEL: CPT

## 2019-04-01 RX ORDER — DIAZEPAM 5 MG/1
5 TABLET ORAL ONCE
Status: COMPLETED | OUTPATIENT
Start: 2019-04-01 | End: 2019-04-01

## 2019-04-01 RX ORDER — MECLIZINE HYDROCHLORIDE 25 MG/1
25 TABLET ORAL
Qty: 30 TAB | Refills: 1 | Status: SHIPPED | OUTPATIENT
Start: 2019-04-01 | End: 2021-11-04

## 2019-04-01 RX ADMIN — DIAZEPAM 5 MG: 5 TABLET ORAL at 15:41

## 2019-04-01 NOTE — DISCHARGE INSTRUCTIONS
Follow-up with your doctor, call the office to make an appointment. Return to the emergency room if your symptoms worsen.

## 2019-04-01 NOTE — ED PROVIDER NOTES
Patient states around 1 PM today he became suddenly dizzy and lightheaded. He states that he fell back against the wall and then slid to the ground. His symptoms persisted, stating he still feels dizzy but his symptoms have markedly improved. He denies any nausea or vomiting. He states he has had vertigo in the past but it usually feels like the room is spinning. He denies such symptoms today. He denies any headache, denies any aggravating or alleviating factors. Elements of this note were created using speech recognition software. As such, errors of speech recognition may be present. Past Medical History:  
Diagnosis Date  Allergic rhinitis, cause unspecified 3/8/2013  CAD (coronary artery disease) 1/3/2013 MI age 62- no intervention; affirms SOB with 1 flight of steps  Cancer ([facility] [address]  Chest pain  Chronic pain   
 lumbar pain  Depression  Dizziness 3/8/2013  Dyslipidemia  Esophageal stenosis 8/16/2013  
 dxed on EGD early 2000's. Never dilated  Esophagitis 1/3/2013  GERD with esophagitis   
 not completely controlled with zantac  
 HTN (hypertension) 1/3/2013  
 controlled with med  Kidney stones 1/3/2013  Narcolepsy 1/3/2013  Prostate cancer ([facility] [address] 1/3/2013  Vertigo, benign positional   
 
 
Past Surgical History:  
Procedure Laterality Date  HX CARPAL TUNNEL RELEASE  HX CHOLECYSTECTOMY  2008?  HX LITHOTRIPSY  HX LUMBAR LAMINECTOMY x6 Dr. Felipa Newsome  HX ORTHOPAEDIC  2004  
 carpel tunnel/left hand  HX ORTHOPAEDIC  1995  
 carpel tunnel/right hand  HX PROSTATECTOMY  2008? Dr. Raúl Gunderson  NEUROLOGICAL PROCEDURE UNLISTED Laminectomy Family History:  
Problem Relation Age of Onset  Depression Mother  Alcohol abuse Father  Depression Sister  Depression Brother  Depression Brother  Depression Sister  Depression Sister Social History Socioeconomic History  Marital status:  Spouse name: Not on file  Number of children: Not on file  Years of education: Not on file  Highest education level: Not on file Occupational History  Not on file Social Needs  Financial resource strain: Not on file  Food insecurity:  
  Worry: Not on file Inability: Not on file  Transportation needs:  
  Medical: Not on file Non-medical: Not on file Tobacco Use  Smoking status: Never Smoker  Smokeless tobacco: Never Used Substance and Sexual Activity  Alcohol use: No  
 Drug use: No  
 Sexual activity: Not on file Lifestyle  Physical activity:  
  Days per week: Not on file Minutes per session: Not on file  Stress: Not on file Relationships  Social connections:  
  Talks on phone: Not on file Gets together: Not on file Attends Alevism service: Not on file Active member of club or organization: Not on file Attends meetings of clubs or organizations: Not on file Relationship status: Not on file  Intimate partner violence:  
  Fear of current or ex partner: Not on file Emotionally abused: Not on file Physically abused: Not on file Forced sexual activity: Not on file Other Topics Concern [address] Yes Comment: reserves - 8 m 1966  Blood Transfusions No  
 Caffeine Concern Yes Comment: 3 pepsis a day  Occupational Exposure Not Asked Roland Backer Hazards Not Asked  Sleep Concern Not Asked  Stress Concern Not Asked  Weight Concern Not Asked  Special Diet No  
 Back Care Not Asked  Exercise Yes Comment: yard work, does some in the bed for neck  Bike Helmet Not Asked [address] [facility] Yes  Self-Exams Not Asked Social History Narrative  Not on file ALLERGIES: Patient has no known allergies. Review of Systems Constitutional: Negative for chills and fever. Gastrointestinal: Negative for nausea and vomiting. All other systems reviewed and are negative. Vitals:  
 04/01/19 1349 BP: 154/77 Pulse: 72 Resp: 18 SpO2: 97% Physical Exam  
Constitutional: He is oriented to person, place, and time. He appears well-developed and well-nourished. HENT:  
Head: Normocephalic and atraumatic. Eyes: Pupils are equal, round, and reactive to light. Conjunctivae are normal.  
Neck: Normal range of motion. Neck supple. Cardiovascular: Normal rate, regular rhythm and normal heart sounds. Pulmonary/Chest: Effort normal and breath sounds normal.  
Abdominal: Soft. Bowel sounds are normal.  
Musculoskeletal: Normal range of motion. He exhibits no edema. Neurological: He is alert and oriented to person, place, and time. No nystagmus Skin: Skin is warm and dry. Nursing note and vitals reviewed. MDM Number of Diagnoses or Management Options Vertigo: new and does not require workup Diagnosis management comments: 6:54 PM MRI of his brain shows no evidence of stroke. He has a primary doctor he can follow up with. Amount and/or Complexity of Data Reviewed Clinical lab tests: ordered and reviewed Tests in the radiology section of CPT®: ordered and reviewed Risk of Complications, Morbidity, and/or Mortality Presenting problems: moderate Diagnostic procedures: moderate Management options: moderate Patient Progress Patient progress: stable Procedures

## 2019-04-01 NOTE — ED TRIAGE NOTES
Patient ambulatory to triage with cane. Son present for triage. Patient reports that about 1 hour ago that he became dizzy in the bathroom and fell against the bathroom door and went down to the floor. Per son patient crawled across the house for help. Unknown loc. Denies nausea or vomiting.

## 2019-04-01 NOTE — ED NOTES
I have reviewed discharge instructions with the patient. The patient verbalized understanding. Patient left ED via Discharge Method: ambulatory to Home with family Opportunity for questions and clarification provided. Patient given 1 scripts. To continue your aftercare when you leave the hospital, you may receive an automated call from our care team to check in on how you are doing. This is a free service and part of our promise to provide the best care and service to meet your aftercare needs.  If you have questions, or wish to unsubscribe from this service please call [phone]. Thank you for Choosing our [facility] Emergency Department.

## 2019-04-09 PROBLEM — M53.3 CHRONIC RIGHT SI JOINT PAIN: Status: ACTIVE | Noted: 2019-02-27

## 2019-04-09 PROBLEM — G89.29 CHRONIC BILATERAL LOW BACK PAIN WITH RIGHT-SIDED SCIATICA: Status: ACTIVE | Noted: 2019-02-27

## 2019-04-09 PROBLEM — M54.41 CHRONIC BILATERAL LOW BACK PAIN WITH RIGHT-SIDED SCIATICA: Status: ACTIVE | Noted: 2019-02-27

## 2019-04-09 PROBLEM — G89.29 CHRONIC RIGHT SI JOINT PAIN: Status: ACTIVE | Noted: 2019-02-27

## 2019-04-09 PROBLEM — T81.9XXA COMPLICATION OF SURGICAL PROCEDURE: Status: ACTIVE | Noted: 2019-02-27

## 2019-05-09 PROBLEM — Z01.810 PREOP CARDIOVASCULAR EXAM: Status: ACTIVE | Noted: 2017-04-29

## 2019-07-08 ENCOUNTER — PATIENT OUTREACH (OUTPATIENT)
Dept: CASE MANAGEMENT | Age: 74
End: 2019-07-08

## 2019-07-11 ENCOUNTER — PATIENT OUTREACH (OUTPATIENT)
Dept: CASE MANAGEMENT | Age: 74
End: 2019-07-11

## 2019-07-15 ENCOUNTER — PATIENT OUTREACH (OUTPATIENT)
Dept: CASE MANAGEMENT | Age: 74
End: 2019-07-15

## 2019-07-26 ENCOUNTER — PATIENT OUTREACH (OUTPATIENT)
Dept: CASE MANAGEMENT | Age: 74
End: 2019-07-26

## 2020-10-18 ENCOUNTER — APPOINTMENT (OUTPATIENT)
Dept: GENERAL RADIOLOGY | Age: 75
DRG: 690 | End: 2020-10-18
Attending: EMERGENCY MEDICINE
Payer: MEDICARE

## 2020-10-18 ENCOUNTER — HOSPITAL ENCOUNTER (INPATIENT)
Age: 75
LOS: 1 days | Discharge: HOME | DRG: 690 | End: 2020-10-19
Attending: EMERGENCY MEDICINE | Admitting: FAMILY MEDICINE
Payer: MEDICARE

## 2020-10-18 DIAGNOSIS — I95.9 HYPOTENSION, UNSPECIFIED HYPOTENSION TYPE: Primary | ICD-10-CM

## 2020-10-18 DIAGNOSIS — N39.0 ACUTE UTI: ICD-10-CM

## 2020-10-18 DIAGNOSIS — E87.20 LACTIC ACIDOSIS: ICD-10-CM

## 2020-10-18 PROBLEM — E55.9 VITAMIN D DEFICIENCY: Status: RESOLVED | Noted: 2017-09-29 | Resolved: 2020-10-18

## 2020-10-18 PROBLEM — M54.41 CHRONIC BILATERAL LOW BACK PAIN WITH RIGHT-SIDED SCIATICA: Status: RESOLVED | Noted: 2019-02-27 | Resolved: 2020-10-18

## 2020-10-18 PROBLEM — I51.7 CARDIOMEGALY: Status: RESOLVED | Noted: 2017-03-14 | Resolved: 2020-10-18

## 2020-10-18 PROBLEM — R07.9 CHEST PAIN: Status: RESOLVED | Noted: 2017-03-14 | Resolved: 2020-10-18

## 2020-10-18 PROBLEM — T81.9XXA COMPLICATION OF SURGICAL PROCEDURE: Status: RESOLVED | Noted: 2019-02-27 | Resolved: 2020-10-18

## 2020-10-18 PROBLEM — M53.3 CHRONIC RIGHT SI JOINT PAIN: Status: RESOLVED | Noted: 2019-02-27 | Resolved: 2020-10-18

## 2020-10-18 PROBLEM — F32.A DEPRESSION: Status: RESOLVED | Noted: 2017-03-14 | Resolved: 2020-10-18

## 2020-10-18 PROBLEM — R32 URINARY INCONTINENCE: Status: RESOLVED | Noted: 2018-04-15 | Resolved: 2020-10-18

## 2020-10-18 PROBLEM — R42 VERTIGO: Status: RESOLVED | Noted: 2017-11-14 | Resolved: 2020-10-18

## 2020-10-18 PROBLEM — G89.29 CHRONIC BILATERAL LOW BACK PAIN WITH RIGHT-SIDED SCIATICA: Status: RESOLVED | Noted: 2019-02-27 | Resolved: 2020-10-18

## 2020-10-18 PROBLEM — G89.29 CHRONIC RIGHT SI JOINT PAIN: Status: RESOLVED | Noted: 2019-02-27 | Resolved: 2020-10-18

## 2020-10-18 LAB
ALBUMIN SERPL-MCNC: 3.5 G/DL (ref 3.2–4.6)
ALBUMIN/GLOB SERPL: 1.1 {RATIO} (ref 1.2–3.5)
ALP SERPL-CCNC: 98 U/L (ref 50–136)
ALT SERPL-CCNC: 12 U/L (ref 12–65)
ANION GAP SERPL CALC-SCNC: 5 MMOL/L (ref 7–16)
APPEARANCE UR: ABNORMAL
AST SERPL-CCNC: 12 U/L (ref 15–37)
BACTERIA URNS QL MICRO: ABNORMAL /HPF
BASOPHILS # BLD: 0 K/UL (ref 0–0.2)
BASOPHILS NFR BLD: 0 % (ref 0–2)
BILIRUB SERPL-MCNC: 0.3 MG/DL (ref 0.2–1.1)
BILIRUB UR QL: ABNORMAL
BUN SERPL-MCNC: 29 MG/DL (ref 8–23)
CALCIUM SERPL-MCNC: 9 MG/DL (ref 8.3–10.4)
CASTS URNS QL MICRO: ABNORMAL /LPF
CHLORIDE SERPL-SCNC: 109 MMOL/L (ref 98–107)
CO2 SERPL-SCNC: 27 MMOL/L (ref 21–32)
COLOR UR: ABNORMAL
CREAT SERPL-MCNC: 1.43 MG/DL (ref 0.8–1.5)
CRYSTALS URNS QL MICRO: ABNORMAL /LPF
DIFFERENTIAL METHOD BLD: ABNORMAL
EOSINOPHIL # BLD: 0.2 K/UL (ref 0–0.8)
EOSINOPHIL NFR BLD: 3 % (ref 0.5–7.8)
EPI CELLS #/AREA URNS HPF: ABNORMAL /HPF
ERYTHROCYTE [DISTWIDTH] IN BLOOD BY AUTOMATED COUNT: 15.3 % (ref 11.9–14.6)
GLOBULIN SER CALC-MCNC: 3.2 G/DL (ref 2.3–3.5)
GLUCOSE SERPL-MCNC: 119 MG/DL (ref 65–100)
GLUCOSE UR STRIP.AUTO-MCNC: NEGATIVE MG/DL
HCT VFR BLD AUTO: 38.6 % (ref 41.1–50.3)
HGB BLD-MCNC: 12.1 G/DL (ref 13.6–17.2)
HGB UR QL STRIP: ABNORMAL
IMM GRANULOCYTES # BLD AUTO: 0 K/UL (ref 0–0.5)
IMM GRANULOCYTES NFR BLD AUTO: 0 % (ref 0–5)
KETONES UR QL STRIP.AUTO: ABNORMAL MG/DL
LACTATE SERPL-SCNC: 1.2 MMOL/L (ref 0.4–2)
LACTATE SERPL-SCNC: 3.2 MMOL/L (ref 0.4–2)
LEUKOCYTE ESTERASE UR QL STRIP.AUTO: ABNORMAL
LYMPHOCYTES # BLD: 1.1 K/UL (ref 0.5–4.6)
LYMPHOCYTES NFR BLD: 14 % (ref 13–44)
MCH RBC QN AUTO: 29 PG (ref 26.1–32.9)
MCHC RBC AUTO-ENTMCNC: 31.3 G/DL (ref 31.4–35)
MCV RBC AUTO: 92.6 FL (ref 79.6–97.8)
MONOCYTES # BLD: 0.6 K/UL (ref 0.1–1.3)
MONOCYTES NFR BLD: 8 % (ref 4–12)
MUCOUS THREADS URNS QL MICRO: ABNORMAL /LPF
NEUTS SEG # BLD: 5.8 K/UL (ref 1.7–8.2)
NEUTS SEG NFR BLD: 74 % (ref 43–78)
NITRITE UR QL STRIP.AUTO: NEGATIVE
NRBC # BLD: 0 K/UL (ref 0–0.2)
PH UR STRIP: 5.5 [PH] (ref 5–9)
PLATELET # BLD AUTO: 186 K/UL (ref 150–450)
PMV BLD AUTO: 9.7 FL (ref 9.4–12.3)
POTASSIUM SERPL-SCNC: 4.7 MMOL/L (ref 3.5–5.1)
PROT SERPL-MCNC: 6.7 G/DL (ref 6.3–8.2)
PROT UR STRIP-MCNC: 100 MG/DL
RBC # BLD AUTO: 4.17 M/UL (ref 4.23–5.6)
RBC #/AREA URNS HPF: ABNORMAL /HPF
SODIUM SERPL-SCNC: 141 MMOL/L (ref 136–145)
SP GR UR REFRACTOMETRY: 1.02 (ref 1–1.02)
TROPONIN-HIGH SENSITIVITY: 54.7 PG/ML (ref 0–14)
TROPONIN-HIGH SENSITIVITY: 57.6 PG/ML (ref 0–14)
UROBILINOGEN UR QL STRIP.AUTO: 1 EU/DL (ref 0.2–1)
WBC # BLD AUTO: 7.8 K/UL (ref 4.3–11.1)
WBC URNS QL MICRO: ABNORMAL /HPF

## 2020-10-18 PROCEDURE — 80053 COMPREHEN METABOLIC PANEL: CPT

## 2020-10-18 PROCEDURE — 74011250636 HC RX REV CODE- 250/636: Performed by: EMERGENCY MEDICINE

## 2020-10-18 PROCEDURE — 74011250636 HC RX REV CODE- 250/636: Performed by: FAMILY MEDICINE

## 2020-10-18 PROCEDURE — 96361 HYDRATE IV INFUSION ADD-ON: CPT

## 2020-10-18 PROCEDURE — 99285 EMERGENCY DEPT VISIT HI MDM: CPT

## 2020-10-18 PROCEDURE — 87086 URINE CULTURE/COLONY COUNT: CPT

## 2020-10-18 PROCEDURE — 71045 X-RAY EXAM CHEST 1 VIEW: CPT

## 2020-10-18 PROCEDURE — 74011250637 HC RX REV CODE- 250/637: Performed by: FAMILY MEDICINE

## 2020-10-18 PROCEDURE — 36415 COLL VENOUS BLD VENIPUNCTURE: CPT

## 2020-10-18 PROCEDURE — 85025 COMPLETE CBC W/AUTO DIFF WBC: CPT

## 2020-10-18 PROCEDURE — 96372 THER/PROPH/DIAG INJ SC/IM: CPT

## 2020-10-18 PROCEDURE — 84484 ASSAY OF TROPONIN QUANT: CPT

## 2020-10-18 PROCEDURE — 83605 ASSAY OF LACTIC ACID: CPT

## 2020-10-18 PROCEDURE — 87040 BLOOD CULTURE FOR BACTERIA: CPT

## 2020-10-18 PROCEDURE — 74011000258 HC RX REV CODE- 258: Performed by: EMERGENCY MEDICINE

## 2020-10-18 PROCEDURE — 96374 THER/PROPH/DIAG INJ IV PUSH: CPT

## 2020-10-18 PROCEDURE — 81001 URINALYSIS AUTO W/SCOPE: CPT

## 2020-10-18 PROCEDURE — 65270000029 HC RM PRIVATE

## 2020-10-18 PROCEDURE — 2709999900 HC NON-CHARGEABLE SUPPLY

## 2020-10-18 PROCEDURE — 99218 HC RM OBSERVATION: CPT

## 2020-10-18 RX ORDER — ATORVASTATIN CALCIUM 10 MG/1
10 TABLET, FILM COATED ORAL
Status: DISCONTINUED | OUTPATIENT
Start: 2020-10-18 | End: 2020-10-18

## 2020-10-18 RX ORDER — TRAMADOL HYDROCHLORIDE 50 MG/1
50 TABLET ORAL
COMMUNITY
End: 2021-11-04 | Stop reason: SINTOL

## 2020-10-18 RX ORDER — DEXTROAMPHETAMINE SACCHARATE, AMPHETAMINE ASPARTATE, DEXTROAMPHETAMINE SULFATE AND AMPHETAMINE SULFATE 3.75; 3.75; 3.75; 3.75 MG/1; MG/1; MG/1; MG/1
30 TABLET ORAL 2 TIMES DAILY
Status: DISCONTINUED | OUTPATIENT
Start: 2020-10-18 | End: 2020-10-19 | Stop reason: HOSPADM

## 2020-10-18 RX ORDER — NALOXONE HYDROCHLORIDE 0.4 MG/ML
0.4 INJECTION, SOLUTION INTRAMUSCULAR; INTRAVENOUS; SUBCUTANEOUS AS NEEDED
Status: DISCONTINUED | OUTPATIENT
Start: 2020-10-18 | End: 2020-10-19 | Stop reason: HOSPADM

## 2020-10-18 RX ORDER — HYDROCODONE BITARTRATE AND ACETAMINOPHEN 10; 325 MG/1; MG/1
1 TABLET ORAL
Status: DISCONTINUED | OUTPATIENT
Start: 2020-10-18 | End: 2020-10-19 | Stop reason: HOSPADM

## 2020-10-18 RX ORDER — CELECOXIB 200 MG/1
200 CAPSULE ORAL DAILY
Status: ON HOLD | COMMUNITY
End: 2021-11-04 | Stop reason: CLARIF

## 2020-10-18 RX ORDER — AMLODIPINE BESYLATE 5 MG/1
5 TABLET ORAL DAILY
COMMUNITY
End: 2021-11-06

## 2020-10-18 RX ORDER — SODIUM CHLORIDE 0.9 % (FLUSH) 0.9 %
5-40 SYRINGE (ML) INJECTION EVERY 8 HOURS
Status: DISCONTINUED | OUTPATIENT
Start: 2020-10-18 | End: 2020-10-19 | Stop reason: HOSPADM

## 2020-10-18 RX ORDER — BISACODYL 5 MG
5 TABLET, DELAYED RELEASE (ENTERIC COATED) ORAL DAILY PRN
Status: DISCONTINUED | OUTPATIENT
Start: 2020-10-18 | End: 2020-10-19 | Stop reason: HOSPADM

## 2020-10-18 RX ORDER — BACLOFEN 10 MG/1
5 TABLET ORAL
Status: DISCONTINUED | OUTPATIENT
Start: 2020-10-18 | End: 2020-10-19 | Stop reason: HOSPADM

## 2020-10-18 RX ORDER — SODIUM CHLORIDE 0.9 % (FLUSH) 0.9 %
5-40 SYRINGE (ML) INJECTION AS NEEDED
Status: DISCONTINUED | OUTPATIENT
Start: 2020-10-18 | End: 2020-10-19 | Stop reason: HOSPADM

## 2020-10-18 RX ORDER — GABAPENTIN 300 MG/1
300 CAPSULE ORAL 3 TIMES DAILY
COMMUNITY

## 2020-10-18 RX ORDER — LORATADINE 10 MG/1
10 TABLET ORAL DAILY
Status: DISCONTINUED | OUTPATIENT
Start: 2020-10-18 | End: 2020-10-18

## 2020-10-18 RX ORDER — BUSPIRONE HYDROCHLORIDE 15 MG/1
15 TABLET ORAL DAILY
COMMUNITY

## 2020-10-18 RX ORDER — SERTRALINE HYDROCHLORIDE 100 MG/1
100 TABLET, FILM COATED ORAL 2 TIMES DAILY
Status: DISCONTINUED | OUTPATIENT
Start: 2020-10-18 | End: 2020-10-19 | Stop reason: HOSPADM

## 2020-10-18 RX ORDER — GABAPENTIN 100 MG/1
100 CAPSULE ORAL
Status: DISCONTINUED | OUTPATIENT
Start: 2020-10-18 | End: 2020-10-18

## 2020-10-18 RX ORDER — GABAPENTIN 300 MG/1
300 CAPSULE ORAL 2 TIMES DAILY
Status: DISCONTINUED | OUTPATIENT
Start: 2020-10-18 | End: 2020-10-19 | Stop reason: HOSPADM

## 2020-10-18 RX ORDER — CELECOXIB 200 MG/1
200 CAPSULE ORAL 2 TIMES DAILY
Status: DISCONTINUED | OUTPATIENT
Start: 2020-10-18 | End: 2020-10-19 | Stop reason: HOSPADM

## 2020-10-18 RX ORDER — ACETAMINOPHEN 325 MG/1
650 TABLET ORAL
Status: DISCONTINUED | OUTPATIENT
Start: 2020-10-18 | End: 2020-10-19 | Stop reason: HOSPADM

## 2020-10-18 RX ORDER — SERTRALINE HYDROCHLORIDE 100 MG/1
200 TABLET, FILM COATED ORAL DAILY
Status: DISCONTINUED | OUTPATIENT
Start: 2020-10-18 | End: 2020-10-18

## 2020-10-18 RX ORDER — BUSPIRONE HYDROCHLORIDE 5 MG/1
15 TABLET ORAL DAILY
Status: DISCONTINUED | OUTPATIENT
Start: 2020-10-18 | End: 2020-10-19 | Stop reason: HOSPADM

## 2020-10-18 RX ORDER — ENOXAPARIN SODIUM 100 MG/ML
40 INJECTION SUBCUTANEOUS EVERY 24 HOURS
Status: DISCONTINUED | OUTPATIENT
Start: 2020-10-18 | End: 2020-10-19 | Stop reason: HOSPADM

## 2020-10-18 RX ORDER — TAMSULOSIN HYDROCHLORIDE 0.4 MG/1
0.8 CAPSULE ORAL DAILY
Status: DISCONTINUED | OUTPATIENT
Start: 2020-10-19 | End: 2020-10-19 | Stop reason: HOSPADM

## 2020-10-18 RX ORDER — POTASSIUM CHLORIDE 20 MEQ/1
20 TABLET, EXTENDED RELEASE ORAL
Status: COMPLETED | OUTPATIENT
Start: 2020-10-18 | End: 2020-10-18

## 2020-10-18 RX ORDER — TAMSULOSIN HYDROCHLORIDE 0.4 MG/1
0.8 CAPSULE ORAL DAILY
Status: ON HOLD | COMMUNITY
End: 2021-11-04 | Stop reason: CLARIF

## 2020-10-18 RX ORDER — SODIUM CHLORIDE 9 MG/ML
100 INJECTION, SOLUTION INTRAVENOUS CONTINUOUS
Status: DISCONTINUED | OUTPATIENT
Start: 2020-10-18 | End: 2020-10-19 | Stop reason: HOSPADM

## 2020-10-18 RX ADMIN — DEXTROAMPHETAMINE SACCHARATE, AMPHETAMINE ASPARTATE, DEXTROAMPHETAMINE SULFATE AND AMPHETAMINE SULFATE 30 MG: 3.75; 3.75; 3.75; 3.75 TABLET ORAL at 17:34

## 2020-10-18 RX ADMIN — SODIUM CHLORIDE 100 ML/HR: 900 INJECTION, SOLUTION INTRAVENOUS at 05:28

## 2020-10-18 RX ADMIN — SERTRALINE HYDROCHLORIDE 200 MG: 100 TABLET ORAL at 09:06

## 2020-10-18 RX ADMIN — CELECOXIB 200 MG: 200 CAPSULE ORAL at 20:16

## 2020-10-18 RX ADMIN — POTASSIUM CHLORIDE 20 MEQ: 1500 TABLET, EXTENDED RELEASE ORAL at 05:48

## 2020-10-18 RX ADMIN — ATORVASTATIN CALCIUM 10 MG: 10 TABLET, FILM COATED ORAL at 05:48

## 2020-10-18 RX ADMIN — ACETAMINOPHEN 650 MG: 325 TABLET, FILM COATED ORAL at 20:16

## 2020-10-18 RX ADMIN — SERTRALINE HYDROCHLORIDE 100 MG: 100 TABLET ORAL at 20:16

## 2020-10-18 RX ADMIN — BUSPIRONE HYDROCHLORIDE 15 MG: 5 TABLET ORAL at 09:06

## 2020-10-18 RX ADMIN — HYDROCODONE BITARTRATE AND ACETAMINOPHEN 1 TABLET: 10; 325 TABLET ORAL at 05:48

## 2020-10-18 RX ADMIN — CEFTRIAXONE 1 G: 1 INJECTION, POWDER, FOR SOLUTION INTRAMUSCULAR; INTRAVENOUS at 04:36

## 2020-10-18 RX ADMIN — Medication 10 ML: at 05:48

## 2020-10-18 RX ADMIN — HYDROCODONE BITARTRATE AND ACETAMINOPHEN 1 TABLET: 10; 325 TABLET ORAL at 17:38

## 2020-10-18 RX ADMIN — ACETAMINOPHEN 650 MG: 325 TABLET, FILM COATED ORAL at 10:46

## 2020-10-18 RX ADMIN — SODIUM CHLORIDE 500 ML: 900 INJECTION, SOLUTION INTRAVENOUS at 02:38

## 2020-10-18 RX ADMIN — LORATADINE 10 MG: 10 TABLET ORAL at 09:06

## 2020-10-18 RX ADMIN — Medication 10 ML: at 21:14

## 2020-10-18 RX ADMIN — GABAPENTIN 300 MG: 300 CAPSULE ORAL at 20:16

## 2020-10-18 RX ADMIN — GABAPENTIN 100 MG: 100 CAPSULE ORAL at 10:52

## 2020-10-18 RX ADMIN — SODIUM CHLORIDE 100 ML/HR: 900 INJECTION, SOLUTION INTRAVENOUS at 15:38

## 2020-10-18 RX ADMIN — ENOXAPARIN SODIUM 40 MG: 40 INJECTION SUBCUTANEOUS at 09:05

## 2020-10-18 NOTE — ED NOTES
TRANSFER - OUT REPORT:    Verbal report given to rani BALTAZAR on Yaz Christian  being transferred to room 314 for routine progression of care       Report consisted of patients Situation, Background, Assessment and   Recommendations(SBAR). Information from the following report(s) SBAR was reviewed with the receiving nurse. Lines:   Peripheral IV 10/18/20 Left Antecubital (Active)   Site Assessment Clean, dry, & intact 10/18/20 0113   Phlebitis Assessment 0 10/18/20 0113   Infiltration Assessment 0 10/18/20 0113   Dressing Status Clean, dry, & intact 10/18/20 0113   Hub Color/Line Status Green 10/18/20 0113   Alcohol Cap Used No 10/18/20 0113       Peripheral IV 10/18/20 Right Hand (Active)   Site Assessment Clean, dry, & intact 10/18/20 0446   Phlebitis Assessment 0 10/18/20 0446   Infiltration Assessment 0 10/18/20 0446   Dressing Status Clean, dry, & intact 10/18/20 0446   Hub Color/Line Status Blue 10/18/20 0446        Opportunity for questions and clarification was provided.       Patient transported with:   Registered Nurse

## 2020-10-18 NOTE — PROGRESS NOTES
10/18/20 0500   Dual Skin Pressure Injury Assessment   Dual Skin Pressure Injury Assessment WDL   Second Care Provider (Based on [address] [place] [address]) Yolis RN   Skin Integumentary   Skin Integumentary (WDL) X    Pressure  Injury Documentation No Pressure Injury Noted-Pressure Ulcer Prevention Initiated

## 2020-10-18 NOTE — PROGRESS NOTES
Care Management Interventions  PCP Verified by CM: Yes  Current Support Network: Lives with Spouse  Discharge Location  Discharge Placement: Home with family assistance  77 yr-old  male to ED after unresponsiveness. Lives at home with spouse. Wheelchair bound due to hx spinal surgeries. Dx UTI. Able to move and feel legs. Foot drop present, patient states that he cannot bear weight due to previous surgeries. Here in 2017 and had [facility]. Will follow for discharge planning.

## 2020-10-18 NOTE — ED NOTES
EMS was called out for altered mental status. On arrival found the pt awake and alert. BP was low and he was given some fluid which his BP responded well.   Pt remains alert and oriented x4

## 2020-10-18 NOTE — PROGRESS NOTES
Admission assessment complete. Patient is alert and oriented. Respirations are even and unlabored, bowel sounds are active. Patient states that he can not recall when his last bowel movement was, but it was \"not long ago\". Patient is wheelchair bound but is able to move and feel legs. Foot drop present, patient states that he can not bear weight due to previous surgeries. Legs are cool to touch. Vital signs are within normal range. Normal saline infusing at 100 ml/hr per order. No needs expressed at this time. Bed low and locked, call light within reach.

## 2020-10-18 NOTE — ED TRIAGE NOTES
Pt to ED from home via EMS after the wife found him unresponsive. 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 EMS arrived and the pt was hypotensive with BP in the 70/XX after bolus 80/60's. Pt is alert and oriented x 4 on arrival to the ED. Pt is wheelchair bound at home. Pt lives at home with wife and she is control of his medications. Pt wearing mask prior to arrival to the ED.

## 2020-10-18 NOTE — ED PROVIDER NOTES
Patient is 50-year-old male presenting emerge department day secondary to being found unresponsive by his wife. Wife says that they were watching TV and she looked over and try to talk to him and he was able to answer her but said that he did not feel well. He said that he just wanted to go to bed but she was concerned and called 911. The patient was responsive to painful stimuli when EMS arrived and was noted to be hypotensive and bradycardic. They did start an IV and give the patient a liter of IV normal saline and he immediately started talking and became appropriate. The patient says he does not drink much water. He is wheelchair-bound secondary to multiple spinal surgeries. The patient has no acute complaint currently. Past Medical History:   Diagnosis Date    Allergic rhinitis, cause unspecified 3/8/2013    CAD (coronary artery disease) 1/3/2013    MI age 62- no intervention; affirms SOB with 1 flight of steps    Cancer [facility])     Chest pain     Chronic pain     lumbar pain    Depression     Dizziness 3/8/2013    Dyslipidemia     Esophageal stenosis 8/16/2013    dxed on EGD early 2000's. Never dilated    Esophagitis 1/3/2013    GERD with esophagitis     not completely controlled with zantac    HTN (hypertension) 1/3/2013    controlled with med    Kidney stones 1/3/2013    Narcolepsy 1/3/2013    Prostate cancer ([address] 1/3/2013    Vertigo, benign positional        Past Surgical History:   Procedure Laterality Date    HX CARPAL TUNNEL RELEASE      HX CHOLECYSTECTOMY  2008?  HX LITHOTRIPSY      HX LUMBAR LAMINECTOMY      x6 Dr. Antonio Santiago HX ORTHOPAEDIC  2004    carpel tunnel/left hand    HX ORTHOPAEDIC  1995    carpel tunnel/right hand    HX PROSTATECTOMY  2008?     Dr. Nolan Goodell      Laminectomy         Family History:   Problem Relation Age of Onset    Depression Mother     Alcohol abuse Father     Depression Sister     Depression Brother     Depression Brother     Depression Sister     Depression Sister        Social History     Socioeconomic History    Marital status:      Spouse name: Not on file    Number of children: Not on file    Years of education: Not on file    Highest education level: Not on file   Occupational History    Not on file   Social Needs    Financial resource strain: Not on file    Food insecurity     Worry: Not on file     Inability: Not on file   [employer] needs     Medical: Not on file     Non-medical: Not on file   Tobacco Use    Smoking status: Never Smoker    Smokeless tobacco: Never Used   Substance and Sexual Activity    Alcohol use: No    Drug use: No    Sexual activity: Not on file   Lifestyle    Physical activity     Days per week: Not on file     Minutes per session: Not on file    Stress: Not on file   Relationships    Social connections     Talks on phone: Not on file     Gets together: Not on file     Attends Christian service: Not on file     Active member of club or organization: Not on file     Attends meetings of clubs or organizations: Not on file     Relationship status: Not on file    Intimate partner violence     Fear of current or ex partner: Not on file     Emotionally abused: Not on file     Physically abused: Not on file     Forced sexual activity: Not on file   Other Topics Concern     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     Review of Systems Constitutional: Positive for fatigue. All other systems reviewed and are negative. Vitals:    10/18/20 0140 10/18/20 0150 10/18/20 0233 10/18/20 0300   BP: 107/66  124/80 (!) 148/83   Pulse: (!) 52  (!) 53 (!) 55   Resp:   12 16   Temp:  98.7 °F (37.1 °C)     SpO2: 95%  100% 98%   Weight:       Height:                Physical Exam     GENERAL:The patient has Body mass index is 22.96 kg/m². Well-hydrated. No acute distress. VITAL SIGNS: Heart rate, blood pressure, respiratory rate reviewed as recorded in  nurse's notes  EYES: Pupils reactive. Extraocular motion intact. No conjunctival redness or drainage. EARS: No external masses or lesions. NOSE: No nasal drainage or epistaxis. MOUTH/THROAT: Pharynx clear; airway patent. NECK: Supple, no meningeal signs. Trachea midline. No masses or thyromegaly. LUNGS: Breath sounds clear and equal bilaterally no accessory muscle use  CHEST: No deformity  CARDIOVASCULAR: Regular rate and rhythm  ABDOMEN: Soft without tenderness. No palpable masses or organomegaly. No  peritoneal signs. No rigidity. EXTREMITIES: No clubbing or cyanosis. No joint swelling. Normal muscle tone. No  restricted range of motion appreciated. NEUROLOGIC: Cranial nerve exam reveals face is symmetrical, tongue is midline speech is clear. Decreased strength in lower extremities bilaterally. SKIN: No rash or petechiae. Good skin turgor palpated. PSYCHIATRIC: Alert and oriented. Appropriate behavior and judgment.       MDM  Number of Diagnoses or Management Options  Diagnosis management comments: Medication related, infection, hyponatremia, hypoglycemia, hyperthyroidism, hypertensive encephalopathy,  hypercarbia, herniated disc, electrolyte imbalance, electrolyte abnormality, drug toxicity, depression, dementia, dehydration, calm acute  migraine,CNS vasculitis, CNS infection, carotid artery dissection, psychiatric illness         Amount and/or Complexity of Data Reviewed  Clinical lab tests: reviewed and ordered  Tests in the radiology section of CPT®: reviewed and ordered  Tests in the medicine section of CPT®: ordered and reviewed  Obtain history from someone other than the patient: yes  Review and summarize past medical records: yes  Independent visualization of images, tracings, or specimens: yes      ED Course as of Oct 18 0404   Sun Oct 18, 2020   0152 IMPRESSION: No acute cardiopulmonary disease.    XR CHEST PORT [KH]   0241 Lactic acid(!!): 3.2 [KH]   0242 Troponin-High Sensitivity(!!): 57.6 [KH]   0402 I have updated the patient's wife about the findings in the emergency department and she is agreeable with the admission    [KH]   0403 Case discussed with Dr. Gordon Sampson the hospitalist and she will come and see the patient and plan on admitting him to the hospital.    [KH]      ED Course User Index  [KH] Jazmyn Schwartz, DO       Procedures

## 2020-10-18 NOTE — H&P
HOSPITALIST H&P  NAME:  Gracie Mukherjee   Age:  77 y.o.  :   1945   MRN:   [id number]  PCP: Hannah Lopez MD  Treatment Team: Attending Provider: Tavia Royal MD; Primary Nurse: Augustine Wood RN    Prior     CC: Reason for admission is: UTI w hypotension    HPI:   Patient history was obtained from the ER provider prior to seeing the patient. Patient is a 77 y.o. male who presents to the ER due to having an unresponsive episode at home. His wife reported to the ER that they were watching TV and she looked over and noticed that he did not look well and said he did not feel well. After that he became unresponsive even to painful stimuli, she called EMS. EMS reports that he was responsive to painful stimuli for them but he was noted to be hypotensive and bradycardic. He was given 1 L of IV fluids in route and responded to that very well. He was able to start talking and became much more alert and appropriate. He does report that he does not tend to drink much water or fluids. He is wheelchair-bound and disabled due to spinal surgeries. He currently does not have any complaints. He denies any fever/chills, NVD, abdominal pain, chest pain, cough, shortness of breath, headaches, dysuria. ER work-up shows a probable UTI. There is some lactic acidosis as well. We are asked to admit for further management and observation due to the severity of his initial presentation. ROS:  All systems have been reviewed and are negative except as stated in HPI or elsewhere. Past Medical History:   Diagnosis Date    Allergic rhinitis, cause unspecified 3/8/2013    Backache 2013      The patient is stable on the current treatment. Tolerating well. No sides effects. Will not adjust at this time.      CAD (coronary artery disease) 1/3/2013    MI age 62- no intervention; affirms SOB with 1 flight of steps    [facility] ([facility] [address]     Cardiomegaly 3/14/2017    Cervical spinal stenosis 10/3/2016    Followed by Dr. Jaya Macedo. Last Assessment & Plan:  MRI discussed with the patient, patient is following with Dr. Juan Miguel Avina for further assessment and consideration of surgery.  Chest pain     Chronic bilateral low back pain with right-sided sciatica 2/27/2019    Chronic pain     lumbar pain    Chronic right SI joint pain 2/27/2019    Depression     Dizziness 3/8/2013    Dyslipidemia     Esophageal stenosis 8/16/2013    dxed on EGD early 2000's. Never dilated    Esophagitis 1/3/2013    GERD with esophagitis     not completely controlled with zantac    HTN (hypertension) 1/3/2013    controlled with med    Kidney stones 1/3/2013    Narcolepsy 1/3/2013    Prostate cancer ([address] 1/3/2013    Vertigo, benign positional       Past Surgical History:   Procedure Laterality Date    HX CARPAL TUNNEL RELEASE      HX CHOLECYSTECTOMY  2008?  HX LITHOTRIPSY      HX LUMBAR LAMINECTOMY      x6 Dr. Sharla Lara HX ORTHOPAEDIC  2004    carpel tunnel/left hand    HX ORTHOPAEDIC  1995    carpel tunnel/right hand    HX PROSTATECTOMY  2008? Dr. Hart ezequiel      Laminectomy      Social History     Tobacco Use    Smoking status: Never Smoker    Smokeless tobacco: Never Used   Substance Use Topics    Alcohol use: No      Family History   Problem Relation Age of Onset    Depression Mother     Alcohol abuse Father     Depression Sister     Depression Brother     Depression Brother     Depression Sister     Depression Sister        FH Reviewed and non-contributory to admitting diagnosis    No Known Allergies   Prior to Admission Medications   Prescriptions Last Dose Informant Patient Reported? Taking? FLUAD [id number], 66 YR UP,,PF, syrg injection   Yes No   HYDROcodone-acetaminophen (NORCO) 7.5-325 mg per tablet   No No   Sig: Take 1 Tab by mouth every eight (8) hours as needed for Pain for up to 30 days. Max Daily Amount: 3 Tabs.  prn severe pain for June 2019 HYDROcodone-acetaminophen (NORCO) 7.5-325 mg per tablet   No No   Sig: Take 1 Tab by mouth every eight (8) hours as needed for Pain for up to 30 days. Max Daily Amount: 3 Tabs. prn severe pain for May 2019   HYDROcodone-acetaminophen (NORCO) 7.5-325 mg per tablet   No No   Sig: Take 1 Tab by mouth every eight (8) hours as needed for Pain for up to 30 days. Max Daily Amount: 3 Tabs. prn severe pain for 2019   LORazepam (ATIVAN) 1 mg tablet   Yes No   Si tab po 30 minutes prior to MRI, 1 tab po prn for claustrophobia   OTHER   No No   Sig: Knee High compression hose or socks  (20 - 30 mmHg if possible) Wear Daily Dx:   ( vericose veins, edema - R60.9)   OTHER   No No   Sig: Disabled placard - This is to certify that this patient has an inability to ordinarily walk 100 feet nonstop without aggravating an existing medical condition, including the increase of pain. OTHER   No No   Sig: Disabled placard - This is to certify that this patient has an inability to ordinarily walk 100 feet nonstop without aggravating an existing medical condition, including the increase of pain. OTHER   No No   Sig: Ramp - needed for entrance to home  Dx:  Frequent falls R29.6, gait instability  R26.81   amLODIPine (NORVASC) 10 mg tablet   No No   Sig: Take 1 by mouth daily. baclofen (LIORESAL) 10 mg tablet   No No   Si-2 po TID prn muscle spasm - causes sleepiness   benazepril-hydroCHLOROthiazide (LOTENSIN HCT) 20-25 mg per tablet   No No   Si po qam for blood pressure instead of the lotensin   busPIRone (BUSPAR) 15 mg tablet   No No   Si/2 - 1 po TID for anxiety   cyclobenzaprine (FLEXERIL) 10 mg tablet   No No   Sig: Take 1 Tab by mouth three (3) times daily as needed for Muscle Spasm(s).    dextroamphetamine-amphetamine (ADDERALL) 30 mg tablet   No No   Si po TID for 2019  Indications: Recurring Sleep Episodes During the Day   dextroamphetamine-amphetamine (ADDERALL) 30 mg tablet   No No   Si po TID for May 2019  Indications: Recurring Sleep Episodes During the Day   dextroamphetamine-amphetamine (ADDERALL) 30 mg tablet   No No   Si po tid for 2019  Indications: Recurring Sleep Episodes During the Day   gabapentin (NEURONTIN) 100 mg capsule   No No   Sig: Take  by mouth three (3) times daily. Patient taking differently: Take 100 mg by mouth daily as needed. Take  by mouth three (3) times daily. loratadine (CLARITIN) 10 mg tablet   No No   Sig: Take 1 Tab by mouth daily. lovastatin (MEVACOR) 20 mg tablet   No No   Sig: Take 1 Tab by mouth nightly. meclizine (ANTIVERT) 25 mg tablet   No No   Sig: Take 1 Tab by mouth three (3) times daily as needed for Dizziness or Nausea for up to 30 doses. metoprolol succinate (TOPROL-XL) 50 mg XL tablet   No No   Sig: TAKE ONE TABLET BY MOUTH EVERY DAY   mirabegron ER (MYRBETRIQ) 50 mg ER tablet   No No   Si po qd for bladder instead of tolterodine   naloxone (NARCAN) 0.4 mg/mL injection   No No   Si injection at first sign of over dose, may repeat every 2-3 min. Call 911.   nitroglycerin (NITROSTAT) 0.4 mg SL tablet   No No   Si Tab by SubLINGual route every five (5) minutes as needed for Chest Pain. X3. If unrelieved call 911   omeprazole (PRILOSEC) 40 mg capsule   No No   Sig: Take 1 Cap by mouth daily. potassium chloride SR (KLOR-CON 10) 10 mEq tablet   No No   Sig: Take 2 p.o. daily as needed   promethazine (PHENERGAN) 25 mg tablet   No No   Sig: Take 1 Tab by mouth every six (6) hours as needed for Nausea. raNITIdine (ZANTAC) 300 mg tab   No No   Sig: TAKE ONE TABLET BY MOUTH EVERY DAY   sertraline (ZOLOFT) 100 mg tablet   No No   Sig: Take 2 Tabs by mouth daily. depression dx:F32.9   tolterodine (DETROL) 2 mg tablet   No No   Sig: Take 1 Tab by mouth two (2) times a day.       Facility-Administered Medications: None         Objective:     No intake or output data in the 24 hours ending 10/18/20 0458   Temp (24hrs), Av.7 °F (37.1 °C), Min:98.7 °F (37.1 °C), Max:98.7 °F (37.1 °C)    Oxygen Therapy  O2 Sat (%): 98 % (10/18/20 0430)  Pulse via Oximetry: 65 beats per minute (10/18/20 0430)  O2 Device: Room air (10/18/20 0103)   Body mass index is 22.96 kg/m². Patient Vitals for the past 24 hrs:   Temp Pulse Resp BP SpO2   10/18/20 0430  62 16 133/75 98 %   10/18/20 0400  63 16 124/76 94 %   10/18/20 0330  60 24 125/75 100 %   10/18/20 0300  (!) 55 16 (!) 148/83 98 %   10/18/20 0233  (!) 53 12 124/80 100 %   10/18/20 0150 98.7 °F (37.1 °C)       10/18/20 0140  (!) 52  107/66 95 %   10/18/20 0139  (!) 56 16     10/18/20 0103  (!) 56 24 (!) 84/56 99 %     Physical Exam:    General:    WD and WN, No apparent distress. Head:   Normocephalic, without obvious abnormality, atraumatic. Eyes:  PERRL; EOMI; sclera normal/non-icteric  ENT:  Hearing is normal.  Oropharynx is clear with tacky mucous membranes   Resp:    Clear to auscultation bilaterally. No Wheezing or Rhonchi. Resp are even and unlabored  Heart[de-identified]  Regular rate and rhythm,  no murmur,   No LE edema  Abdomen:   Soft, non-tender. Not distended. Bowel sounds normal.  hepato-splenomegaly-none  Musc/SK: Muscle strength is good and tone normal; No cyanosis. No clubbing  Skin:     Texture, turgor normal. No significant rashes or lesions.   Capillary refill < 2 sec  Neurologic: CN II - XII are grossly intact - Eye exam as noted above  Psych: Alert and oriented x 4;  Judgement and insight are normal     Data Review:   Recent Results (from the past 24 hour(s))   CBC WITH AUTOMATED DIFF    Collection Time: 10/18/20  1:56 AM   Result Value Ref Range    WBC 7.8 4.3 - 11.1 K/uL    RBC 4.17 (L) 4.23 - 5.6 M/uL    HGB 12.1 (L) 13.6 - 17.2 g/dL    HCT 38.6 (L) 41.1 - 50.3 %    MCV 92.6 79.6 - 97.8 FL    MCH 29.0 26.1 - 32.9 PG    MCHC 31.3 (L) 31.4 - 35.0 g/dL    RDW 15.3 (H) 11.9 - 14.6 %    PLATELET 087 929 - 779 K/uL    MPV 9.7 9.4 - 12.3 FL    ABSOLUTE NRBC 0.00 0.0 - 0.2 K/uL DF AUTOMATED      NEUTROPHILS 74 43 - 78 %    LYMPHOCYTES 14 13 - 44 %    MONOCYTES 8 4.0 - 12.0 %    EOSINOPHILS 3 0.5 - 7.8 %    BASOPHILS 0 0.0 - 2.0 %    IMMATURE GRANULOCYTES 0 0.0 - 5.0 %    ABS. NEUTROPHILS 5.8 1.7 - 8.2 K/UL    ABS. LYMPHOCYTES 1.1 0.5 - 4.6 K/UL    ABS. MONOCYTES 0.6 0.1 - 1.3 K/UL    ABS. EOSINOPHILS 0.2 0.0 - 0.8 K/UL    ABS. BASOPHILS 0.0 0.0 - 0.2 K/UL    ABS. IMM. GRANS. 0.0 0.0 - 0.5 K/UL   METABOLIC PANEL, COMPREHENSIVE    Collection Time: 10/18/20  1:56 AM   Result Value Ref Range    Sodium 141 136 - 145 mmol/L    Potassium 4.7 3.5 - 5.1 mmol/L    Chloride 109 (H) 98 - 107 mmol/L    CO2 27 21 - 32 mmol/L    Anion gap 5 (L) 7 - 16 mmol/L    Glucose 119 (H) 65 - 100 mg/dL    BUN 29 (H) 8 - 23 MG/DL    Creatinine 1.43 0.8 - 1.5 MG/DL    GFR est AA >60 >60 ml/min/1.73m2    GFR est non-AA 51 (L) >60 ml/min/1.73m2    Calcium 9.0 8.3 - 10.4 MG/DL    Bilirubin, total 0.3 0.2 - 1.1 MG/DL    ALT (SGPT) 12 12 - 65 U/L    AST (SGOT) 12 (L) 15 - 37 U/L    Alk.  phosphatase 98 50 - 136 U/L    Protein, total 6.7 6.3 - 8.2 g/dL    Albumin 3.5 3.2 - 4.6 g/dL    Globulin 3.2 2.3 - 3.5 g/dL    A-G Ratio 1.1 (L) 1.2 - 3.5     TROPONIN-HIGH SENSITIVITY    Collection Time: 10/18/20  1:56 AM   Result Value Ref Range    Troponin-High Sensitivity 57.6 (HH) 0 - 14 pg/mL   LACTIC ACID    Collection Time: 10/18/20  1:57 AM   Result Value Ref Range    Lactic acid 3.2 (HH) 0.4 - 2.0 MMOL/L   URINALYSIS W/ RFLX MICROSCOPIC    Collection Time: 10/18/20  3:27 AM   Result Value Ref Range    Color HUSSEIN      Appearance TURBID      Specific gravity 1.023 1.001 - 1.023      pH (UA) 5.5 5.0 - 9.0      Protein 100 (A) NEG mg/dL    Glucose Negative mg/dL    Ketone TRACE (A) NEG mg/dL    Bilirubin SMALL (A) NEG      Blood LARGE (A) NEG      Urobilinogen 1.0 0.2 - 1.0 EU/dL    Nitrites Negative NEG      Leukocyte Esterase MODERATE (A) NEG      WBC 20-50 0 /hpf    RBC 20-50 0 /hpf    Epithelial cells 5-10 0 /hpf Bacteria TRACE 0 /hpf    Casts 20-50 0 /lpf    Crystals, urine OCCASIONAL 0 /LPF    Mucus 4+ (H) 0 /lpf     CXR Results  (Last 48 hours)               10/18/20 0135  XR CHEST PORT Final result    Impression:  IMPRESSION: No acute cardiopulmonary disease. Narrative:  EXAM: XR CHEST PORT       INDICATION: Transient alteration of awareness. COMPARISON: 3/23/2019       FINDINGS: A portable AP radiograph of the chest was obtained at hours. The   patient is on a cardiac monitor. The lungs are clear. The cardiac and   mediastinal contours and pulmonary vascularity are normal.  Status post   instrumented thoracic spine fusion. .                CT Results  (Last 48 hours)    None              Assessment and Plan: Active Hospital Problems    Diagnosis Date Noted    UTI (urinary tract infection) 10/18/2020    HTN (hypertension) 01/03/2013     The patient is stable on the current treatment. Tolerating well. No sides effects. Will not adjust at this time. Principal Problem:    UTI (urinary tract infection) (10/18/2020)    Cont IV rocephin; await C&S; IVF    Active Problems:    HTN (hypertension) (1/3/2013)    Hold HTN medications due to profound hypotension at time of EMS arrival and still low in ER. Resume as needed    Hypotension:  Improved with fluids; etiology unclear, infection, dehydration, or medications      · PLAN General  · DVT prophylaxis:  Lovenox  · Code status: Full;  HCPOA:   · Risk: high  · Anticipated DC needs:  · Estimated LOS:  Greater than 2 midnights  · Plans discussed with patient and/or caregiver; questions answered. Parts of this document were created using dragon voice recognition software.  The chart has been reviewed but errors may still be present    Med records reviewed if applicable; findings:     Critical care time if applicable:      Signed By: Peter Chaney MD     October 18, 2020

## 2020-10-19 VITALS
HEART RATE: 65 BPM | SYSTOLIC BLOOD PRESSURE: 159 MMHG | RESPIRATION RATE: 18 BRPM | OXYGEN SATURATION: 95 % | HEIGHT: 70 IN | DIASTOLIC BLOOD PRESSURE: 87 MMHG | TEMPERATURE: 98 F | WEIGHT: 160 LBS | BODY MASS INDEX: 22.9 KG/M2

## 2020-10-19 LAB
ALBUMIN SERPL-MCNC: 3.1 G/DL (ref 3.2–4.6)
ALBUMIN/GLOB SERPL: 1 {RATIO} (ref 1.2–3.5)
ALP SERPL-CCNC: 109 U/L (ref 50–136)
ALT SERPL-CCNC: 9 U/L (ref 12–65)
ANION GAP SERPL CALC-SCNC: 4 MMOL/L (ref 7–16)
AST SERPL-CCNC: 15 U/L (ref 15–37)
BASOPHILS # BLD: 0 K/UL (ref 0–0.2)
BASOPHILS NFR BLD: 1 % (ref 0–2)
BILIRUB SERPL-MCNC: 0.2 MG/DL (ref 0.2–1.1)
BUN SERPL-MCNC: 25 MG/DL (ref 8–23)
CALCIUM SERPL-MCNC: 8.6 MG/DL (ref 8.3–10.4)
CHLORIDE SERPL-SCNC: 111 MMOL/L (ref 98–107)
CO2 SERPL-SCNC: 27 MMOL/L (ref 21–32)
CREAT SERPL-MCNC: 1.17 MG/DL (ref 0.8–1.5)
DIFFERENTIAL METHOD BLD: ABNORMAL
EOSINOPHIL # BLD: 0.1 K/UL (ref 0–0.8)
EOSINOPHIL NFR BLD: 3 % (ref 0.5–7.8)
ERYTHROCYTE [DISTWIDTH] IN BLOOD BY AUTOMATED COUNT: 15.3 % (ref 11.9–14.6)
GLOBULIN SER CALC-MCNC: 3 G/DL (ref 2.3–3.5)
GLUCOSE SERPL-MCNC: 84 MG/DL (ref 65–100)
HCT VFR BLD AUTO: 32.8 % (ref 41.1–50.3)
HGB BLD-MCNC: 10.4 G/DL (ref 13.6–17.2)
IMM GRANULOCYTES # BLD AUTO: 0 K/UL (ref 0–0.5)
IMM GRANULOCYTES NFR BLD AUTO: 0 % (ref 0–5)
LYMPHOCYTES # BLD: 1.6 K/UL (ref 0.5–4.6)
LYMPHOCYTES NFR BLD: 41 % (ref 13–44)
MCH RBC QN AUTO: 28.8 PG (ref 26.1–32.9)
MCHC RBC AUTO-ENTMCNC: 31.7 G/DL (ref 31.4–35)
MCV RBC AUTO: 90.9 FL (ref 79.6–97.8)
MONOCYTES # BLD: 0.5 K/UL (ref 0.1–1.3)
MONOCYTES NFR BLD: 12 % (ref 4–12)
NEUTS SEG # BLD: 1.8 K/UL (ref 1.7–8.2)
NEUTS SEG NFR BLD: 44 % (ref 43–78)
NRBC # BLD: 0 K/UL (ref 0–0.2)
PLATELET # BLD AUTO: 160 K/UL (ref 150–450)
PMV BLD AUTO: 10.1 FL (ref 9.4–12.3)
POTASSIUM SERPL-SCNC: 4.3 MMOL/L (ref 3.5–5.1)
PROT SERPL-MCNC: 6.1 G/DL (ref 6.3–8.2)
RBC # BLD AUTO: 3.61 M/UL (ref 4.23–5.6)
SODIUM SERPL-SCNC: 142 MMOL/L (ref 136–145)
WBC # BLD AUTO: 4 K/UL (ref 4.3–11.1)

## 2020-10-19 PROCEDURE — 74011250637 HC RX REV CODE- 250/637: Performed by: FAMILY MEDICINE

## 2020-10-19 PROCEDURE — 99218 HC RM OBSERVATION: CPT

## 2020-10-19 PROCEDURE — 96376 TX/PRO/DX INJ SAME DRUG ADON: CPT

## 2020-10-19 PROCEDURE — 74011000258 HC RX REV CODE- 258: Performed by: FAMILY MEDICINE

## 2020-10-19 PROCEDURE — 85025 COMPLETE CBC W/AUTO DIFF WBC: CPT

## 2020-10-19 PROCEDURE — 80053 COMPREHEN METABOLIC PANEL: CPT

## 2020-10-19 PROCEDURE — 2709999900 HC NON-CHARGEABLE SUPPLY

## 2020-10-19 PROCEDURE — 74011250636 HC RX REV CODE- 250/636: Performed by: FAMILY MEDICINE

## 2020-10-19 PROCEDURE — 96372 THER/PROPH/DIAG INJ SC/IM: CPT

## 2020-10-19 PROCEDURE — 90686 IIV4 VACC NO PRSV 0.5 ML IM: CPT | Performed by: FAMILY MEDICINE

## 2020-10-19 PROCEDURE — 36415 COLL VENOUS BLD VENIPUNCTURE: CPT

## 2020-10-19 PROCEDURE — 90471 IMMUNIZATION ADMIN: CPT

## 2020-10-19 RX ORDER — CEFDINIR 300 MG/1
300 CAPSULE ORAL 2 TIMES DAILY
Qty: 6 CAP | Refills: 0 | Status: SHIPPED | OUTPATIENT
Start: 2020-10-19 | End: 2020-10-22

## 2020-10-19 RX ADMIN — CEFTRIAXONE 1 G: 1 INJECTION, POWDER, FOR SOLUTION INTRAMUSCULAR; INTRAVENOUS at 05:33

## 2020-10-19 RX ADMIN — DEXTROAMPHETAMINE SACCHARATE, AMPHETAMINE ASPARTATE, DEXTROAMPHETAMINE SULFATE AND AMPHETAMINE SULFATE 30 MG: 3.75; 3.75; 3.75; 3.75 TABLET ORAL at 07:55

## 2020-10-19 RX ADMIN — ACETAMINOPHEN 650 MG: 325 TABLET, FILM COATED ORAL at 08:04

## 2020-10-19 RX ADMIN — ENOXAPARIN SODIUM 40 MG: 40 INJECTION SUBCUTANEOUS at 08:00

## 2020-10-19 RX ADMIN — BUSPIRONE HYDROCHLORIDE 15 MG: 5 TABLET ORAL at 07:55

## 2020-10-19 RX ADMIN — GABAPENTIN 300 MG: 300 CAPSULE ORAL at 07:55

## 2020-10-19 RX ADMIN — TAMSULOSIN HYDROCHLORIDE 0.8 MG: 0.4 CAPSULE ORAL at 07:55

## 2020-10-19 RX ADMIN — SODIUM CHLORIDE 100 ML/HR: 900 INJECTION, SOLUTION INTRAVENOUS at 01:36

## 2020-10-19 RX ADMIN — CELECOXIB 200 MG: 200 CAPSULE ORAL at 07:54

## 2020-10-19 RX ADMIN — Medication 5 ML: at 05:34

## 2020-10-19 RX ADMIN — INFLUENZA VIRUS VACCINE 0.5 ML: 15; 15; 15; 15 SUSPENSION INTRAMUSCULAR at 12:06

## 2020-10-19 RX ADMIN — SERTRALINE HYDROCHLORIDE 100 MG: 100 TABLET ORAL at 07:55

## 2020-10-19 NOTE — DISCHARGE SUMMARY
Hospitalist Discharge Summary     Admit Date:  10/18/2020  1:02 AM   DC note date: 10/19/2020  Name:  Kristi Crowell   Age:  77 y.o.  :  1945   MRN:  [id number]   PCP:  Jasiel Jimenez MD  Treatment Team: Attending Provider: Anais Samayoa MD; Care Manager: April Gordon LMSW; Utilization Review: Leonardo Labs, RN    Problem List for this Hospitalization:  Hospital Problems as of 10/19/2020 Date Reviewed: 2019          Codes Class Noted - Resolved POA    * (Principal) UTI (urinary tract infection) ICD-10-CM: N39.0  ICD-9-CM: 599.0  10/18/2020 - Present Yes        Hypotension ICD-10-CM: I95.9  ICD-9-CM: 458.9  10/18/2020 - Present Yes        Lactic acidosis ICD-10-CM: E87.2  ICD-9-CM: 276.2  10/18/2020 - Present Yes        HTN (hypertension) ICD-10-CM: I10  ICD-9-CM: 401.9  1/3/2013 - Present Yes    Overview Signed 2013  2:41 PM by Jasiel Jimenez MD     The patient is stable on the current treatment. Tolerating well. No sides effects. Will not adjust at this time. Admission HPI from 10/18/2020 /hospital course:    Patient is a 77 y.o. male who presents to the ER due to having an unresponsive episode at home. His wife reported to the ER that they were watching TV and she looked over and noticed that he did not look well and said he did not feel well. After that he became unresponsive even to painful stimuli, she called EMS. EMS reports that he was responsive to painful stimuli for them but he was noted to be hypotensive and bradycardic. He was given 1 L of IV fluids in route and responded to that very well. He was able to start talking and became much more alert and appropriate. He does report that he does not tend to drink much water or fluids. He is wheelchair-bound and disabled due to spinal surgeries. He currently does not have any complaints. He denies any fever/chills, NVD, abdominal pain, chest pain, cough, shortness of breath, headaches, dysuria.   ER work-up shows a probable UTI. There is some lactic acidosis as well. We are asked to admit for further management and observation due to the severity of his initial presentation. Patient responded well to the IV fluid and antibiotics. Urine culture showed skin faviola preliminary. Blood culture till date negative. Lactic acidosis resolved. Patient back to baseline and no active complaint. Disposition: Home or Self Care  Activity: Activity as tolerated  Diet: DIET REGULAR  Code Status: Full Code    Follow Up Orders: Follow-up Appointments   Procedures    FOLLOW UP VISIT Appointment in: 3 - 5 Days PCP     PCP     Standing Status:   Standing     Number of Occurrences:   1     Order Specific Question:   Appointment in     Answer:   3 - 5 Days       Follow-up Information     Follow up With Specialties Details Why Contact Info    Larry Blair MD Family Medicine   [address] [facility]  [phone]            Discharge meds at bottom of this note. Plan was discussed with patient. All questions answered. Patient was stable at time of discharge. Given instructions to call a physician or return if any concerns. Discharge summary and encounter summary was sent to PCP electronically via \"Comm Mgt\" link in [facility], if possible. Diagnostic Imaging/Tests:   Xr Chest Port    Result Date: 10/18/2020  EXAM: XR CHEST PORT INDICATION: Transient alteration of awareness. COMPARISON: 3/23/2019 FINDINGS: A portable AP radiograph of the chest was obtained at hours. The patient is on a cardiac monitor. The lungs are clear. The cardiac and mediastinal contours and pulmonary vascularity are normal.  Status post instrumented thoracic spine fusion. .     IMPRESSION: No acute cardiopulmonary disease. Echocardiogram results:  No results found for this visit on 10/18/20.     Procedures done this admission:  * No surgery found *    All Micro Results     Procedure Component Value Units Date/Time CULTURE, BLOOD [[id number]] Collected:  10/18/20 0156    Order Status:  Completed Specimen:  Blood Updated:  10/19/20 0717     Special Requests: --        LEFT  Antecubital       Culture result: NO GROWTH 1 DAY       CULTURE, BLOOD [[id number]] Collected:  10/18/20 0126    Order Status:  Completed Specimen:  Blood Updated:  10/19/20 0717     Special Requests: --        RIGHT  HAND       Culture result: NO GROWTH 1 DAY       CULTURE, URINE [[id number]] Collected:  10/18/20 0327    Order Status:  Completed Specimen:  Cath Urine Updated:  10/19/20 0651     Special Requests: NO SPECIAL REQUESTS        Culture result:       <10,000 COLONIES/mL MIXED SKIN MARCI ISOLATED                [unfilled]    Labs: Results:       BMP, Mg, Phos Recent Labs     10/19/20  0337 10/18/20  0156    141   K 4.3 4.7   * 109*   CO2 27 27   AGAP 4* 5*   BUN 25* 29*   CREA 1.17 1.43   CA 8.6 9.0   GLU 84 119*      CBC Recent Labs     10/19/20  0337 10/18/20  0156   WBC 4.0* 7.8   RBC 3.61* 4.17*   HGB 10.4* 12.1*   HCT 32.8* 38.6*    186   GRANS 44 74   LYMPH 41 14   EOS 3 3   MONOS 12 8   BASOS 1 0   IG 0 0   ANEU 1.8 5.8   ABL 1.6 1.1   ANGELES 0.1 0.2   ABM 0.5 0.6   ABB 0.0 0.0   AIG 0.0 0.0      LFT Recent Labs     10/19/20  0337 10/18/20  0156   ALT 9* 12    98   TP 6.1* 6.7   ALB 3.1* 3.5   GLOB 3.0 3.2   AGRAT 1.0* 1.1*      Cardiac Testing Lab Results   Component Value Date/Time    BNP 18 05/26/2017 03:30 AM    BNP 9 03/13/2015 01:35 AM    Troponin-I, Qt. 0.06 (H) 10/03/2016 03:58 PM    Troponin-I, Qt. 0.05 10/19/2014 04:20 PM      Coagulation Tests No results found for: PTP, INR, APTT, INREXT   A1c Lab Results   Component Value Date/Time    Hemoglobin A1c 5.6 10/19/2018 03:50 PM    Hemoglobin A1c CANCELED 10/12/2018 03:05 PM      Lipid Panel Lab Results   Component Value Date/Time    Cholesterol, total 120 10/19/2018 03:50 PM    HDL Cholesterol 29 (L) 10/19/2018 03:50 PM    LDL, calculated 56 10/19/2018 03:50 PM VLDL, calculated 35 10/19/2018 03:50 PM    Triglyceride 174 (H) 10/19/2018 03:50 PM      Thyroid Panel Lab Results   Component Value Date/Time    TSH 1.610 10/19/2018 03:50 PM    TSH CANCELED 10/12/2018 03:05 PM        Most Recent UA Lab Results   Component Value Date/Time    Color HUSSEIN 10/18/2020 03:27 AM    Appearance TURBID 10/18/2020 03:27 AM    Specific gravity 1.023 10/18/2020 03:27 AM    pH (UA) 5.5 10/18/2020 03:27 AM    Protein 100 (A) 10/18/2020 03:27 AM    Glucose Negative 10/18/2020 03:27 AM    Ketone TRACE (A) 10/18/2020 03:27 AM    Bilirubin SMALL (A) 10/18/2020 03:27 AM    Blood LARGE (A) 10/18/2020 03:27 AM    Urobilinogen 1.0 10/18/2020 03:27 AM    Nitrites Negative 10/18/2020 03:27 AM    Leukocyte Esterase MODERATE (A) 10/18/2020 03:27 AM    WBC 20-50 10/18/2020 03:27 AM    RBC 20-50 10/18/2020 03:27 AM    Epithelial cells 5-10 10/18/2020 03:27 AM    Bacteria TRACE 10/18/2020 03:27 AM    Casts 20-50 10/18/2020 03:27 AM    Crystals, urine OCCASIONAL 10/18/2020 03:27 AM    Mucus 4+ (H) 10/18/2020 03:27 AM        No Known Allergies  Immunization History   Administered Date(s) Administered    Influenza High Dose Vaccine PF 09/26/2016    Influenza Vaccine 10/02/2013, 09/23/2015, 09/13/2018    Influenza Vaccine (Quad) PF (>6 Mo Flulaval, Fluarix, and >3 Yrs Afluria, Fluzone 94788) 11/15/2017    Influenza Vaccine (Tri) Adjuvanted (>65 Yrs FLUAD TRI 18722) 09/13/2018    Pneumococcal Conjugate (PCV-13) 03/30/2016, 09/26/2016    Pneumococcal Vaccine (Unspecified Type) 10/02/2013    TB Skin Test (PPD) Intradermal 10/06/2016    Td 09/13/2013    Td, Adsorbed PF 09/13/2013       All Labs from Last 24 Hrs:  Recent Results (from the past 24 hour(s))   METABOLIC PANEL, COMPREHENSIVE    Collection Time: 10/19/20  3:37 AM   Result Value Ref Range    Sodium 142 136 - 145 mmol/L    Potassium 4.3 3.5 - 5.1 mmol/L    Chloride 111 (H) 98 - 107 mmol/L    CO2 27 21 - 32 mmol/L    Anion gap 4 (L) 7 - 16 mmol/L Glucose 84 65 - 100 mg/dL    BUN 25 (H) 8 - 23 MG/DL    Creatinine 1.17 0.8 - 1.5 MG/DL    GFR est AA >60 >60 ml/min/1.73m2    GFR est non-AA >60 >60 ml/min/1.73m2    Calcium 8.6 8.3 - 10.4 MG/DL    Bilirubin, total 0.2 0.2 - 1.1 MG/DL    ALT (SGPT) 9 (L) 12 - 65 U/L    AST (SGOT) 15 15 - 37 U/L    Alk. phosphatase 109 50 - 136 U/L    Protein, total 6.1 (L) 6.3 - 8.2 g/dL    Albumin 3.1 (L) 3.2 - 4.6 g/dL    Globulin 3.0 2.3 - 3.5 g/dL    A-G Ratio 1.0 (L) 1.2 - 3.5     CBC WITH AUTOMATED DIFF    Collection Time: 10/19/20  3:37 AM   Result Value Ref Range    WBC 4.0 (L) 4.3 - 11.1 K/uL    RBC 3.61 (L) 4.23 - 5.6 M/uL    HGB 10.4 (L) 13.6 - 17.2 g/dL    HCT 32.8 (L) 41.1 - 50.3 %    MCV 90.9 79.6 - 97.8 FL    MCH 28.8 26.1 - 32.9 PG    MCHC 31.7 31.4 - 35.0 g/dL    RDW 15.3 (H) 11.9 - 14.6 %    PLATELET 601 120 - 715 K/uL    MPV 10.1 9.4 - 12.3 FL    ABSOLUTE NRBC 0.00 0.0 - 0.2 K/uL    DF AUTOMATED      NEUTROPHILS 44 43 - 78 %    LYMPHOCYTES 41 13 - 44 %    MONOCYTES 12 4.0 - 12.0 %    EOSINOPHILS 3 0.5 - 7.8 %    BASOPHILS 1 0.0 - 2.0 %    IMMATURE GRANULOCYTES 0 0.0 - 5.0 %    ABS. NEUTROPHILS 1.8 1.7 - 8.2 K/UL    ABS. LYMPHOCYTES 1.6 0.5 - 4.6 K/UL    ABS. MONOCYTES 0.5 0.1 - 1.3 K/UL    ABS. EOSINOPHILS 0.1 0.0 - 0.8 K/UL    ABS. BASOPHILS 0.0 0.0 - 0.2 K/UL    ABS. IMM.  GRANS. 0.0 0.0 - 0.5 K/UL       Discharge Exam:  Patient Vitals for the past 24 hrs:   Temp Pulse Resp BP SpO2   10/19/20 0713 98 °F (36.7 °C) 65 18 (!) 159/87 95 %   10/19/20 0330 97.7 °F (36.5 °C) 61 18 (!) 151/83 98 %   10/19/20 0000 97.8 °F (36.6 °C) 66 16 132/77 98 %   10/18/20 2000 97.6 °F (36.4 °C) 68 18 128/73 96 %   10/18/20 1534 97.9 °F (36.6 °C) 65 18 95/66 96 %     Oxygen Therapy  O2 Sat (%): 95 % (10/19/20 0713)  Pulse via Oximetry: 65 beats per minute (10/18/20 0430)  O2 Device: Room air (10/18/20 0103)    Estimated body mass index is 22.96 kg/m² as calculated from the following:    Height as of this encounter: 5' 10\" (1.778 m).    Weight as of this encounter: 72.6 kg (160 lb). Intake/Output Summary (Last 24 hours) at 10/19/2020 1116  Last data filed at 10/19/2020 0200  Gross per 24 hour   Intake    Output 700 ml   Net -700 ml       *Note that automatically entered I/Os may not be accurate; dependent on patient compliance with collection and accurate  by assistants. General:    Well nourished. Alert. Eyes:   Normal sclerae. Extraocular movements intact. ENT:  Normocephalic, atraumatic. Moist mucous membranes  CV:   Regular rate and rhythm. No murmur, rub, or gallop. Lungs:  Clear to auscultation bilaterally. No wheezing, rhonchi, or rales. Abdomen: Soft, nontender, nondistended. Extremities: Warm and dry. No cyanosis or edema. Neurologic: CN II-XII grossly intact. Residual weakness status post spinal surgeries  Skin:     No rashes or jaundice. Psych:  Normal mood and affect.     Current Med List in Hospital:   Current Facility-Administered Medications   Medication Dose Route Frequency    baclofen (LIORESAL) tablet 5 mg  5 mg Oral TID PRN    busPIRone (BUSPAR) tablet 15 mg  15 mg Oral DAILY    0.9% sodium chloride infusion  100 mL/hr IntraVENous CONTINUOUS    sodium chloride (NS) flush 5-40 mL  5-40 mL IntraVENous Q8H    sodium chloride (NS) flush 5-40 mL  5-40 mL IntraVENous PRN    acetaminophen (TYLENOL) tablet 650 mg  650 mg Oral Q4H PRN    bisacodyL (DULCOLAX) tablet 5 mg  5 mg Oral DAILY PRN    enoxaparin (LOVENOX) injection 40 mg  40 mg SubCUTAneous Q24H    cefTRIAXone (ROCEPHIN) 1 g in 0.9% sodium chloride (MBP/ADV) 50 mL  1 g IntraVENous Q24H    HYDROcodone-acetaminophen (NORCO)  mg tablet 1 Tab  1 Tab Oral Q4H PRN    naloxone (NARCAN) injection 0.4 mg  0.4 mg IntraVENous PRN    influenza vaccine 2020-21 (6 mos+)(PF) (FLUARIX/FLULAVAL/FLUZONE QUAD) injection 0.5 mL  0.5 mL IntraMUSCular PRIOR TO DISCHARGE    sertraline (ZOLOFT) tablet 100 mg  100 mg Oral BID    dextroamphetamine-amphetamine (ADDERALL) tablet 30 mg  30 mg Oral BID    tamsulosin (FLOMAX) capsule 0.8 mg  0.8 mg Oral DAILY    celecoxib (CELEBREX) capsule 200 mg  200 mg Oral BID    gabapentin (NEURONTIN) capsule 300 mg  300 mg Oral BID       Discharge Info:   Current Discharge Medication List      START taking these medications    Details   cefdinir (OMNICEF) 300 mg capsule Take 1 Cap by mouth two (2) times a day for 3 days. Qty: 6 Cap, Refills: 0         CONTINUE these medications which have NOT CHANGED    Details   amLODIPine (Norvasc) 5 mg tablet Take 5 mg by mouth daily. celecoxib (CELEBREX) 200 mg capsule Take 200 mg by mouth daily. busPIRone (BUSPAR) 15 mg tablet Take 15 mg by mouth daily. traMADoL (ULTRAM) 50 mg tablet Take 50 mg by mouth every six (6) hours as needed for Pain. raNITIdine (ZANTAC) 300 mg tab TAKE ONE TABLET BY MOUTH EVERY DAY  Qty: 90 Tab, Refills: 3    Associated Diagnoses: Esophagitis      dextroamphetamine-amphetamine (ADDERALL) 30 mg tablet 1 po TID for June 2019  Indications: Recurring Sleep Episodes During the Day  Qty: 90 Tab, Refills: 0    Associated Diagnoses: Narcolepsy due to underlying condition without cataplexy      meclizine (ANTIVERT) 25 mg tablet Take 1 Tab by mouth three (3) times daily as needed for Dizziness or Nausea for up to 30 doses. Qty: 30 Tab, Refills: 1      cyclobenzaprine (FLEXERIL) 10 mg tablet Take 1 Tab by mouth three (3) times daily as needed for Muscle Spasm(s). Qty: 90 Tab, Refills: 2    Associated Diagnoses: Spinal stenosis of lumbar region with radiculopathy      loratadine (CLARITIN) 10 mg tablet Take 1 Tab by mouth daily. Qty: 90 Tab, Refills: 11    Associated Diagnoses: Non-seasonal allergic rhinitis due to pollen      nitroglycerin (NITROSTAT) 0.4 mg SL tablet 1 Tab by SubLINGual route every five (5) minutes as needed for Chest Pain. X3.   If unrelieved call 911  Qty: 3 Bottle, Refills: 1    Associated Diagnoses: Other chest pain      promethazine (PHENERGAN) 25 mg tablet Take 1 Tab by mouth every six (6) hours as needed for Nausea. Qty: 35 Tab, Refills: 1    Associated Diagnoses: Nausea      baclofen (LIORESAL) 10 mg tablet 1-2 po TID prn muscle spasm - causes sleepiness  Qty: 100 Tab, Refills: 5    Associated Diagnoses: Spinal stenosis of lumbar region with radiculopathy      omeprazole (PRILOSEC) 40 mg capsule Take 1 Cap by mouth daily. Qty: 90 Cap, Refills: 5    Associated Diagnoses: Esophageal stenosis      metoprolol succinate (TOPROL-XL) 50 mg XL tablet TAKE ONE TABLET BY MOUTH EVERY DAY  Qty: 90 Tab, Refills: 3    Associated Diagnoses: Essential hypertension with goal blood pressure less than 140/90               Time spent in patient discharge planning and coordination 35 minutes.     Signed:  Neli Cortes MD

## 2020-10-19 NOTE — DISCHARGE INSTRUCTIONS
Patient Education        Urinary Tract Infections in Men: Care Instructions  Your Care Instructions     A urinary tract infection, or UTI, is a general term for an infection anywhere between the kidneys and the tip of the penis. UTIs can also be a result of a prostate problem. Most cause pain or burning when you urinate. Most UTIs are caused by bacteria and can be cured with antibiotics. It is important to complete your treatment so that the infection does not get worse. Follow-up care is a key part of your treatment and safety. Be sure to make and go to all appointments, and call your doctor if you are having problems. It's also a good idea to know your test results and keep a list of the medicines you take. How can you care for yourself at home? · Take your antibiotics as prescribed. Do not stop taking them just because you feel better. You need to take the full course of antibiotics. · Take your medicines exactly as prescribed. Your doctor may have prescribed a medicine, such as phenazopyridine (Pyridium), to help relieve pain when you urinate. This turns your urine orange. You may stop taking it when your symptoms get better. But be sure to take all of your antibiotics, which treat the infection. · Drink extra water for the next day or two. This will help make the urine less concentrated and help wash out the bacteria causing the infection. (If you have kidney, heart, or liver disease and have to limit your fluids, talk with your doctor before you increase your fluid intake.)  · Avoid drinks that are carbonated or have caffeine. They can irritate the bladder. · Urinate often. Try to empty your bladder each time. · To relieve pain, take a hot bath or lay a heating pad (set on low) over your lower belly or genital area. Never go to sleep with a heating pad in place. To help prevent UTIs  · Drink plenty of fluids, enough so that your urine is light yellow or clear like water.  If you have kidney, heart, or liver disease and have to limit fluids, talk with your doctor before you increase the amount of fluids you drink. · Urinate when you have the urge. Do not hold your urine for a long time. Urinate before you go to sleep. · Keep your penis clean. Catheter care  If you have a drainage tube (catheter) in place, the following steps will help you care for it. · Always wash your hands before and after touching your catheter. · Check the area around the urethra for inflammation or signs of infection. Signs of infection include irritated, swollen, red, or tender skin, or pus around the catheter. · Clean the area around the catheter with soap and water two times a day. Dry with a clean towel afterward. · Do not apply powder or lotion to the skin around the catheter. To empty the urine collection bag   · Wash your hands with soap and water. · Without touching the drain spout, remove the spout from its sleeve at the bottom of the collection bag. Open the valve on the spout. · Let the urine flow out of the bag and into the toilet or a container. Do not let the tubing or drain spout touch anything. · After you empty the bag, clean the end of the drain spout with tissue and water. Close the valve and put the drain spout back into its sleeve at the bottom of the collection bag. · Wash your hands with soap and water. When should you call for help? Call your doctor now or seek immediate medical care if:    · Symptoms such as a fever, chills, nausea, or vomiting get worse or happen for the first time.     · You have new pain in your back just below your rib cage. This is called flank pain.     · There is new blood or pus in your urine.     · You are not able to take or keep down your antibiotics. Watch closely for changes in your health, and be sure to contact your doctor if:    · You are not getting better after taking an antibiotic for 2 days.     · Your symptoms go away but then come back.    Where can you learn more?  Go to http://www.gray.com/  Enter B303 in the search box to learn more about \"Urinary Tract Infections in Men: Care Instructions. \"  Current as of: June 29, 2020               Content Version: 12.6  © 6375-8769 [employer]. Care instructions adapted under license by [employer] (which disclaims liability or warranty for this information). If you have questions about a medical condition or this instruction, always ask your healthcare professional. [address] any warranty or liability for your use of this information.

## 2020-10-19 NOTE — PROGRESS NOTES
Care Management Interventions  PCP Verified by CM: Yes  Discharge Durable Medical Equipment: No  Physical Therapy Consult: No  Occupational Therapy Consult: No  Speech Therapy Consult: No  Current Support Network: Lives with Spouse, Own Home  Discharge Location  Discharge Placement: Home with family assistance    CM met with patient in room. Social distance maintained with mask in place. Pt lives in home with wife. He is wheelchair bound; has ramp. He states he has wheelchair and hospital bed in home. Neither patient nor wife drive. They have son that lives locally. Pt states he also has [facility] aide 7 days a week for 3 hours/day. Pt medically ready for discharge home. He requests ambulance transfer home. CM set up transport with [facility] ambulance service;  at 1:45pm. Pt aware and states wife will be home to let him in.

## 2020-10-19 NOTE — PROGRESS NOTES
[facility] Case Management note:  Patient screened in Prehab for discharge planning needs. Patient scheduled for a future total joint replacement. We discussed Home Health and equipment needed after surgery. List of Home Health providers offered. Patient w/o preference towards provider. Initial referral sent to [facility].

## 2020-10-19 NOTE — PROGRESS NOTES
Shift assessment complete. Patient is alert and oriented, in bed. Respirations are even and unlabored, bowel sounds are present. Patient medicated with Tylenol for mild discomfort in shoulder. Vital signs within normal range. No needs expressed at this time. Bed low and locked, call light within reach.

## 2020-10-19 NOTE — PROGRESS NOTES
Problem: Falls - Risk of  Goal: *Absence of Falls  Description: Document Geoff Jackson Fall Risk and appropriate interventions in the flowsheet.   Outcome: Progressing Towards Goal  Note: Fall Risk Interventions:  Mobility Interventions: Communicate number of staff needed for ambulation/transfer         Medication Interventions: Patient to call before getting OOB    Elimination Interventions: Call light in reach, Elevated toilet seat, Patient to call for help with toileting needs, Stay With Me (per policy), Toilet paper/wipes in reach, Toileting schedule/hourly rounds, Urinal in reach              Problem: Patient Education: Go to Patient Education Activity  Goal: Patient/Family Education  Outcome: Progressing Towards Goal

## 2020-10-19 NOTE — PROGRESS NOTES
I have reviewed discharge instructions with the patient. The patient verbalized understanding. [facility] [place].

## 2020-10-19 NOTE — PROGRESS NOTES
Patient resting in bed eating breakfast. Alert and oriented. Bowel sounds active. Appetite well. Tylenol given for pain. Bed is low and locked. Call light within reach. Instructed to call for assistance. Verbalizes understanding. No needs at this time.

## 2020-10-20 ENCOUNTER — PATIENT OUTREACH (OUTPATIENT)
Dept: CASE MANAGEMENT | Age: 75
End: 2020-10-20

## 2020-10-20 LAB
BACTERIA SPEC CULT: NORMAL
SERVICE CMNT-IMP: NORMAL

## 2020-10-20 NOTE — PROGRESS NOTES
Transition of Care Hospital Discharge Follow-Up      Date/Time:  10/20/2020 10:38 AM    Patient was admitted to [facility] on 10/18/2020 and discharged on 10/19/2020 for UTI. The physician discharge summary was available at the time of outreach. Patient was contacted within 7 business days of discharge. Inpatient RUR score:6  Was this a readmission? no   Patient stated reason for the readmission: none  Patients top risk factors for readmission: UTI      LPN Care Coordinator contacted the patient by telephone to perform post hospital discharge assessment. Verified name and  with patient as identifiers. Provided introduction to self, and explanation of the Care Coordinator role. Patient received hospital discharge instructions. LPN reviewed discharge instructions and red flags with patient who verbalized understanding. Patient given an opportunity to ask questions and does not have any further questions or concerns at this time. The patient agrees to contact the PCP office for questions related to their healthcare. LPN provided contact information for future reference. Home Health orders at discharge: [address]:  Date of initial or scheduled visit:   (Assist with coordination of services if necessary.)    Durable Medical Equipment ordered at discharge: none  [address]:   [address] [facility] received:   (Assist patient in obtaining DME orders &/or equipment if necessary.)    Medication(s):   Medication review was performed with patient, who verbalizes understanding of administration of home medications. There were no barriers to obtaining medications identified at this time. Current Outpatient Medications   Medication Sig    cefdinir (OMNICEF) 300 mg capsule Take 1 Cap by mouth two (2) times a day for 3 days.  gabapentin (NEURONTIN) 300 mg capsule Take 300 mg by mouth three (3) times daily.  Pt normally only take twice daily    amLODIPine (Norvasc) 5 mg tablet Take 5 mg by mouth daily.  tamsulosin (FLOMAX) 0.4 mg capsule Take 0.8 mg by mouth daily.  celecoxib (CELEBREX) 200 mg capsule Take 200 mg by mouth daily.  busPIRone (BUSPAR) 15 mg tablet Take 15 mg by mouth daily.  traMADoL (ULTRAM) 50 mg tablet Take 50 mg by mouth every six (6) hours as needed for Pain.  sertraline (ZOLOFT) 100 mg tablet Take 2 Tabs by mouth daily. depression dx:F32.9 (Patient taking differently: Take 100 mg by mouth two (2) times a day. depression dx:F32.9)    dextroamphetamine-amphetamine (ADDERALL) 30 mg tablet 1 po TID for June 2019  Indications: Recurring Sleep Episodes During the Day (Patient taking differently: 1 po TID for June 2019  Usually only takes 2 times  Indications: recurring sleep episodes during the day)    HYDROcodone-acetaminophen (NORCO) 7.5-325 mg per tablet Take 1 Tab by mouth every eight (8) hours as needed for Pain for up to 30 days. Max Daily Amount: 3 Tabs. prn severe pain for June 2019    meclizine (ANTIVERT) 25 mg tablet Take 1 Tab by mouth three (3) times daily as needed for Dizziness or Nausea for up to 30 doses.  cyclobenzaprine (FLEXERIL) 10 mg tablet Take 1 Tab by mouth three (3) times daily as needed for Muscle Spasm(s).  loratadine (CLARITIN) 10 mg tablet Take 1 Tab by mouth daily.  nitroglycerin (NITROSTAT) 0.4 mg SL tablet 1 Tab by SubLINGual route every five (5) minutes as needed for Chest Pain. X3. If unrelieved call 911    promethazine (PHENERGAN) 25 mg tablet Take 1 Tab by mouth every six (6) hours as needed for Nausea.  raNITIdine (ZANTAC) 300 mg tab TAKE ONE TABLET BY MOUTH EVERY DAY    baclofen (LIORESAL) 10 mg tablet 1-2 po TID prn muscle spasm - causes sleepiness    omeprazole (PRILOSEC) 40 mg capsule Take 1 Cap by mouth daily.     metoprolol succinate (TOPROL-XL) 50 mg XL tablet TAKE ONE TABLET BY MOUTH EVERY DAY    potassium chloride SR (KLOR-CON 10) 10 mEq tablet Take 2 p.o. daily as needed    naloxone (NARCAN) 0.4 mg/mL injection 1 injection at first sign of over dose, may repeat every 2-3 min. Call 911.  OTHER Disabled placard - This is to certify that this patient has an inability to ordinarily walk 100 feet nonstop without aggravating an existing medical condition, including the increase of pain.  OTHER Ramp - needed for entrance to home  Dx:  Frequent falls R29.6, gait instability  R26.81    OTHER Disabled placard - This is to certify that this patient has an inability to ordinarily walk 100 feet nonstop without aggravating an existing medical condition, including the increase of pain.  OTHER Knee High compression hose or socks  (20 - 30 mmHg if possible) Wear Daily Dx:   ( vericose veins, edema - R60.9)     No current facility-administered medications for this visit. There are no discontinued medications. ADL assessment:   (If patient is unable to perform ADLs  what is the limiting factor(s)? Do they have a support system that can assist? If no support system is present, discuss possible assistance that they may be able to obtain. Escalate for SW if ongoing issues are verbalized by pt or anticipated)    [facility] follow up appointment(s): No future appointments. Non-[facility] follow up appointment(s):   7 Day follow up with PCP or Specialist: House Calls 10/20/2020  Transportation arranged:     Covid Risk Education    Patient has following risk factors of: UTI. Education provided regarding infection prevention, and signs and symptoms of COVID-19 and when to seek medical attention with patient who verbalized understanding. Discussed exposure protocols and quarantine From CDC: Are you at higher risk for severe illness?  and given an opportunity for questions and concerns. The patient agrees to contact the COVID-19 hotline [phone] or PCP office for questions related to COVID-19.      For more information on steps you can take to protect yourself, see CDC's How to Protect Yourself     Patient/family/caregiver given information for [employer] and agrees to enroll no  Patient's preferred e-mail: declines  Patient's preferred phone number: declines      Any other questions or concerns expressed by patient? Patient voices no concerns at this time    Scheduled next follow up call or referral to CTN/ ACM:  Next follow up call:   Within 14 days  Goals Addressed                 This Visit's Progress     Attends follow up appointments on schedule        Takes Medications as prescribed

## 2020-10-23 LAB
BACTERIA SPEC CULT: NORMAL
BACTERIA SPEC CULT: NORMAL
SERVICE CMNT-IMP: NORMAL
SERVICE CMNT-IMP: NORMAL

## 2020-10-23 NOTE — ADT AUTH CERT NOTES
PREVIOUSLY DENIED FOR INPATIENT DOWNGRADED TO OBSERVATION REF #  [id number] PLEASE FAX OR CALL BACK TO NOTIFY IF  AUTHORIZATION FOR OBSERVATION IS OR IS NOT REQUIRED  PHONE # [phone]  FAX # [phone]

## 2020-11-03 ENCOUNTER — PATIENT OUTREACH (OUTPATIENT)
Dept: CASE MANAGEMENT | Age: 75
End: 2020-11-03

## 2020-11-03 NOTE — PROGRESS NOTES
Attempted to call patient to f/u on SEAN call, unable to reach left voice message. This Care Coordinator will graduate this patient from this program, patient will be added back if phone call is returned.

## 2021-03-30 ENCOUNTER — TRANSCRIBE ORDER (OUTPATIENT)
Dept: SCHEDULING | Age: 76
End: 2021-03-30

## 2021-03-30 DIAGNOSIS — R10.10 UPPER ABDOMINAL PAIN: Primary | ICD-10-CM

## 2021-04-07 ENCOUNTER — HOSPITAL ENCOUNTER (OUTPATIENT)
Dept: GENERAL RADIOLOGY | Age: 76
Discharge: HOME | End: 2021-04-07
Attending: PHYSICIAN ASSISTANT
Payer: MEDICARE

## 2021-04-07 DIAGNOSIS — R10.10 UPPER ABDOMINAL PAIN: ICD-10-CM

## 2021-04-07 PROCEDURE — 74011000250 HC RX REV CODE- 250: Performed by: PHYSICIAN ASSISTANT

## 2021-04-07 PROCEDURE — 74246 X-RAY XM UPR GI TRC 2CNTRST: CPT

## 2021-04-07 RX ADMIN — ANTACID/ANTIFLATULENT 8 G: 380; 550; 10; 10 GRANULE, EFFERVESCENT ORAL at 11:17

## 2021-04-07 RX ADMIN — BARIUM SULFATE 700 MG: 700 TABLET ORAL at 11:17

## 2021-04-07 RX ADMIN — BARIUM SULFATE 355 ML: 0.6 SUSPENSION ORAL at 11:17

## 2021-04-07 RX ADMIN — BARIUM SULFATE 135 ML: 980 POWDER, FOR SUSPENSION ORAL at 11:17

## 2021-04-30 ENCOUNTER — HOSPITAL ENCOUNTER (OUTPATIENT)
Dept: SURGERY | Age: 76
Discharge: HOME | End: 2021-04-30

## 2021-04-30 VITALS — HEIGHT: 70 IN | BODY MASS INDEX: 25.05 KG/M2 | WEIGHT: 175 LBS

## 2021-04-30 NOTE — PERIOP NOTES
Patient verified name, , and procedure. Type: 1a; abbreviated assessment per anesthesia guidelines  Labs per surgeon: none  Labs per anesthesia: none    Pt not aware of his medications - states his wife handles them. He will have wife call PAT today to review medications ([phone]). A negative Covid swab result is required to proceed with surgery; appointments are made by the surgeon office and test should be collected 7 days prior to surgery. The testing center is located at the . [address], [place]. Instructed pt that they will be notified by the doctor office for time of arrival to GI lab. If any questions please call the GI lab at [phone]. Follow diet and prep instructions per office. May have clear liquids until 4 hours prior to time of arrival.    Chunky Roxo or shower the night before and the am of surgery with antibacterial soap. No lotions, oils, powders, cologne on skin. No make up, eye make up or jewelry. Wear loose fitting comfortable, clean clothing. Must have adult present in building the entire time . The following discharge instructions reviewed with patient: medication given during procedure may cause drowsiness for several hours, therefore, do not drive or operate machinery for remainder of the day, no alcohol on the day of your procedure, resume regular diet and activity unless otherwise directed, for mild sore throat you may use Cepacol throat lozenges or warm salt water gargles as needed, call your physician for any problems or questions. Patient verbalizes understanding.

## 2021-05-03 RX ORDER — AMOXICILLIN 500 MG/1
500 TABLET, FILM COATED ORAL 3 TIMES DAILY
COMMUNITY
End: 2021-11-04

## 2021-05-03 RX ORDER — ONDANSETRON 4 MG/1
4 TABLET, FILM COATED ORAL
Status: ON HOLD | COMMUNITY
End: 2021-11-04 | Stop reason: CLARIF

## 2021-05-03 NOTE — PROGRESS NOTES
Annie at [facility] called about patient receiving COVID swab through [facility]" on April 30th. Allie Germain asked to fax results to Endoscopy and to ES Pre-op.

## 2021-05-03 NOTE — PERIOP NOTES
Spoke with wife and clarified PTA meds. Instructed to have patient take following meds DOP:  Amlodipine, Buspar, Adderall, Gabapentin, Metoprolol, Zoloft, Tamsulosin, and Myrbetriq.  Verbalized understanding

## 2021-05-07 ENCOUNTER — ANESTHESIA (OUTPATIENT)
Dept: ENDOSCOPY | Age: 76
End: 2021-05-07
Payer: MEDICARE

## 2021-05-07 ENCOUNTER — ANESTHESIA EVENT (OUTPATIENT)
Dept: ENDOSCOPY | Age: 76
End: 2021-05-07
Payer: MEDICARE

## 2021-05-07 ENCOUNTER — HOSPITAL ENCOUNTER (OUTPATIENT)
Age: 76
Setting detail: OUTPATIENT SURGERY
Discharge: HOME | End: 2021-05-07
Attending: INTERNAL MEDICINE | Admitting: INTERNAL MEDICINE
Payer: MEDICARE

## 2021-05-07 VITALS
OXYGEN SATURATION: 97 % | DIASTOLIC BLOOD PRESSURE: 78 MMHG | HEART RATE: 69 BPM | SYSTOLIC BLOOD PRESSURE: 139 MMHG | TEMPERATURE: 97.6 F | RESPIRATION RATE: 18 BRPM

## 2021-05-07 PROCEDURE — 76060000031 HC ANESTHESIA FIRST 0.5 HR: Performed by: INTERNAL MEDICINE

## 2021-05-07 PROCEDURE — 76040000025: Performed by: INTERNAL MEDICINE

## 2021-05-07 PROCEDURE — 74011250636 HC RX REV CODE- 250/636: Performed by: NURSE ANESTHETIST, CERTIFIED REGISTERED

## 2021-05-07 PROCEDURE — 2709999900 HC NON-CHARGEABLE SUPPLY: Performed by: INTERNAL MEDICINE

## 2021-05-07 PROCEDURE — 74011000250 HC RX REV CODE- 250: Performed by: NURSE ANESTHETIST, CERTIFIED REGISTERED

## 2021-05-07 RX ORDER — SODIUM CHLORIDE, SODIUM LACTATE, POTASSIUM CHLORIDE, CALCIUM CHLORIDE 600; 310; 30; 20 MG/100ML; MG/100ML; MG/100ML; MG/100ML
INJECTION, SOLUTION INTRAVENOUS
Status: DISCONTINUED | OUTPATIENT
Start: 2021-05-07 | End: 2021-05-07 | Stop reason: HOSPADM

## 2021-05-07 RX ORDER — PROPOFOL 10 MG/ML
INJECTION, EMULSION INTRAVENOUS
Status: DISCONTINUED | OUTPATIENT
Start: 2021-05-07 | End: 2021-05-07 | Stop reason: HOSPADM

## 2021-05-07 RX ORDER — PROPOFOL 10 MG/ML
INJECTION, EMULSION INTRAVENOUS AS NEEDED
Status: DISCONTINUED | OUTPATIENT
Start: 2021-05-07 | End: 2021-05-07 | Stop reason: HOSPADM

## 2021-05-07 RX ORDER — LIDOCAINE HYDROCHLORIDE 20 MG/ML
INJECTION, SOLUTION EPIDURAL; INFILTRATION; INTRACAUDAL; PERINEURAL AS NEEDED
Status: DISCONTINUED | OUTPATIENT
Start: 2021-05-07 | End: 2021-05-07 | Stop reason: HOSPADM

## 2021-05-07 RX ORDER — SODIUM CHLORIDE, SODIUM LACTATE, POTASSIUM CHLORIDE, CALCIUM CHLORIDE 600; 310; 30; 20 MG/100ML; MG/100ML; MG/100ML; MG/100ML
75 INJECTION, SOLUTION INTRAVENOUS CONTINUOUS
Status: CANCELLED | OUTPATIENT
Start: 2021-05-07 | End: 2021-05-08

## 2021-05-07 RX ADMIN — PROPOFOL 50 MG: 10 INJECTION, EMULSION INTRAVENOUS at 13:15

## 2021-05-07 RX ADMIN — SODIUM CHLORIDE, SODIUM LACTATE, POTASSIUM CHLORIDE, AND CALCIUM CHLORIDE: 600; 310; 30; 20 INJECTION, SOLUTION INTRAVENOUS at 13:09

## 2021-05-07 RX ADMIN — PROPOFOL 20 MG: 10 INJECTION, EMULSION INTRAVENOUS at 13:20

## 2021-05-07 RX ADMIN — LIDOCAINE HYDROCHLORIDE 80 MG: 20 INJECTION, SOLUTION EPIDURAL; INFILTRATION; INTRACAUDAL; PERINEURAL at 13:15

## 2021-05-07 RX ADMIN — PROPOFOL 50 MG: 10 INJECTION, EMULSION INTRAVENOUS at 13:17

## 2021-05-07 RX ADMIN — PROPOFOL 140 MCG/KG/MIN: 10 INJECTION, EMULSION INTRAVENOUS at 13:15

## 2021-05-07 NOTE — H&P
History of Present Illness:  1. Upper abdominal pain:     Mr. Gracia Kincaid is a patient of Dr. Jhonatan Peters with MMPs outlined below who is seen today at the request of Abhijit Shen NP for further evaluation of abdominal pain & diarrhea. The latter occurred several weeks ago, lasted for 6 week, but has resolved. He complains of upper band like, pressure abdominal pain which has been present \"for awhile\" exacerbated for by nothing, and somewhat alleviated with belching and taking Bentyl. Recent abdominal US showed gallstones without evidence of acute cholecystitis. Reports a remote hx of GERD, not much an issue at this time. Denies N/V. Appetite has been poor lately. No s/sx of GI bleeding including rectal bleeding or melena. He was last seen in 2018 for dysphagia, EGD arranged, but was later canceled by patient due to having the flu. He never called back to reschedule. Patient reports a normal colonoscopy in  and states that he is due for surveillance in . No F/C. He has been gradually losing weight since 2018 ~ total of 16#.         Past Medical/Surgical History:   (Detailed)  Disease/disorder Onset Date Management Date Comments   Prostate Cancer 2001 Prostatectomy  HSV 03/15/2021 -     Lumbar Spine Fusion  HSV 03/15/2021 -   Kidney Stones    HSV 03/15/2021 -   Coronary Arteriosclerosis    HSV 03/15/2021 -   Spinal Stenosis    HSV 03/15/2021 -   Cardiomegaly    HSV 03/15/2021 -   CAD - Coronary artery disease    [facility] 2018 -   vitamin D deficiency    HSV 03/15/2021 -   Insomnia    HSV 03/15/2021 -   Benign Prostatic Hyperplasia    HSV 03/15/2021 -   Depression       Anxiety       Acid reflux       Hypertension         Colonoscopy     IBS - Irritable Bowel Syndrome         Cholecystectomy       Family History:  (Detailed)  Relationship Family Member Name  Age at Death Condition Onset Age Cause of Death       No family history of Colon polyps  N   Brother    Cancer, colon  N       Social History: (Detailed)  Tobacco use reviewed. Preferred language is [place]. Tobacco use status: Current non-smoker. Smoking status: Never smoker. SMOKING STATUS  Type Smoking Status Usage Per Day Years Used Total Pack Years    Never smoker        ALCOHOL  There is no history of alcohol use. CAFFEINE  The patient uses caffeine: soda - 5 drinks a day. HOME ENVIRONMENT/SAFETY  The patient has not fallen in the last year. Current Medications:  Medication Directions   Adderall 30 mg tablet take 1 tablet by oral route 3 times every day before breakfast   amlodipine 10 mg tablet take 1 tablet by oral route  every day   buspirone 15 mg tablet take 1 tablet by oral route  every day   celecoxib 200 mg capsule take 1 capsule by oral route  every day as needed   dicyclomine 20 mg tablet take 1 tablet by oral route 4 times every day   diphenoxylate-atropine 2.5 mg-0.025 mg tablet take 2 tablet by oral route  every 6 hours as needed   Dramamine 50 mg chewable tablet chew 1 tablet by oral route  every 6 hours as needed   gabapentin 300 mg capsule take 1 capsule by oral route  every day TID   hydrocodone 5 mg-acetaminophen 325 mg tablet take 1 tablet by oral route  every 6 hours as needed for pain   methocarbamol 500 mg tablet take 1 tablet by oral route  every day   metoprolol succinate ER 25 mg tablet,extended release 24 hr take 1 tablet by oral route  every day   montelukast 10 mg tablet take 1 tablet by oral route  every day in the evening   nitroglycerin 0.4 mg sublingual tablet place 1 tablet by sublingual route at the 1st sign of attack; may repeat every 5 min until relief; if pain persists after 3 tablets in 15 min, prompt medical attention is recommended   Protonix 40 mg tablet,delayed release take 1 tablet by oral route  every day 30mins.  prior to breakfast   tamsulosin 0.4 mg capsule take 1 capsule by oral route  every day 1/2 hour following the same meal each day   Zoloft 100 mg tablet take 1 tablet by oral route every day     Allergies:  Ingredient Reaction (Severity) Medication Name Comment   NO KNOWN DRUG ALLERGIES   NKDA. Reviewed, no changes. Review of Systems:  System Neg/Pos Details   GI Positive Abdominal pain, Diarrhea. All other review of systems are negative. Vital Signs:  BP mm/Hg Pulse Resp Pulse Ox Temp F Ht (Total in.) Weight (lbs.) Weight (oz.) BMI   128/72 72 16  97.90 70.00 159.00  22.81     Physical Exam:  GENERAL: Debilitated elderly male, hydrated patient who appears their stated age. SKIN: Extremities and face reveal no rashes. No palmar erythema. HEENT: Sclerae anicteric. No oral ulcerations or abnormal pigment of the lips. PERRLA  CARDIOVASCULAR: RRR. No M/G/R. Carotids without bruits. RESPIRATORY:  Clear breath sounds with no rales or wheezes. GI: The abdomen is soft, ND with NABS. MILDLY TTP upper abdomen without G/R. Neg for HSM. No masses palpable. RECTAL: Deferred. MUSCULOSKELETAL: Sitting in wheel chair. No pitting edema of the lower legs. NEUROLOGICAL: Gross memory appears intact. A&O x3  PSYCHIATRIC: Mood appears appropriate with judgement intact. LYMPHATIC: No cervical or supraclavicular adenopathy. Assessment/Plan:  # Detail Type Description    1. Assessment Upper abdominal pain (R10.10). Impression The recent onset of diarrhea that lasted for 6 weeks has resolved, but has had upper abdominal discomfort \"for awhile. \" Probable post infectious IBS. Upper abdominal pain is bandlike pressure that improves with belching and Bentyl. Symptoms likely functional but consider mucosal disease such as PUD. Last seen in 2018 for dysphagia, EGD was cancelled by patient, never reschedule, and is not having issues with dysphagia at this time. Recommend continuing Bentyl before meals. Consider add PPIs base on clinical course. May benefit from daily probiotics. Obtain upper GI series for further evaluation.    If the above imaging negative and pain persists, consider EGD for further evaluation +/- CMP or lipase. Follow-up in 3-4 weeks for progress report. RTC sooner for further evaluation. Plan Orders Further evaluations ordered today include Abdominal Series X-ray to be performed. He is to schedule a follow-up visit with RICKY Yu 04/15/2021.

## 2021-05-07 NOTE — ANESTHESIA PREPROCEDURE EVALUATION
Relevant Problems   NEUROLOGY   (+) Recurrent depression (HCC)      CARDIOVASCULAR   (+) HTN (hypertension)      RENAL FAILURE   (+) Kidney stones      PERSONAL HX & FAMILY HX OF CANCER   (+) Prostate cancer [facility])       Anesthetic History               Review of Systems / Medical History  Patient summary reviewed and pertinent labs reviewed    Pulmonary                   Neuro/Psych         Psychiatric history    Comments: Narcolepsy--Adderal  Hx of closed T10 frx  Paresis after lumbar surgery 2019, improved after posterior cervical surgery 2020 Cardiovascular    Hypertension          CAD    Exercise tolerance: <4 METS     GI/Hepatic/Renal     GERD    Renal disease: stones      Comments: Esophageal stenosis Endo/Other             Other Findings            Physical Exam    Airway  Mallampati: II  TM Distance: > 6 cm  Neck ROM: normal range of motion   Mouth opening: Normal     Cardiovascular               Dental         Pulmonary                 Abdominal  GI exam deferred       Other Findings            Anesthetic Plan    ASA: 3  Anesthesia type: total IV anesthesia          Induction: Intravenous  Anesthetic plan and risks discussed with: Patient

## 2021-05-07 NOTE — PERIOP NOTES
Discharge instructions given over the phone with the wife Joseph Mosquera. Patient wife states understanding of instructions.

## 2021-05-07 NOTE — ANESTHESIA POSTPROCEDURE EVALUATION
Procedure(s):  ESOPHAGOGASTRODUODENOSCOPY (EGD)/ BMI 22  ESOPHAGEAL DILATION.     total IV anesthesia    Anesthesia Post Evaluation      Multimodal analgesia: multimodal analgesia used between 6 hours prior to anesthesia start to PACU discharge  Patient location during evaluation: PACU  Patient participation: complete - patient participated  Level of consciousness: awake  Pain management: adequate  Airway patency: patent  Anesthetic complications: no  Cardiovascular status: acceptable  Respiratory status: acceptable  Hydration status: acceptable  Post anesthesia nausea and vomiting:  none  Final Post Anesthesia Temperature Assessment:  Normothermia (36.0-37.5 degrees C)      INITIAL Post-op Vital signs:   Vitals Value Taken Time   /74 05/07/21 1345   Temp 36.4 °C (97.6 °F) 05/07/21 1334   Pulse 69 05/07/21 1345   Resp 18 05/07/21 1345   SpO2 98 % 05/07/21 1345

## 2021-05-07 NOTE — PROCEDURES
67 y.o. AAM with history significant for DMII, HTN, stroke, and CKD4 presents for evaluation of worsening CKD sent in by provider. Creatine = 2.0 1 year ago now with creatine 5.4 and potassium 5.8 worsening swelling and anasarca 2-3+ edema up to abdomen and flank, activity intolerance and orthopnea X 5 pillows. He is not on hemodialysis.  Amlodipine stopped.  Worsening BP and started on Hydralazine.  Nephrology consulted.  BP improved, but worsening Creat with persistent hyperkalemia.  Patient will need HD.  IR consulted for HD catheter.  Temporary HD catheter placed on 8/20 and patient underwent HD.  Catheter switched to tunneled catheter on 8/21.  Patient with right sided hemiplegia from previous stroke.  Deconditioned and PT/OT consulted.  Initial recommendation for SNF.  Patient will need outpatient HD arrangements and SW consulted.  Patient started spiking fevers overnight 8/25.  Unremarkable CXR.  BCx and UA obtained.   PROCEDURE: Esophagogastroduodenoscopy with Lynne Regulo dilation of esophagus    ENDOSCOPIST: Aristides Stuart M.D. PREOPERATIVE DIAGNOSIS: Epigastric pain, abnormal xray    POSTOPERATIVE DIAGNOSIS: Atrophic gastritis    INSTRUMENTS: [id number], Richardson 54 FG    SEDATION: MAC    DESCRIPTION: After informed consent was obtained, the patient was taken to the endoscopy suite and placed in the left lateral decubitus position. Intravenous sedation was administered by the Anesthesia provider. After adequate sedation had been achieved the endoscope was inserted over the tongue and through the posterior pharynx under direct visualization down the esophagus to the stomach and into the second portion of the duodenum. The endoscopic was withdrawn from that point, performing a careful survey of the mucosa. Retroflexion was performed in the gastric fundus. FINDINGS:  Esophagus: Normal.  Dilation with 54 FG Richardson dilator and minimal mucosal trauma at GEJ seen after dilation. Stomach:  Diffuse severe atrophic gastritis with erosions of antrum with blood. Duodenum: Normal    Estimated blood loss:  0 cc-minimal    IMPRESSION: No evidence of esophageal stricture. PLAN:  Stop NSAIDs. Start Prilosec OTC 20 mg daily. F/U 1 month. If pain persists, obtain CT for pancreatic evaluation. If symptoms resolved, f/u PRN      D. Maurizio Pollard MD

## 2021-05-07 NOTE — DISCHARGE INSTRUCTIONS
Upper GI Endoscopy: What to Expect at [address] [place] [address]  After you have an endoscopy you will be able to go home after your doctor or nurse checks to make sure you are not having any problems. You may have to stay overnight if you had treatment during the test. You may have a sore throat for a day or two after the test.  This care sheet gives you a general idea about what to expect after the test.  How can you care for yourself at home? Activity  · Rest as much as you need to after you go home. · You should be able to go back to your usual activities the day after the test.  Diet  · Follow your doctor's directions for eating after the test.  · Drink plenty of fluids (unless your doctor has told you not to). Medications  · If you have a sore throat the day after the test, use an over-the-counter spray to numb your throat. Follow-up care is a key part of your treatment and safety. Be sure to make and go to all appointments, and call your doctor if you are having problems. It's also a good idea to know your test results and keep a list of the medicines you take. When should you call for help? Call 911 anytime you think you may need emergency care. For example, call if:  · You passed out (lost consciousness). · You cough up blood. · You vomit blood or what looks like coffee grounds. · You pass maroon or very bloody stools. Call your doctor now or seek immediate medical care if:  · You have trouble swallowing. · You have belly pain. · Your stools are black and tarlike or have streaks of blood. · You are sick to your stomach or cannot keep fluids down. Watch closely for changes in your health, and be sure to contact your doctor if:  · Your throat still hurts after a day or two. You do not get better as expected.     After general anesthesia or intravenous sedation, for 24 hours or while taking prescription Narcotics:  · Limit your activities  · A responsible adult needs to be with you for the next 24 hours  · Do not drive and operate hazardous machinery  · Do not make important personal or business decisions  · Do not drink alcoholic beverages  · If you have not urinated within 8 hours after discharge, and you are experiencing discomfort from urinary retention, please go to the nearest ED. · If you have sleep apnea and have a CPAP machine, please use it for all naps and sleeping. · Please use caution when taking narcotics and any of your home medications that may cause drowsiness. *  Please give a list of your current medications to your Primary Care Provider. *  Please update this list whenever your medications are discontinued, doses are      changed, or new medications (including over-the-counter products) are added. *  Please carry medication information at all times in case of emergency situations. These are general instructions for a healthy lifestyle:  No smoking/ No tobacco products/ Avoid exposure to second hand smoke  Surgeon General's Warning:  Quitting smoking now greatly reduces serious risk to your health. Obesity, smoking, and sedentary lifestyle greatly increases your risk for illness  A healthy diet, regular physical exercise & weight monitoring are important for maintaining a healthy lifestyle    You may be retaining fluid if you have a history of heart failure or if you experience any of the following symptoms:  Weight gain of 3 pounds or more overnight or 5 pounds in a week, increased swelling in our hands or feet or shortness of breath while lying flat in bed. Please call your doctor as soon as you notice any of these symptoms; do not wait until your next office visit.

## 2021-11-04 ENCOUNTER — HOSPITAL ENCOUNTER (OUTPATIENT)
Age: 76
Setting detail: OBSERVATION
Discharge: HOME | End: 2021-11-06
Attending: EMERGENCY MEDICINE | Admitting: INTERNAL MEDICINE
Payer: MEDICARE

## 2021-11-04 ENCOUNTER — APPOINTMENT (OUTPATIENT)
Dept: GENERAL RADIOLOGY | Age: 76
End: 2021-11-04
Attending: EMERGENCY MEDICINE
Payer: MEDICARE

## 2021-11-04 DIAGNOSIS — E86.1 HYPOTENSION DUE TO HYPOVOLEMIA: ICD-10-CM

## 2021-11-04 DIAGNOSIS — R40.4 TRANSIENT ALTERATION OF AWARENESS: Primary | ICD-10-CM

## 2021-11-04 DIAGNOSIS — I95.89 HYPOTENSION DUE TO HYPOVOLEMIA: ICD-10-CM

## 2021-11-04 PROBLEM — T50.901A ACCIDENTAL DRUG OVERDOSE: Status: ACTIVE | Noted: 2021-11-04

## 2021-11-04 PROBLEM — Z79.899 POLYPHARMACY: Status: ACTIVE | Noted: 2021-11-04

## 2021-11-04 PROBLEM — F33.9 RECURRENT DEPRESSION (HCC): Chronic | Status: ACTIVE | Noted: 2018-01-10

## 2021-11-04 PROBLEM — R41.89 EPISODE OF UNRESPONSIVENESS: Status: ACTIVE | Noted: 2021-11-04

## 2021-11-04 PROBLEM — T40.425A: Status: ACTIVE | Noted: 2021-11-04

## 2021-11-04 LAB
ALBUMIN SERPL-MCNC: 2.9 G/DL (ref 3.2–4.6)
ALBUMIN/GLOB SERPL: 0.9 {RATIO} (ref 1.2–3.5)
ALP SERPL-CCNC: 105 U/L (ref 50–136)
ALT SERPL-CCNC: 10 U/L (ref 12–65)
ANION GAP SERPL CALC-SCNC: 4 MMOL/L (ref 7–16)
AST SERPL-CCNC: 8 U/L (ref 15–37)
ATRIAL RATE: 58 BPM
BACTERIA URNS QL MICRO: ABNORMAL /HPF
BASOPHILS # BLD: 0 K/UL (ref 0–0.2)
BASOPHILS NFR BLD: 1 % (ref 0–2)
BILIRUB SERPL-MCNC: 0.3 MG/DL (ref 0.2–1.1)
BUN SERPL-MCNC: 16 MG/DL (ref 8–23)
CALCIUM SERPL-MCNC: 8.2 MG/DL (ref 8.3–10.4)
CALCULATED P AXIS, ECG09: 45 DEGREES
CALCULATED R AXIS, ECG10: 31 DEGREES
CALCULATED T AXIS, ECG11: 25 DEGREES
CASTS URNS QL MICRO: ABNORMAL /LPF
CHLORIDE SERPL-SCNC: 110 MMOL/L (ref 98–107)
CO2 SERPL-SCNC: 26 MMOL/L (ref 21–32)
CREAT SERPL-MCNC: 0.97 MG/DL (ref 0.8–1.5)
DIAGNOSIS, 93000: NORMAL
DIFFERENTIAL METHOD BLD: ABNORMAL
EOSINOPHIL # BLD: 0.1 K/UL (ref 0–0.8)
EOSINOPHIL NFR BLD: 2 % (ref 0.5–7.8)
EPI CELLS #/AREA URNS HPF: 0 /HPF
ERYTHROCYTE [DISTWIDTH] IN BLOOD BY AUTOMATED COUNT: 14.2 % (ref 11.9–14.6)
GLOBULIN SER CALC-MCNC: 3.3 G/DL (ref 2.3–3.5)
GLUCOSE BLD STRIP.AUTO-MCNC: 86 MG/DL (ref 65–100)
GLUCOSE SERPL-MCNC: 90 MG/DL (ref 65–100)
HCT VFR BLD AUTO: 33.7 % (ref 41.1–50.3)
HGB BLD-MCNC: 10.5 G/DL (ref 13.6–17.2)
IMM GRANULOCYTES # BLD AUTO: 0 K/UL (ref 0–0.5)
IMM GRANULOCYTES NFR BLD AUTO: 0 % (ref 0–5)
LACTATE SERPL-SCNC: 1.8 MMOL/L (ref 0.4–2)
LYMPHOCYTES # BLD: 1.1 K/UL (ref 0.5–4.6)
LYMPHOCYTES NFR BLD: 20 % (ref 13–44)
MCH RBC QN AUTO: 28.8 PG (ref 26.1–32.9)
MCHC RBC AUTO-ENTMCNC: 31.2 G/DL (ref 31.4–35)
MCV RBC AUTO: 92.3 FL (ref 79.6–97.8)
MONOCYTES # BLD: 0.7 K/UL (ref 0.1–1.3)
MONOCYTES NFR BLD: 14 % (ref 4–12)
NEUTS SEG # BLD: 3.4 K/UL (ref 1.7–8.2)
NEUTS SEG NFR BLD: 64 % (ref 43–78)
NRBC # BLD: 0 K/UL (ref 0–0.2)
P-R INTERVAL, ECG05: 136 MS
PLATELET # BLD AUTO: 150 K/UL (ref 150–450)
PMV BLD AUTO: 10.1 FL (ref 9.4–12.3)
POTASSIUM SERPL-SCNC: 3.7 MMOL/L (ref 3.5–5.1)
PROT SERPL-MCNC: 6.2 G/DL (ref 6.3–8.2)
Q-T INTERVAL, ECG07: 458 MS
QRS DURATION, ECG06: 76 MS
QTC CALCULATION (BEZET), ECG08: 449 MS
RBC # BLD AUTO: 3.65 M/UL (ref 4.23–5.6)
RBC #/AREA URNS HPF: ABNORMAL /HPF
SERVICE CMNT-IMP: NORMAL
SODIUM SERPL-SCNC: 140 MMOL/L (ref 138–145)
TSH SERPL DL<=0.005 MIU/L-ACNC: 1.7 UIU/ML (ref 0.36–3.74)
VENTRICULAR RATE, ECG03: 58 BPM
WBC # BLD AUTO: 5.3 K/UL (ref 4.3–11.1)
WBC URNS QL MICRO: ABNORMAL /HPF

## 2021-11-04 PROCEDURE — 82962 GLUCOSE BLOOD TEST: CPT

## 2021-11-04 PROCEDURE — 87086 URINE CULTURE/COLONY COUNT: CPT

## 2021-11-04 PROCEDURE — 81015 MICROSCOPIC EXAM OF URINE: CPT

## 2021-11-04 PROCEDURE — 83605 ASSAY OF LACTIC ACID: CPT

## 2021-11-04 PROCEDURE — 84443 ASSAY THYROID STIM HORMONE: CPT

## 2021-11-04 PROCEDURE — 87088 URINE BACTERIA CULTURE: CPT

## 2021-11-04 PROCEDURE — G0378 HOSPITAL OBSERVATION PER HR: HCPCS

## 2021-11-04 PROCEDURE — 85025 COMPLETE CBC W/AUTO DIFF WBC: CPT

## 2021-11-04 PROCEDURE — 99285 EMERGENCY DEPT VISIT HI MDM: CPT

## 2021-11-04 PROCEDURE — 93005 ELECTROCARDIOGRAM TRACING: CPT | Performed by: EMERGENCY MEDICINE

## 2021-11-04 PROCEDURE — 80053 COMPREHEN METABOLIC PANEL: CPT

## 2021-11-04 PROCEDURE — 71045 X-RAY EXAM CHEST 1 VIEW: CPT

## 2021-11-04 PROCEDURE — 81003 URINALYSIS AUTO W/O SCOPE: CPT

## 2021-11-04 RX ORDER — ONDANSETRON 8 MG/1
4 TABLET, ORALLY DISINTEGRATING ORAL
Status: DISCONTINUED | OUTPATIENT
Start: 2021-11-04 | End: 2021-11-06 | Stop reason: HOSPADM

## 2021-11-04 RX ORDER — SODIUM CHLORIDE 0.9 % (FLUSH) 0.9 %
5-40 SYRINGE (ML) INJECTION EVERY 8 HOURS
Status: DISCONTINUED | OUTPATIENT
Start: 2021-11-05 | End: 2021-11-06 | Stop reason: HOSPADM

## 2021-11-04 RX ORDER — ACETAMINOPHEN 650 MG/1
650 SUPPOSITORY RECTAL
Status: DISCONTINUED | OUTPATIENT
Start: 2021-11-04 | End: 2021-11-06 | Stop reason: HOSPADM

## 2021-11-04 RX ORDER — CELECOXIB 200 MG/1
200 CAPSULE ORAL DAILY
Status: DISCONTINUED | OUTPATIENT
Start: 2021-11-05 | End: 2021-11-05

## 2021-11-04 RX ORDER — METOPROLOL SUCCINATE 25 MG/1
50 TABLET, EXTENDED RELEASE ORAL DAILY
Status: DISCONTINUED | OUTPATIENT
Start: 2021-11-05 | End: 2021-11-05

## 2021-11-04 RX ORDER — FACIAL-BODY WIPES
10 EACH TOPICAL DAILY PRN
Status: DISCONTINUED | OUTPATIENT
Start: 2021-11-04 | End: 2021-11-06 | Stop reason: HOSPADM

## 2021-11-04 RX ORDER — ONDANSETRON 2 MG/ML
4 INJECTION INTRAMUSCULAR; INTRAVENOUS
Status: DISCONTINUED | OUTPATIENT
Start: 2021-11-04 | End: 2021-11-06 | Stop reason: HOSPADM

## 2021-11-04 RX ORDER — KETOROLAC TROMETHAMINE 10 MG/1
10 TABLET, FILM COATED ORAL
Status: DISCONTINUED | OUTPATIENT
Start: 2021-11-04 | End: 2021-11-06 | Stop reason: HOSPADM

## 2021-11-04 RX ORDER — FAMOTIDINE 20 MG/1
20 TABLET, FILM COATED ORAL
Status: DISCONTINUED | OUTPATIENT
Start: 2021-11-04 | End: 2021-11-06 | Stop reason: HOSPADM

## 2021-11-04 RX ORDER — SERTRALINE HYDROCHLORIDE 100 MG/1
100 TABLET, FILM COATED ORAL 2 TIMES DAILY
Status: DISCONTINUED | OUTPATIENT
Start: 2021-11-05 | End: 2021-11-06 | Stop reason: HOSPADM

## 2021-11-04 RX ORDER — GABAPENTIN 300 MG/1
300 CAPSULE ORAL 2 TIMES DAILY
Status: DISCONTINUED | OUTPATIENT
Start: 2021-11-05 | End: 2021-11-06 | Stop reason: HOSPADM

## 2021-11-04 RX ORDER — MAG HYDROX/ALUMINUM HYD/SIMETH 200-200-20
15 SUSPENSION, ORAL (FINAL DOSE FORM) ORAL
Status: DISCONTINUED | OUTPATIENT
Start: 2021-11-04 | End: 2021-11-06 | Stop reason: HOSPADM

## 2021-11-04 RX ORDER — DEXTROAMPHETAMINE SACCHARATE, AMPHETAMINE ASPARTATE, DEXTROAMPHETAMINE SULFATE AND AMPHETAMINE SULFATE 2.5; 2.5; 2.5; 2.5 MG/1; MG/1; MG/1; MG/1
30 TABLET ORAL 2 TIMES DAILY
Status: DISCONTINUED | OUTPATIENT
Start: 2021-11-05 | End: 2021-11-06 | Stop reason: HOSPADM

## 2021-11-04 RX ORDER — TAMSULOSIN HYDROCHLORIDE 0.4 MG/1
0.8 CAPSULE ORAL DAILY
Status: DISCONTINUED | OUTPATIENT
Start: 2021-11-05 | End: 2021-11-06 | Stop reason: HOSPADM

## 2021-11-04 RX ORDER — ENOXAPARIN SODIUM 100 MG/ML
40 INJECTION SUBCUTANEOUS DAILY
Status: DISCONTINUED | OUTPATIENT
Start: 2021-11-05 | End: 2021-11-06 | Stop reason: HOSPADM

## 2021-11-04 RX ORDER — POLYETHYLENE GLYCOL 3350 17 G/17G
17 POWDER, FOR SOLUTION ORAL DAILY PRN
Status: DISCONTINUED | OUTPATIENT
Start: 2021-11-04 | End: 2021-11-06 | Stop reason: HOSPADM

## 2021-11-04 RX ORDER — SODIUM CHLORIDE 0.9 % (FLUSH) 0.9 %
5-40 SYRINGE (ML) INJECTION AS NEEDED
Status: DISCONTINUED | OUTPATIENT
Start: 2021-11-04 | End: 2021-11-06 | Stop reason: HOSPADM

## 2021-11-04 RX ORDER — AMLODIPINE BESYLATE 5 MG/1
5 TABLET ORAL DAILY
Status: DISCONTINUED | OUTPATIENT
Start: 2021-11-05 | End: 2021-11-05

## 2021-11-04 RX ORDER — ACETAMINOPHEN 325 MG/1
650 TABLET ORAL
Status: DISCONTINUED | OUTPATIENT
Start: 2021-11-04 | End: 2021-11-06 | Stop reason: HOSPADM

## 2021-11-04 RX ORDER — PANTOPRAZOLE SODIUM 40 MG/1
40 TABLET, DELAYED RELEASE ORAL
Status: DISCONTINUED | OUTPATIENT
Start: 2021-11-05 | End: 2021-11-06 | Stop reason: HOSPADM

## 2021-11-04 NOTE — ED TRIAGE NOTES
Pt arrives per EMS from home. Wife called 47 thinking  was dead when she couldn't wake him up. Upon EMS arrival Bp 60/p and pt very slow to respond. Pupils were pinpoint. PT was given 0.5mg Narcan and 900ml NS and pt is now awake, responding and color looks better per EMS. Per EMS pt did have an episode where his HR dropped to 40. Pt states he took Tramadol and Flexeril today for left arm/shoulder pain. Pt also complains of diarrhea.

## 2021-11-05 PROBLEM — E43 SEVERE PROTEIN-CALORIE MALNUTRITION (HCC): Status: ACTIVE | Noted: 2021-11-05

## 2021-11-05 PROBLEM — R55 SYNCOPE: Status: ACTIVE | Noted: 2021-11-04

## 2021-11-05 PROBLEM — M25.532 LEFT WRIST PAIN: Status: ACTIVE | Noted: 2021-11-05

## 2021-11-05 LAB
ANION GAP SERPL CALC-SCNC: 4 MMOL/L (ref 7–16)
BASOPHILS # BLD: 0 K/UL (ref 0–0.2)
BASOPHILS NFR BLD: 1 % (ref 0–2)
BUN SERPL-MCNC: 13 MG/DL (ref 8–23)
CALCIUM SERPL-MCNC: 8.9 MG/DL (ref 8.3–10.4)
CHLORIDE SERPL-SCNC: 111 MMOL/L (ref 98–107)
CO2 SERPL-SCNC: 28 MMOL/L (ref 21–32)
CREAT SERPL-MCNC: 0.87 MG/DL (ref 0.8–1.5)
DIFFERENTIAL METHOD BLD: ABNORMAL
EOSINOPHIL # BLD: 0.1 K/UL (ref 0–0.8)
EOSINOPHIL NFR BLD: 2 % (ref 0.5–7.8)
ERYTHROCYTE [DISTWIDTH] IN BLOOD BY AUTOMATED COUNT: 14.2 % (ref 11.9–14.6)
GLUCOSE SERPL-MCNC: 92 MG/DL (ref 65–100)
HCT VFR BLD AUTO: 33.4 % (ref 41.1–50.3)
HGB BLD-MCNC: 10.7 G/DL (ref 13.6–17.2)
IMM GRANULOCYTES # BLD AUTO: 0 K/UL (ref 0–0.5)
IMM GRANULOCYTES NFR BLD AUTO: 0 % (ref 0–5)
LYMPHOCYTES # BLD: 1.4 K/UL (ref 0.5–4.6)
LYMPHOCYTES NFR BLD: 34 % (ref 13–44)
MCH RBC QN AUTO: 28.7 PG (ref 26.1–32.9)
MCHC RBC AUTO-ENTMCNC: 32 G/DL (ref 31.4–35)
MCV RBC AUTO: 89.5 FL (ref 79.6–97.8)
MONOCYTES # BLD: 0.4 K/UL (ref 0.1–1.3)
MONOCYTES NFR BLD: 11 % (ref 4–12)
NEUTS SEG # BLD: 2.2 K/UL (ref 1.7–8.2)
NEUTS SEG NFR BLD: 53 % (ref 43–78)
NRBC # BLD: 0 K/UL (ref 0–0.2)
PLATELET # BLD AUTO: 145 K/UL (ref 150–450)
PMV BLD AUTO: 9.6 FL (ref 9.4–12.3)
POTASSIUM SERPL-SCNC: 3.7 MMOL/L (ref 3.5–5.1)
RBC # BLD AUTO: 3.73 M/UL (ref 4.23–5.6)
SODIUM SERPL-SCNC: 143 MMOL/L (ref 136–145)
WBC # BLD AUTO: 4.2 K/UL (ref 4.3–11.1)

## 2021-11-05 PROCEDURE — 99222 1ST HOSP IP/OBS MODERATE 55: CPT | Performed by: PHYSICIAN ASSISTANT

## 2021-11-05 PROCEDURE — 74011250637 HC RX REV CODE- 250/637: Performed by: INTERNAL MEDICINE

## 2021-11-05 PROCEDURE — 74011250636 HC RX REV CODE- 250/636: Performed by: FAMILY MEDICINE

## 2021-11-05 PROCEDURE — 96374 THER/PROPH/DIAG INJ IV PUSH: CPT

## 2021-11-05 PROCEDURE — 97161 PT EVAL LOW COMPLEX 20 MIN: CPT

## 2021-11-05 PROCEDURE — 36415 COLL VENOUS BLD VENIPUNCTURE: CPT

## 2021-11-05 PROCEDURE — 74011000258 HC RX REV CODE- 258: Performed by: FAMILY MEDICINE

## 2021-11-05 PROCEDURE — 96376 TX/PRO/DX INJ SAME DRUG ADON: CPT

## 2021-11-05 PROCEDURE — 96372 THER/PROPH/DIAG INJ SC/IM: CPT

## 2021-11-05 PROCEDURE — 97110 THERAPEUTIC EXERCISES: CPT

## 2021-11-05 PROCEDURE — G0378 HOSPITAL OBSERVATION PER HR: HCPCS

## 2021-11-05 PROCEDURE — 80048 BASIC METABOLIC PNL TOTAL CA: CPT

## 2021-11-05 PROCEDURE — 74011250636 HC RX REV CODE- 250/636: Performed by: INTERNAL MEDICINE

## 2021-11-05 PROCEDURE — 85025 COMPLETE CBC W/AUTO DIFF WBC: CPT

## 2021-11-05 RX ADMIN — ENOXAPARIN SODIUM 40 MG: 100 INJECTION SUBCUTANEOUS at 08:29

## 2021-11-05 RX ADMIN — METOPROLOL SUCCINATE 50 MG: 25 TABLET, EXTENDED RELEASE ORAL at 08:30

## 2021-11-05 RX ADMIN — GABAPENTIN 300 MG: 300 CAPSULE ORAL at 17:02

## 2021-11-05 RX ADMIN — Medication 10 ML: at 21:36

## 2021-11-05 RX ADMIN — Medication 10 ML: at 06:07

## 2021-11-05 RX ADMIN — BUSPIRONE HYDROCHLORIDE 15 MG: 10 TABLET ORAL at 08:30

## 2021-11-05 RX ADMIN — DEXTROAMPHETAMINE SACCHARATE, AMPHETAMINE ASPARTATE, DEXTROAMPHETAMINE SULFATE, AMPHETAMINE SULFATE TABLETS, 10 MG,CLL 30 MG: 2.5; 2.5; 2.5; 2.5 TABLET ORAL at 08:29

## 2021-11-05 RX ADMIN — PANTOPRAZOLE SODIUM 40 MG: 40 TABLET, DELAYED RELEASE ORAL at 06:07

## 2021-11-05 RX ADMIN — ACETAMINOPHEN 650 MG: 325 TABLET ORAL at 08:36

## 2021-11-05 RX ADMIN — SERTRALINE 100 MG: 100 TABLET, FILM COATED ORAL at 17:02

## 2021-11-05 RX ADMIN — ACETAMINOPHEN 650 MG: 325 TABLET ORAL at 21:50

## 2021-11-05 RX ADMIN — CEFTRIAXONE 1 G: 1 INJECTION, POWDER, FOR SOLUTION INTRAMUSCULAR; INTRAVENOUS at 00:38

## 2021-11-05 RX ADMIN — Medication 10 ML: at 15:30

## 2021-11-05 RX ADMIN — TAMSULOSIN HYDROCHLORIDE 0.8 MG: 0.4 CAPSULE ORAL at 08:30

## 2021-11-05 RX ADMIN — SERTRALINE 100 MG: 100 TABLET, FILM COATED ORAL at 08:30

## 2021-11-05 RX ADMIN — ACETAMINOPHEN 650 MG: 325 TABLET ORAL at 15:45

## 2021-11-05 RX ADMIN — Medication 10 ML: at 00:38

## 2021-11-05 RX ADMIN — DEXTROAMPHETAMINE SACCHARATE, AMPHETAMINE ASPARTATE, DEXTROAMPHETAMINE SULFATE, AMPHETAMINE SULFATE TABLETS, 10 MG,CLL 30 MG: 2.5; 2.5; 2.5; 2.5 TABLET ORAL at 17:03

## 2021-11-05 NOTE — PROGRESS NOTES
Pt skin is dry and intact. Pt has no redness on sacrum or heels. Pt has scar on back from previous back surgery.

## 2021-11-05 NOTE — PROGRESS NOTES
Pt admitted for episode unresponsiveness. Bradycardia. Care Management Interventions  PCP Verified by CM: Yes (Damari Goodwin)  Last Visit to PCP: 08/25/21  Discharge Durable Medical Equipment: No  Physical Therapy Consult: No  Occupational Therapy Consult: No  Support Systems: Spouse/Significant Other  Discharge Location  Discharge Placement: Home  Chart screened by  for discharge planning. No needs identified at this time. Please consult  if any new issues arise.

## 2021-11-05 NOTE — PROGRESS NOTES
Problem: Falls - Risk of  Goal: *Absence of Falls  Description: Document Norbertleen Freeze Fall Risk and appropriate interventions in the flowsheet.   Outcome: Progressing Towards Goal  Note: Fall Risk Interventions:  Mobility Interventions: Patient to call before getting OOB         Medication Interventions: Teach patient to arise slowly                   Problem: Patient Education: Go to Patient Education Activity  Goal: Patient/Family Education  Outcome: Progressing Towards Goal

## 2021-11-05 NOTE — PROGRESS NOTES
Comprehensive Nutrition Assessment    Type and Reason for Visit: Initial, Positive nutrition screen  Best Practice Alert for Malnutrition Screening Tool: Recently Lost Weight Without Trying: Yes, If Yes, How Much Weight Loss: 14 - 23 lbs, Eating Poorly Due to Decreased Appetite: Yes    Nutrition Recommendations/Plan:    Continue with current diet   Start Ensure enlive with all meals (1 bottle provides 350 kcals and 20 g protein)     Malnutrition Assessment:  Malnutrition Status: Severe malnutrition  Context: Social/environmental circumstances  Findings of clinical characteristics of malnutrition:   Energy Intake:     Weight Loss:  7.00 - Greater than 10% over 6 months     Body Fat Loss:  7 - Severe body fat loss, Triceps   Muscle Mass Loss:  7 - Severe muscle mass loss, Clavicles (pectoralis &deltoids), Temples (temporalis)    Nutrition Assessment:   Nutrition History: Patient reports due to being limit mobility he is unable to cook much for himself. He reports his wife does not cook either. Son grocery shops for them. He eats mostly tv dinners, and about 2-3 times per week son brings fresh cooked meals. Weight loss of 30 lbs in 1 year      Nutrition Background: Patient admitted after being found unresponsive by wife. Hx: narcolepsy  Daily Update:  Spoke with patient who reports he eats okay at home but lots of junk food. He reports he and wife do not cook so eat mostly tv dinners. Patient unclear how often he eats in a day, but potentially only once per day. It sounds like son tries to help but not consistent. Patient physically malnourished and not surprised he has lost weight.      Nutrition Related Findings:   Severe muscle wasting and fat loss      Current Nutrition Therapies:  ADULT DIET Regular    Current Intake:   Average Meal Intake: Unable to assess Average Supplement Intake: None ordered      Anthropometric Measures:  Height: 5' 10\" (177.8 cm)  Current Body Wt: 71.1 kg (156 lb 12 oz) (11/5), Weight source: Bed scale  BMI: 22.5, Normal weight (BMI 22.0-24.9) age over 72     Ideal Body Weight (lbs) (Calculated): 166 lbs (75 kg), 94.4 %  Usual Body Wt: 79.4 kg (175 lb) (4/2021 per EMR and per patient), Percent weight change: -10.4          Edema: No data recorded   Estimated Daily Nutrient Needs:  Energy (kcal/day): 4299-9260 (Kcal/kg (20-25), Weight Used: Current (71.1 kg))  Protein (g/day): 71-89g (20% kcals) Weight Used: (Current)  Fluid (ml/day):   (1 ml/kcal)    Nutrition Diagnosis:   · Unintended weight loss related to inadequate protein-energy intake, lack or limited access to food as evidenced by poor intake prior to admission (pt reports limited ability to cook, shop)    · Severe malnutrition, In context of social or environmental circumstances related to inadequate protein-energy intake as evidenced by weight loss greater than or equal to 10% in 6 months, severe muscle loss, severe loss of subcutaneous fat, poor intake prior to admission    Nutrition Interventions:   Food and/or Nutrient Delivery: Continue current diet, Start oral nutrition supplement     Coordination of Nutrition Care: Continue to monitor while inpatient    Goals:       Active Goal: Intake >75% of nutritional needs by follow-up    Nutrition Monitoring and Evaluation:      Food/Nutrient Intake Outcomes: Food and nutrient intake, Supplement intake  Physical Signs/Symptoms Outcomes: Weight    Discharge Planning:    Continue oral nutrition supplement    Electronically signed by Shiraz Kohler MS, RD, LD on 11/5/2021 at 3:23 PM.

## 2021-11-05 NOTE — PROGRESS NOTES
Bedside shift change report given to [facility] (oncoming nurse) by self (offgoing nurse).  Report included the following information SBAR, Kardex, Procedure Summary, Intake/Output, MAR and Cardiac Rhythm SR, SB.

## 2021-11-05 NOTE — PROGRESS NOTES
Problem: Falls - Risk of  Goal: *Absence of Falls  Description: Document Felipe Yanez Fall Risk and appropriate interventions in the flowsheet. Outcome: Progressing Towards Goal  Note: Fall Risk Interventions:  Mobility Interventions: Communicate number of staff needed for ambulation/transfer, Patient to call before getting OOB, Strengthening exercises (ROM-active/passive), Utilize walker, cane, or other assistive device         Medication Interventions: Assess postural VS orthostatic hypotension, Patient to call before getting OOB, Teach patient to arise slowly         History of Falls Interventions:  Investigate reason for fall, Room close to nurse's station

## 2021-11-05 NOTE — PROGRESS NOTES
TRANSFER - IN REPORT:    Verbal report received from Rosy(name) on Genet Vishal Pittman  being received from ER(unit) for routine progression of care      Report consisted of patients Situation, Background, Assessment and   Recommendations(SBAR). Information from the following report(s) SBAR, Kardex, Procedure Summary, Intake/Output, MAR and Cardiac Rhythm SB was reviewed with the receiving nurse. Opportunity for questions and clarification was provided. Assessment completed upon patients arrival to unit and care assumed.

## 2021-11-05 NOTE — PROGRESS NOTES
ACUTE PHYSICAL THERAPY GOALS:  (Developed with and agreed upon by patient and/or caregiver.)  1. Patient will transfer from bed to chair with set up and SBA within 3 days. 2.  Patient will demonstrate independence with HEP within 3 days. PHYSICAL THERAPY ASSESSMENT: Initial Assessment and Daily Note PT Treatment Day # 1      Emilia Hunter is a 77 y.o. male   PRIMARY DIAGNOSIS: Syncope  Episode of unresponsiveness [R41.89]       Reason for Referral:     ICD-10: Treatment Diagnosis: Other abnormalities of gait and mobility (R26.89)  OBSERVATION: Payor: [employer] MEDICARE / Plan: [facility] [employer] MEDICARE CHOICE PPO/PFFS / Product Type: Managed Care Medicare /     ASSESSMENT:     REHAB RECOMMENDATIONS:   Recommendation to date pending progress:  Setting:   No further skilled therapy   Equipment:    None     PRIOR LEVEL OF FUNCTION:  (Prior to Hospitalization) INITIAL/CURRENT LEVEL OF FUNCTION:  (Most Recently Demonstrated)   Bed Mobility:   Modified Independent  Sit to Stand:   Modified Independent  Transfers:   Modified Independent  Gait/Mobility:   Modified Independent Bed Mobility:   Modified Independent  Sit to Stand:   Not tested  Transfers:   Minimal Assistance  Gait/Mobility:   Unable to perform     ASSESSMENT:  Mr. Rand Hines lives with his wife. He wears B AFOs and uses a w/c for mobility. He is modified independent with bed mobility and squat pivot transfers to w/c. Patient extremely talkative and requires frequent cueing to stay on topic. Patient able to transfer to recliner at bedside with min A to block feet. If patient were wearing his AFO's and shoes, he might be at baseline. Mr. Rand Hines would benefit from skilled physical therapy (medically necessary) to address his deficits and maximize his function. SUBJECTIVE:   Mr. Rand Hines states, \"I always hurt there. \"    SOCIAL HISTORY/LIVING ENVIRONMENT: Mr. Rand Hines lives with his wife. He wears B AFOs and uses a w/c for mobility.   He is modified independent with bed mobility and squat pivot transfers to w/c  Home Environment: Trailer/mobile home  One/Two Story Residence: One story  Living Alone: No  Support Systems: Spouse/Significant Other  OBJECTIVE:     PAIN: VITAL SIGNS: LINES/DRAINS:   Pre Treatment: Pain Screen  Pain Scale 1: Numeric (0 - 10)  Pain Intensity 1: 4  Pain Onset 1: chronic  Pain Location 1: Abdomen  Pain Orientation 1: Left  Pain Intervention(s) 1: Repositioned  Post Treatment: 4   monitor  O2 Device: None (Room air)     GROSS EVALUATION:  B LE's Within Functional Limits Abnormal/ Functional Abnormal/ Non-Functional (see comments) Not Tested Comments:   AROM [] [x] [] [] Hips/knees WFL, ankles nonfunctional   PROM [] [] [] []    Strength [] [x] [] [] Hips/knees WFL, ankles nonfunctional   Balance [x] [] [] [] sitting   Posture [] [x] [] []    Sensation [] [] [x] []    Coordination [] [] [] []    Tone [] [] [] []    Edema [] [] [] []    Activity Tolerance [] [x] [] []     [] [] [] []      COGNITION/  PERCEPTION: Intact Impaired   (see comments) Comments:   Orientation [x] []    Vision [x] []    Hearing [x] []    Command Following [x] []    Safety Awareness [x] []     [] []      MOBILITY: I Mod I S SBA CGA Min Mod Max Total  NT x2 Comments:   Bed Mobility    Rolling [] [x] [] [] [] [] [] [] [] [] []    Supine to Sit [] [x] [] [] [] [] [] [] [] [] []    Scooting [] [x] [] [] [] [] [] [] [] [] []    Sit to Supine [] [] [] [] [] [] [] [] [] [] []    Transfers    Sit to Stand [] [] [] [] [] [] [] [] [] [x] []    Bed to Chair [] [] [] [] [] [x] [] [] [] [] []    Stand to Sit [] [] [] [] [] [] [] [] [] [x] []    I=Independent, Mod I=Modified Independent, S=Supervision, SBA=Standby Assistance, CGA=Contact Guard Assistance,   Min=Minimal Assistance, Mod=Moderate Assistance, Max=Maximal Assistance, Total=Total Assistance, NT=Not Tested  GAIT: I Mod I S SBA CGA Min Mod Max Total  NT x2 Comments:   Level of Assistance [] [] [] [] [] [] [] [] [] [x] [] Distance 0    DME N/A    Gait Quality N/A    Weightbearing Status N/A     I=Independent, Mod I=Modified Independent, S=Supervision, SBA=Standby Assistance, CGA=Contact Guard Assistance,   Min=Minimal Assistance, Mod=Moderate Assistance, Max=Maximal Assistance, Total=Total Assistance, NT=Not Tested    [address] [place] [address] AM-PAC [facility] Form       How much difficulty does the patient currently have. .. Unable A Lot A Little None   1. Turning over in bed (including adjusting bedclothes, sheets and blankets)? [] 1   [] 2   [] 3   [x] 4   2. Sitting down on and standing up from a chair with arms ( e.g., wheelchair, bedside commode, etc.)   [] 1   [x] 2   [] 3   [] 4   3. Moving from lying on back to sitting on the side of the bed? [] 1   [] 2   [] 3   [x] 4   How much help from another person does the patient currently need. .. Total A Lot A Little None   4. Moving to and from a bed to a chair (including a wheelchair)? [] 1   [] 2   [x] 3   [] 4   5. Need to walk in hospital room? [x] 1   [] 2   [] 3   [] 4   6. Climbing 3-5 steps with a railing? [x] 1   [] 2   [] 3   [] 4   © 2007, Trustees of [address] [place] [address], under license to [employer]. All rights reserved     Score:  Initial: 15 Most Recent: X (Date: -- )    Interpretation of Tool:  Represents activities that are increasingly more difficult (i.e. Bed mobility, Transfers, Gait). PLAN:   FREQUENCY/DURATION: PT Plan of Care: 3 times/week for duration of hospital stay or until stated goals are met, whichever comes first.    PROBLEM LIST:   (Skilled intervention is medically necessary to address:)  1. Decreased Transfer Abilities  2. Increased Pain   INTERVENTIONS PLANNED:   (Benefits and precautions of physical therapy have been discussed with the patient.)  1. Therapeutic Activity  2. Therapeutic Exercise/HEP  3. Neuromuscular Re-education  4. Gait Training  5. Manual Therapy  6.  Education     TREATMENT: EVALUATION: Low Complexity : (Untimed Charge)    TREATMENT:   (     )  Therapeutic Exercise (10 Minutes): Therapeutic exercises noted below to improve functional activity tolerance and strength.      TREATMENT GRID:  N/A   DATE: 11/5/21       Ambulation        Hip Flexion x25 AB       Long Arc Quads x25 AB       Hip ab/ad        Heel Raises        Toe Raises                                 Key:  A=active, AA=active assisted, P=passive, B=bilaterally, R=right, L=left   DF=dorsiflexion, PF=plantarflexion    AFTER TREATMENT POSITION/PRECAUTIONS:  Chair, Needs within reach and RN notified    INTERDISCIPLINARY COLLABORATION:  MD/PA/NP, RN/PCT and PT/PTA    TOTAL TREATMENT DURATION:  PT Patient Time In/Time Out  Time In: 1328  Time Out: 1842 Alicia King 149, PT, DPT

## 2021-11-05 NOTE — CONSULTS
[facility]  Consultation Note    Patient ID:  Name: Margareth Lugo  MRN: [id number]  AGE: 77 y.o.  : 1945    Date of Consultation:  2021  Referring Physician:  Hospitalist     Subjective: Pt complains of left hand numbness and shooting pain that is localized to the  C8 distribution. He reports pain that started since he has been here in the hospital. He has has a history of chronic neck pain and multiple neck surgeries by both Dr. Sarah Kaufman and Dr. Virgil Tejeda. During this admission though the presented to the [facility] Emergency Dept on  for unresponsiveness. They were admitted by the hospitalist and felt that the episode of unresponsvieness was related to narcotic use. He also has a history of narcolepsy and takes adderall for this. Past Medical History Includes:   Past Medical History:   Diagnosis Date    Allergic rhinitis, cause unspecified 3/8/2013    Backache 2013      The patient is stable on the current treatment. Tolerating well. No sides effects. Will not adjust at this time.  CAD (coronary artery disease) 2013    MI age 62- no intervention; denies CP/SOB. unable to ambulate due to back hx    Cancer [facility])     prostate    Cardiomegaly 3/14/2017    Cervical spinal stenosis 10/3/2016    Followed by Dr. Greer Carrel. Last Assessment & Plan:  MRI discussed with the patient, patient is following with Dr. Sarah Kaufman for further assessment and consideration of surgery.  Chest pain     Chronic bilateral low back pain with right-sided sciatica 2019    Chronic pain     lumbar pain    Chronic right SI joint pain 2019    Closed T10 fracture ([address] 2019    Depression     Dizziness 3/8/2013    Dyslipidemia     Esophageal stenosis 2013    dxed on EGD early 's.   Never dilated    Esophagitis 1/3/2013    GERD with esophagitis     not completely controlled with zantac    HTN (hypertension) 1/3/2013    controlled with med    Kidney stones 1/3/2013    Narcolepsy 1/3/2013    Prostate cancer ([facility] [address] 1/3/2013    Vertigo, benign positional    ,   Past Surgical History:   Procedure Laterality Date    EGD  5/7/2021         HX BACK SURGERY  03/05/2020    T2-T5 POSTEROLATERAL FUSION W/ ALLOGRAFT LOCAL AUTOGRAFT, REVISION OF HARDWARE    HX CARPAL TUNNEL RELEASE Bilateral     Right (1995)   Left (2004)    HX CHOLECYSTECTOMY  2008?  HX LITHOTRIPSY      HX LUMBAR LAMINECTOMY      x6 Dr. Rodo Holliday  2008?     Dr. Emilee Sarah     Family History:   Family History   Problem Relation Age of Onset    Depression Mother     Alcohol abuse Father     Depression Sister     Depression Brother     Depression Brother     Depression Sister     Depression Sister       Social History:   Social History     Tobacco Use    Smoking status: Never Smoker    Smokeless tobacco: Never Used   Substance Use Topics    Alcohol use: No       ALLERGIES:   Allergies   Allergen Reactions    Tramadol Other (comments)     unresponsive        Patient Medications    Current Facility-Administered Medications   Medication Dose Route Frequency    busPIRone (BUSPAR) tablet 15 mg  15 mg Oral DAILY    dextroamphetamine-amphetamine (ADDERALL) tablet 30 mg  30 mg Oral BID    gabapentin (NEURONTIN) capsule 300 mg  300 mg Oral BID    pantoprazole (PROTONIX) tablet 40 mg  40 mg Oral ACB    sertraline (ZOLOFT) tablet 100 mg  100 mg Oral BID    tamsulosin (FLOMAX) capsule 0.8 mg  0.8 mg Oral DAILY    sodium chloride (NS) flush 5-40 mL  5-40 mL IntraVENous Q8H    sodium chloride (NS) flush 5-40 mL  5-40 mL IntraVENous PRN    acetaminophen (TYLENOL) tablet 650 mg  650 mg Oral Q6H PRN    Or    acetaminophen (TYLENOL) suppository 650 mg  650 mg Rectal Q6H PRN    polyethylene glycol (MIRALAX) packet 17 g  17 g Oral DAILY PRN    bisacodyL (DULCOLAX) suppository 10 mg  10 mg Rectal DAILY PRN    ondansetron (ZOFRAN ODT) tablet 4 mg  4 mg Oral Q8H PRN    Or    ondansetron (ZOFRAN) injection 4 mg  4 mg IntraVENous Q6H PRN    famotidine (PEPCID) tablet 20 mg  20 mg Oral BID PRN    alum-mag hydroxide-simeth (MYLANTA) oral suspension 15 mL  15 mL Oral Q6H PRN    enoxaparin (LOVENOX) injection 40 mg  40 mg SubCUTAneous DAILY    ketorolac (TORADOL) tablet 10 mg  10 mg Oral Q6H PRN    cefTRIAXone (ROCEPHIN) 1 g in 0.9% sodium chloride (MBP/ADV) 50 mL MBP  1 g IntraVENous Q24H         Review of Systems:  A comprehensive review of systems was negative except for that written in the HPI. Physical Exam:      General: NAD, Alert, Oriented x 3   Mental Status: Appropriate   Psych: Normal Affect, Normal Mood    HEENT: Normal Cephalic/Atraumatic, PERRL   Lungs: Respirations even and unlabored, Breath Sounds were clear, no respiratory distress   Heart: Regular Rate and Rhythm   Vascular: Distal pulses intact, good capillary refill   Skin: No redness, No Rashes, Skin is dry   Musculoskeletal: The patient has full active and passive ROM of the left shoulder. He has no point tenderness of the shoulder. There is no acute pain on rotation of the neck. The patient has good  strength in the left hand. There is a right hand extension contracture. All digits are perfusing well.    Lymphatic: No lympahdenopathy, No distal edema   Neuro: No gross deficits   Abdomen: Soft, Non tender, No distension      VITALS:   Patient Vitals for the past 8 hrs:   BP Temp Pulse Resp SpO2 Height   21 1512      5' 10\" (1.778 m)   21 1124 127/74 97.8 °F (36.6 °C) 63 18 97 %     , Temp (24hrs), Av.6 °F (36.4 °C), Min:97.2 °F (36.2 °C), Max:97.9 °F (36.6 °C)           Diagnosis   Patient Active Problem List   Diagnosis Code    Prostate cancer ([facility] [address] C61    HTN (hypertension) I10    Esophagitis K20.90    Kidney stones N20.0    Narcolepsy G47.419    Foot drop, right M21.371    Knee pain M25.569    Esophageal stenosis K22.2    Hyperlipidemia E78.5    Spinal stenosis of lumbar region with radiculopathy M48.061, M54.16    Venous stasis dermatitis of left lower extremity I87.2    Preop cardiovascular exam Z01.810    Recurrent depression (HCC) F33.9    Pharyngoesophageal dysphagia R13.14    UTI (urinary tract infection) N39.0    Hypotension I95.9    Lactic acidosis E87.2    Syncope R55    Adverse effect of tramadol T40.425A    Polypharmacy Z79.899    Left wrist pain M25.532          Assessment     Chronic right hand contracture   Chronic neck pain with cervical radiculopathy     Medical Decision Making:     Chart have been reviewed. The patient has a chronic right hand contracture and has never seen a hand surgeon for this. I recommended seeing one of our hand surgeons at [facility] upon discharge to discuss treatment options for this. With regard to the chronic neck pain and radiculopathy. Dr. [facility] is his neurosurgeon and I think it is most appropriate for her to continue to manage this. There are no signs of acute change with regard to his neck. I recommended outpatient follow up with Dr. [facility]. He can resume his gabapentin that sounds like it was inadvertently held and is likely the reason he became symptomatic again with the cervical radiculopathy. I do not think he is having carpal tunnel syndrome and I think the left hand and arm symptoms are from being off his gabapentin. He should avoid narcotic pain medication. Call if needed.      RICKY Costello  11/5/2021,  4:01 PM

## 2021-11-05 NOTE — PROGRESS NOTES
Hospitalist Progress Note   Admit Date:  2021  6:59 PM   Name:  Jai Cousin   Age:  77 y.o. Sex:  male  :  1945   MRN:  [id number]   Room:  [facility]/01    Presenting Complaint: Altered mental status    Reason(s) for Admission: Episode of unresponsiveness [R41.89]     Hospital Course & Interval History:    77 y.o. male with medical history of narcolepsy, HTN, depression, wheelchair bound, who presented with episode of unresponsiveness. Pt was sitting at home watching TV around 5pm when he apparently fell asleep. Wife thinks he was \"nodding off\". He stated he had chronic shoulder pain and had worsening L wrist pain , took one flexeril and tramadol around 3:30 prior to this event. Wife tried to wake him up but was unable to and pt appeared pale and diaphoretic. He was bradycardic and hypotensive per EMS eval. However he had very rapid response to narcan and returned to baseline status. He was back to baseline in ER. He says he does take his adderall for narcolepsy. In ED, UA+ bacteriuria but otherwise, unremarkable CXR. CBC/CMP at baseline. Per chart review, he was admitted for similar episode a year ago and responded to IVF. Subjective (21):    Pt very alert and oriented in the room x3. His main complaint today was L arm pain localized to his wrist described as numbness and tingling. He also complained of shoulder pain that has been chronic for years. He has had surgery on his b/l wrists for carpal tunnel in the past. Stated he was planning on going to the ED to get this treated if he is not able to see his PCP. He has Lortab on med list but has not taken in 6 months. Takes Tramadol for the first time in months and event yesterday happened. He denies palpitations, SOB, chest pain, abdominal pain, N/V, headache, lightheadedness or dizziness. Assessment & Plan:     Syncope  Took Tramadol prior to episode.  Response to narcan suggests etiology narcotic but don't think this adequately explains bradycardia. TSH wnl. Suspect reflex syncope. Cont cardiac monitor--uneventful per telemetry  Should avoid narcotics  PT/OT--pending  Consult Cardiology for outpt 30d Holter monitor    HTN  hold home Norvasc and Metoprolol for now given episode of hypotension    Narcolepsy  cont home adderall    Recurrent depression   cont home sertraline, Buspar    Polypharmacy   Avoid narcotics    Bacteriuria  Rocephin for 3 doses  UCx pending    L wrist pain, Shoulder pain, chronic  Suspect 2/2 carpal tunnel vs cervical spine DDD. +Phalen's test on L wrist, negative Tinel test  Needs outpt follow up with Ortho, will consult at pt's request        Dispo/Discharge Planning:      Tomorrow if stable    I spent 35 minutes of time caring for this patient at bedside or nearby, more than 50 percent of which was spent on coordination of care and/or counseling regarding the disease process, treatment options, and treatment plan. Diet:  ADULT DIET Regular  DVT PPx: Lovenox SQ  Code status: Full Code    Hospital Problems as of 11/5/2021 Date Reviewed: 5/7/2021          Codes Class Noted - Resolved POA    * (Principal) Episode of unresponsiveness ICD-10-CM: R41.89  ICD-9-CM: 780.09  11/4/2021 - Present Yes        Adverse effect of tramadol ICD-10-CM: T40.425A  ICD-9-CM: E935.2  11/4/2021 - Present Yes        Polypharmacy ICD-10-CM: Z79.899  ICD-9-CM: V58.69  11/4/2021 - Present Yes        Recurrent depression ([address] (Chronic) ICD-10-CM: F33.9  ICD-9-CM: 296.30  1/10/2018 - Present Yes        HTN (hypertension) (Chronic) ICD-10-CM: I10  ICD-9-CM: 401.9  1/3/2013 - Present Yes    Overview Signed 1/8/2013  2:41 PM by Bailey Rod MD     The patient is stable on the current treatment. Tolerating well. No sides effects. Will not adjust at this time.              Narcolepsy (Chronic) ICD-10-CM: K24.992  ICD-9-CM: 347.00  1/3/2013 - Present Yes    Overview Signed 1/8/2013  2:40 PM by Bailey Rod MD     The patient is stable on the current treatment. Tolerating well. No sides effects. Will not adjust at this time. Objective:     Patient Vitals for the past 24 hrs:   Temp Pulse Resp BP SpO2   11/05/21 1124 97.8 °F (36.6 °C) 63 18 127/74 97 %   11/05/21 0751 97.9 °F (36.6 °C) 62 18 124/79 97 %   11/05/21 0514 97.7 °F (36.5 °C) 61 18 123/80 95 %   11/05/21 0045 97.5 °F (36.4 °C) 72 18 136/81 95 %   11/04/21 2326 97.2 °F (36.2 °C) 96 18 116/85 97 %   11/04/21 2232  62 17 118/79 95 %   11/04/21 2132  65 18 114/79    11/04/21 2131     96 %   11/04/21 1902  (!) 59 16 113/75 98 %     Oxygen Therapy  O2 Sat (%): 97 % (11/05/21 1124)  Pulse via Oximetry: 66 beats per minute (11/04/21 2232)  O2 Device: None (Room air) (11/05/21 0751)    Estimated body mass index is 22.5 kg/m² as calculated from the following:    Height as of this encounter: 5' 10\" (1.778 m). Weight as of this encounter: 71.1 kg (156 lb 12.8 oz). Intake/Output Summary (Last 24 hours) at 11/5/2021 1311  Last data filed at 11/5/2021 1158  Gross per 24 hour   Intake 250 ml   Output 875 ml   Net -625 ml         Physical Exam:     Blood pressure 127/74, pulse 63, temperature 97.8 °F (36.6 °C), resp. rate 18, height 5' 10\" (1.778 m), weight 71.1 kg (156 lb 12.8 oz), SpO2 97 %. General:    Well nourished. No overt distress  Head:  Normocephalic, atraumatic  Eyes:  Sclerae appear normal.  Pupils equally round. ENT:  Nares appear normal, no drainage. Moist oral mucosa  Neck:  No restricted ROM. Trachea midline   CV:   RRR. No m/r/g. No jugular venous distension. Lungs:   CTAB. No wheezing, rhonchi, or rales. Respirations even, unlabored  Abdomen: Bowel sounds present. Soft, nontender, nondistended. Extremities: No cyanosis or clubbing. No edema. +Phalen's test on L wrist, negative Tinel's  Skin:     No rashes and normal coloration. Warm and dry. Neuro:  CN II-XII grossly intact. Sensation intact.   A&Ox3  Psych:  Normal mood and affect. I have reviewed ordered lab tests and independently visualized imaging below:    Recent Labs:  Recent Results (from the past 48 hour(s))   CBC WITH AUTOMATED DIFF    Collection Time: 11/04/21  7:11 PM   Result Value Ref Range    WBC 5.3 4.3 - 11.1 K/uL    RBC 3.65 (L) 4.23 - 5.6 M/uL    HGB 10.5 (L) 13.6 - 17.2 g/dL    HCT 33.7 (L) 41.1 - 50.3 %    MCV 92.3 79.6 - 97.8 FL    MCH 28.8 26.1 - 32.9 PG    MCHC 31.2 (L) 31.4 - 35.0 g/dL    RDW 14.2 11.9 - 14.6 %    PLATELET 171 921 - 223 K/uL    MPV 10.1 9.4 - 12.3 FL    ABSOLUTE NRBC 0.00 0.0 - 0.2 K/uL    DF AUTOMATED      NEUTROPHILS 64 43 - 78 %    LYMPHOCYTES 20 13 - 44 %    MONOCYTES 14 (H) 4.0 - 12.0 %    EOSINOPHILS 2 0.5 - 7.8 %    BASOPHILS 1 0.0 - 2.0 %    IMMATURE GRANULOCYTES 0 0.0 - 5.0 %    ABS. NEUTROPHILS 3.4 1.7 - 8.2 K/UL    ABS. LYMPHOCYTES 1.1 0.5 - 4.6 K/UL    ABS. MONOCYTES 0.7 0.1 - 1.3 K/UL    ABS. EOSINOPHILS 0.1 0.0 - 0.8 K/UL    ABS. BASOPHILS 0.0 0.0 - 0.2 K/UL    ABS. IMM. GRANS. 0.0 0.0 - 0.5 K/UL   METABOLIC PANEL, COMPREHENSIVE    Collection Time: 11/04/21  7:11 PM   Result Value Ref Range    Sodium 140 138 - 145 mmol/L    Potassium 3.7 3.5 - 5.1 mmol/L    Chloride 110 (H) 98 - 107 mmol/L    CO2 26 21 - 32 mmol/L    Anion gap 4 (L) 7 - 16 mmol/L    Glucose 90 65 - 100 mg/dL    BUN 16 8 - 23 MG/DL    Creatinine 0.97 0.8 - 1.5 MG/DL    GFR est AA >60 >60 ml/min/1.73m2    GFR est non-AA >60 >60 ml/min/1.73m2    Calcium 8.2 (L) 8.3 - 10.4 MG/DL    Bilirubin, total 0.3 0.2 - 1.1 MG/DL    ALT (SGPT) 10 (L) 12 - 65 U/L    AST (SGOT) 8 (L) 15 - 37 U/L    Alk.  phosphatase 105 50 - 136 U/L    Protein, total 6.2 (L) 6.3 - 8.2 g/dL    Albumin 2.9 (L) 3.2 - 4.6 g/dL    Globulin 3.3 2.3 - 3.5 g/dL    A-G Ratio 0.9 (L) 1.2 - 3.5     TSH 3RD GENERATION    Collection Time: 11/04/21  7:11 PM   Result Value Ref Range    TSH 1.700 0.358 - 3.740 uIU/mL   LACTIC ACID    Collection Time: 11/04/21  7:11 PM   Result Value Ref Range    Lactic acid 1.8 0.4 - 2.0 MMOL/L   EKG, 12 LEAD, INITIAL    Collection Time: 11/04/21  7:20 PM   Result Value Ref Range    Ventricular Rate 58 BPM    Atrial Rate 58 BPM    P-R Interval 136 ms    QRS Duration 76 ms    Q-T Interval 458 ms    QTC Calculation (Bezet) 449 ms    Calculated P Axis 45 degrees    Calculated R Axis 31 degrees    Calculated T Axis 25 degrees    Diagnosis       Sinus bradycardia  Otherwise normal ECG  When compared with ECG of 01-APR-2019 14:06,  No significant change was found  Confirmed by Jonas Orosco MD, Catarino Manzano ([id number]) on 11/4/2021 8:31:19 PM     GLUCOSE, POC    Collection Time: 11/04/21  7:37 PM   Result Value Ref Range    Glucose (POC) 86 65 - 100 mg/dL    Performed by Jacob    URINE MICROSCOPIC    Collection Time: 11/04/21 10:16 PM   Result Value Ref Range    WBC 20-50 0 /hpf    RBC 5-10 0 /hpf    Epithelial cells 0 0 /hpf    Bacteria 3+ (H) 0 /hpf    Casts 3-5 0 /lpf   CULTURE, URINE    Collection Time: 11/04/21 10:16 PM    Specimen: Cath Urine   Result Value Ref Range    Special Requests: NO SPECIAL REQUESTS      Culture result:        NO GROWTH AFTER SHORT PERIOD OF INCUBATION. FURTHER RESULTS TO FOLLOW AFTER OVERNIGHT INCUBATION.    METABOLIC PANEL, BASIC    Collection Time: 11/05/21  4:41 AM   Result Value Ref Range    Sodium 143 136 - 145 mmol/L    Potassium 3.7 3.5 - 5.1 mmol/L    Chloride 111 (H) 98 - 107 mmol/L    CO2 28 21 - 32 mmol/L    Anion gap 4 (L) 7 - 16 mmol/L    Glucose 92 65 - 100 mg/dL    BUN 13 8 - 23 MG/DL    Creatinine 0.87 0.8 - 1.5 MG/DL    GFR est AA >60 >60 ml/min/1.73m2    GFR est non-AA >60 >60 ml/min/1.73m2    Calcium 8.9 8.3 - 10.4 MG/DL   CBC WITH AUTOMATED DIFF    Collection Time: 11/05/21  4:41 AM   Result Value Ref Range    WBC 4.2 (L) 4.3 - 11.1 K/uL    RBC 3.73 (L) 4.23 - 5.6 M/uL    HGB 10.7 (L) 13.6 - 17.2 g/dL    HCT 33.4 (L) 41.1 - 50.3 %    MCV 89.5 79.6 - 97.8 FL    MCH 28.7 26.1 - 32.9 PG    MCHC 32.0 31.4 - 35.0 g/dL    RDW 14.2 11.9 - 14.6 % PLATELET 818 (L) 588 - 450 K/uL    MPV 9.6 9.4 - 12.3 FL    ABSOLUTE NRBC 0.00 0.0 - 0.2 K/uL    DF AUTOMATED      NEUTROPHILS 53 43 - 78 %    LYMPHOCYTES 34 13 - 44 %    MONOCYTES 11 4.0 - 12.0 %    EOSINOPHILS 2 0.5 - 7.8 %    BASOPHILS 1 0.0 - 2.0 %    IMMATURE GRANULOCYTES 0 0.0 - 5.0 %    ABS. NEUTROPHILS 2.2 1.7 - 8.2 K/UL    ABS. LYMPHOCYTES 1.4 0.5 - 4.6 K/UL    ABS. MONOCYTES 0.4 0.1 - 1.3 K/UL    ABS. EOSINOPHILS 0.1 0.0 - 0.8 K/UL    ABS. BASOPHILS 0.0 0.0 - 0.2 K/UL    ABS. IMM. GRANS. 0.0 0.0 - 0.5 K/UL       All Micro Results     Procedure Component Value Units Date/Time    CULTURE, URINE [[id number]] Collected: 11/04/21 2216    Order Status: Completed Specimen: Cath Urine Updated: 11/05/21 1028     Special Requests: NO SPECIAL REQUESTS        Culture result:       NO GROWTH AFTER SHORT PERIOD OF INCUBATION. FURTHER RESULTS TO FOLLOW AFTER OVERNIGHT INCUBATION. Other Studies:  XR CHEST PORT    Result Date: 11/4/2021  PORTABLE CHEST, November 4, 2021 at 1919 hours CLINICAL HISTORY:  Decreased level of consciousness. COMPARISON:  October 18, 2020. FINDINGS:  AP semi-erect image demonstrates no confluent infiltrate or significant pleural fluid. The heart size is within normal limits without evidence of congestive heart failure or pneumothorax. The bony thorax appears intact on this view. Long-segment thoracolumbar instrumented fusion appears unchanged. There are overlying radiopaque support devices. NO ACUTE CARDIOPULMONARY DISEASE IDENTIFIED.       Current Meds:  Current Facility-Administered Medications   Medication Dose Route Frequency    [Held by provider] amLODIPine (NORVASC) tablet 5 mg  5 mg Oral DAILY    busPIRone (BUSPAR) tablet 15 mg  15 mg Oral DAILY    dextroamphetamine-amphetamine (ADDERALL) tablet 30 mg  30 mg Oral BID    [Held by provider] gabapentin (NEURONTIN) capsule 300 mg  300 mg Oral BID    metoprolol succinate (TOPROL-XL) XL tablet 50 mg  50 mg Oral DAILY  pantoprazole (PROTONIX) tablet 40 mg  40 mg Oral ACB    sertraline (ZOLOFT) tablet 100 mg  100 mg Oral BID    tamsulosin (FLOMAX) capsule 0.8 mg  0.8 mg Oral DAILY    sodium chloride (NS) flush 5-40 mL  5-40 mL IntraVENous Q8H    sodium chloride (NS) flush 5-40 mL  5-40 mL IntraVENous PRN    acetaminophen (TYLENOL) tablet 650 mg  650 mg Oral Q6H PRN    Or    acetaminophen (TYLENOL) suppository 650 mg  650 mg Rectal Q6H PRN    polyethylene glycol (MIRALAX) packet 17 g  17 g Oral DAILY PRN    bisacodyL (DULCOLAX) suppository 10 mg  10 mg Rectal DAILY PRN    ondansetron (ZOFRAN ODT) tablet 4 mg  4 mg Oral Q8H PRN    Or    ondansetron (ZOFRAN) injection 4 mg  4 mg IntraVENous Q6H PRN    famotidine (PEPCID) tablet 20 mg  20 mg Oral BID PRN    alum-mag hydroxide-simeth (MYLANTA) oral suspension 15 mL  15 mL Oral Q6H PRN    enoxaparin (LOVENOX) injection 40 mg  40 mg SubCUTAneous DAILY    ketorolac (TORADOL) tablet 10 mg  10 mg Oral Q6H PRN    cefTRIAXone (ROCEPHIN) 1 g in 0.9% sodium chloride (MBP/ADV) 50 mL MBP  1 g IntraVENous Q24H       Signed: Juana Ramos DO    Part of this note may have been written by using a voice dictation software. The note has been proof read but may still contain some grammatical/other typographical errors.

## 2021-11-05 NOTE — ED PROVIDER NOTES
61-year-old gentleman brought into the ER for unresponsiveness, he is wheelchair-bound and has been for some years. He was complaining of some upper back and shoulder pain and took one of his own Flexeril he had in his room. This was around 3 or 3:30 PM.    30 to 40 minutes later he asked his wife friend Ultram, and she gave him 1 Ultram tablet. Patient was seated in his wheelchair. He had rolled to his usual position watching the television. Around 5:00 wife thought he had just nodded off and was sleeping, she thinks he may have some degree of narcolepsy. She was unable to arouse him verbally, and then went over to shake him and was still unable to get him awake. She states he was very pale and was profusely sweating. EMS came and gave him some Narcan and some fluids, to which he responded well. Pt had had lunch, and even a snack prior to this episode    One prior similar episode 10/2020 , unresponsive, bradycardic, and hypotensive, fount to have uti, lactic acidosis, and slight troponin elevation at that time             Past Medical History:   Diagnosis Date    Allergic rhinitis, cause unspecified 3/8/2013    Backache 1/8/2013      The patient is stable on the current treatment. Tolerating well. No sides effects. Will not adjust at this time.  CAD (coronary artery disease) 01/03/2013    MI age 62- no intervention; denies CP/SOB. unable to ambulate due to back hx    Cancer [facility])     prostate    Cardiomegaly 3/14/2017    Cervical spinal stenosis 10/3/2016    Followed by Dr. Seth Reyes. Last Assessment & Plan:  MRI discussed with the patient, patient is following with Dr. Jerilyn Lubin for further assessment and consideration of surgery.     Chest pain     Chronic bilateral low back pain with right-sided sciatica 2/27/2019    Chronic pain     lumbar pain    Chronic right SI joint pain 2/27/2019    Closed T10 fracture ([address] 07/11/2019    Depression     Dizziness 3/8/2013    Dyslipidemia     Esophageal stenosis 8/16/2013    dxed on EGD early 2000's. Never dilated    Esophagitis 1/3/2013    GERD with esophagitis     not completely controlled with zantac    HTN (hypertension) 1/3/2013    controlled with med    Kidney stones 1/3/2013    Narcolepsy 1/3/2013    Prostate cancer ([facility] [address] 1/3/2013    Vertigo, benign positional        Past Surgical History:   Procedure Laterality Date    EGD  5/7/2021         HX BACK SURGERY  03/05/2020    T2-T5 POSTEROLATERAL FUSION W/ ALLOGRAFT LOCAL AUTOGRAFT, REVISION OF HARDWARE    HX CARPAL TUNNEL RELEASE Bilateral     Right (1995)   Left (2004)    HX CHOLECYSTECTOMY  2008?  HX LITHOTRIPSY      HX LUMBAR LAMINECTOMY      x6 Dr. Dayanna Harmon  2008?     Dr. Aman Rizvi         Family History:   Problem Relation Age of Onset    Depression Mother     Alcohol abuse Father     Depression Sister     Depression Brother     Depression Brother     Depression Sister     Depression Sister        Social History     Socioeconomic History    Marital status:      Spouse name: Not on file    Number of children: Not on file    Years of education: Not on file    Highest education level: Not on file   Occupational History    Not on file   Tobacco Use    Smoking status: Never Smoker    Smokeless tobacco: Never Used   Substance and Sexual Activity    Alcohol use: No    Drug use: No    Sexual activity: Not on file   Other Topics Concern     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    Blood Transfusions No    Caffeine Concern Yes     Comment: 3 pepsis a day    Occupational Exposure Not Asked    Hobby Hazards Not Asked    Sleep Concern Not Asked    Stress Concern Not Asked    Weight Concern Not Asked    Special Diet No    Back Care Not Asked    Exercise Yes     Comment: yard work, does some in the bed for neck    Bike Helmet Not Asked    Seat Belt Yes    Self-Exams Not Asked   Social History Narrative    Not on file Social Determinants of Health     Financial Resource Strain:     Difficulty of Paying Living Expenses: Not on file   Food Insecurity:     Worried About Running Out of Food in the Last Year: Not on file    Carli of Food in the Last Year: Not on file   Transportation Needs:     Lack of Transportation (Medical): Not on file    Lack of Transportation (Non-Medical): Not on file   Physical Activity:     Days of Exercise per Week: Not on file    Minutes of Exercise per Session: Not on file   Stress:     Feeling of Stress : Not on file   Social Connections:     Frequency of Communication with Friends and Family: Not on file    Frequency of Social Gatherings with Friends and Family: Not on file    Attends [employer] Services: Not on file    Active Member of [address] [place] [address] [employer] or Organizations: Not on file    Attends Club or Organization Meetings: Not on file    Marital Status: Not on file   Intimate Partner Violence:     Fear of Current or Ex-Partner: Not on file    Emotionally Abused: Not on file    Physically Abused: Not on file    Sexually Abused: Not on file   Housing Stability:     Unable to Pay for Housing in the Last Year: Not on file    Number of [place] in the Last Year: Not on file    Unstable Housing in the Last Year: Not on file         ALLERGIES: Patient has no known allergies. Review of Systems   Constitutional: Negative for chills and fever. HENT: Negative for rhinorrhea and sore throat. Eyes: Negative for discharge and redness. Respiratory: Negative for cough and shortness of breath. Cardiovascular: Negative for chest pain and palpitations. Gastrointestinal: Negative for abdominal pain, diarrhea, nausea and vomiting. Musculoskeletal: Positive for arthralgias, back pain and joint swelling. Skin: Negative for rash. Neurological: Negative for dizziness and headaches. Psychiatric/Behavioral: Positive for decreased concentration.    All other systems reviewed and are negative. Vitals:    11/04/21 1902   BP: 113/75   Pulse: (!) 59   Resp: 16   SpO2: 98%   Weight: 71.7 kg (158 lb)   Height: 5' 10\" (1.778 m)            Physical Exam  Vitals and nursing note reviewed. Constitutional:       General: He is not in acute distress. Appearance: Normal appearance. He is well-developed. He is not ill-appearing, toxic-appearing or diaphoretic. HENT:      Head: Normocephalic and atraumatic. Right Ear: External ear normal.      Left Ear: External ear normal.      Mouth/Throat:      Mouth: Mucous membranes are moist.      Pharynx: Oropharynx is clear. No oropharyngeal exudate or posterior oropharyngeal erythema. Eyes:      General: No scleral icterus. Right eye: No discharge. Left eye: No discharge. Extraocular Movements: Extraocular movements intact. Conjunctiva/sclera: Conjunctivae normal.      Pupils: Pupils are equal, round, and reactive to light. Neck:      Thyroid: No thyromegaly. Trachea: Trachea normal.   Cardiovascular:      Rate and Rhythm: Normal rate and regular rhythm. Heart sounds: Normal heart sounds. No murmur heard. No gallop. Pulmonary:      Effort: Pulmonary effort is normal. No respiratory distress. Breath sounds: Normal breath sounds. No wheezing or rales. Abdominal:      Palpations: Abdomen is soft. There is no hepatomegaly, splenomegaly or pulsatile mass. Tenderness: There is no abdominal tenderness. There is no guarding. Musculoskeletal:         General: Normal range of motion. Cervical back: Normal range of motion and neck supple. Normal range of motion. Comments: Bilateral AFOs   Lymphadenopathy:      Cervical: No cervical adenopathy. Skin:     General: Skin is warm and dry. Neurological:      General: No focal deficit present. Mental Status: He is alert and oriented to person, place, and time. Mental status is at baseline. GCS: GCS eye subscore is 4.  GCS verbal subscore is 5. GCS motor subscore is 6. Cranial Nerves: Cranial nerve deficit present. No dysarthria. Sensory: No sensory deficit. Motor: No abnormal muscle tone. Comments: cni 2-12 grossly   Psychiatric:         Mood and Affect: Mood normal.         Behavior: Behavior normal.          MDM  Number of Diagnoses or Management Options  Hypotension due to hypovolemia: new and requires workup  Transient alteration of awareness: new and requires workup  Diagnosis management comments: Medical decision making note:  Patient unresponsive with hypertensive spell at home blood pressure in the 60s, responded well to fluids. No obvious source, EKG troponin and lactate normal,  Admit to hospitalist service for observation  This concludes the \"medical decision making note\" part of this emergency department visit note.          Amount and/or Complexity of Data Reviewed  Clinical lab tests: reviewed and ordered (Results Include:    Recent Results (from the past 24 hour(s))  -CBC WITH AUTOMATED DIFF:   Collection Time: 11/04/21  7:11 PM       Result                      Value             Ref Range           WBC                         5.3               4.3 - 11.1 K*       RBC                         3.65 (L)          4.23 - 5.6 M*       HGB                         10.5 (L)          13.6 - 17.2 *       HCT                         33.7 (L)          41.1 - 50.3 %       MCV                         92.3              79.6 - 97.8 *       MCH                         28.8              26.1 - 32.9 *       MCHC                        31.2 (L)          31.4 - 35.0 *       RDW                         14.2              11.9 - 14.6 %       PLATELET                    150               150 - 450 K/*       MPV                         10.1              9.4 - 12.3 FL       ABSOLUTE NRBC               0.00              0.0 - 0.2 K/*       DF                          AUTOMATED                             NEUTROPHILS                 64 43 - 78 %           LYMPHOCYTES                 20                13 - 44 %           MONOCYTES                   14 (H)            4.0 - 12.0 %        EOSINOPHILS                 2                 0.5 - 7.8 %         BASOPHILS                   1                 0.0 - 2.0 %         IMMATURE GRANULOCYTES       0                 0.0 - 5.0 %         ABS. NEUTROPHILS            3.4               1.7 - 8.2 K/*       ABS. LYMPHOCYTES            1.1               0.5 - 4.6 K/*       ABS. MONOCYTES              0.7               0.1 - 1.3 K/*       ABS. EOSINOPHILS            0.1               0.0 - 0.8 K/*       ABS. BASOPHILS              0.0               0.0 - 0.2 K/*       ABS. IMM. GRANS.            0.0               0.0 - 0.5 K/*  -METABOLIC PANEL, COMPREHENSIVE:   Collection Time: 11/04/21  7:11 PM       Result                      Value             Ref Range           Sodium                      140               138 - 145 mm*       Potassium                   3.7               3.5 - 5.1 mm*       Chloride                    110 (H)           98 - 107 mmo*       CO2                         26                21 - 32 mmol*       Anion gap                   4 (L)             7 - 16 mmol/L       Glucose                     90                65 - 100 mg/*       BUN                         16                8 - 23 MG/DL        Creatinine                  0.97              0.8 - 1.5 MG*       GFR est AA                  >60               >60 ml/min/1*       GFR est non-AA              >60               >60 ml/min/1*       Calcium                     8.2 (L)           8.3 - 10.4 M*       Bilirubin, total            0.3               0.2 - 1.1 MG*       ALT (SGPT)                  10 (L)            12 - 65 U/L         AST (SGOT)                  8 (L)             15 - 37 U/L         Alk.  phosphatase            105               50 - 136 U/L        Protein, total              6.2 (L)           6.3 - 8.2 g/* Albumin                     2.9 (L)           3.2 - 4.6 g/*       Globulin                    3.3               2.3 - 3.5 g/*       A-G Ratio                   0.9 (L)           1.2 - 3.5      -TSH 3RD GENERATION:   Collection Time: 11/04/21  7:11 PM       Result                      Value             Ref Range           TSH                         1.700             0.358 - 3.74*  -LACTIC ACID:   Collection Time: 11/04/21  7:11 PM       Result                      Value             Ref Range           Lactic acid                 1.8               0.4 - 2.0 MM*  -EKG, 12 LEAD, INITIAL:   Collection Time: 11/04/21  7:20 PM       Result                      Value             Ref Range           Ventricular Rate            58                BPM                 Atrial Rate                 58                BPM                 P-R Interval                136               ms                  QRS Duration                76                ms                  Q-T Interval                458               ms                  QTC Calculation (Bezet)     449               ms                  Calculated P Axis           45                degrees             Calculated R Axis           31                degrees             Calculated T Axis           25                degrees             Diagnosis                                                     Sinus bradycardia Otherwise normal ECG When compared with ECG of 01-APR-2019 14:06, No significant change was found Confirmed by Emanuel Hong MD, Susie López ([id number]) on 11/4/2021 8:31:19 PM   -GLUCOSE, POC:   Collection Time: 11/04/21  7:37 PM       Result                      Value             Ref Range           Glucose (POC)               86                65 - 100 mg/*       Performed by                                                  Jacob  )  Tests in the radiology section of CPT®: reviewed and ordered (XR CHEST PORT   Final Result    NO ACUTE CARDIOPULMONARY DISEASE IDENTIFIED.     )  Decide to obtain previous medical records or to obtain history from someone other than the patient: yes  Obtain history from someone other than the patient: (Wife via telephone)  Discuss the patient with other providers: yes (Hospitalist)    Risk of Complications, Morbidity, and/or Mortality  Presenting problems: moderate  Diagnostic procedures: low  Management options: low    Patient Progress  Patient progress: improved         Procedures

## 2021-11-05 NOTE — H&P
Hospitalist History and Physical   Admit Date:  2021  6:59 PM   Name:  Janeen Almeida   Age:  77 y.o. Sex:  male  :  1945   MRN:  [id number]   Room:  ER08/    Presenting Complaint: Altered mental status    Reason(s) for Admission: Episode of unresponsiveness [R41.89]     History of Present Illness:   Janeen Almeida is a 77 y.o. male with medical history of narcolepsy, HTN, depression, wheelchair bound, who presented with episode of unresponsiveness. Pt was sitting at home watching TV around 5pm when he apparently fell asleep. Wife thinks he was \"nodding off\". He had one flexeril and tramadol around 330 prior to this event. Wife tried to wake him up but was unable to and pt appeared pale and diaphoretic. He was bradycardic and hypotensive per EMS eval.   However he had very rapid response to narcan and returned to baseline status. He is not diabetic and was not hypoglycemic per notes. He is back to baseline in ER and has no complaints currently. No CP, SOB, palpitations. Does not misuse his medications. He says he does take his adderall for narcolepsy    Review of Systems:  10 systems reviewed and negative except as noted in HPI. Assessment & Plan:       Episode of unresponsiveness   -observation overnight  -interesting story. Response to narcan suggests etiology narcotic but don't think this adequately explains bradycardia  -cardiac monitor. May wish to send to cardiology for 30d event monitor. This is done as outpatient; pt shows up at office to get fitted. -home in AM if no complications.    -Should avoid narcotics  -asked nursing to update med rec to ensure accuracy      HTN   -hold home meds for now given episode of hypotension      Narcolepsy   -cont home adderall      Recurrent depression   -cont home sertraline      Neurogenic bladder  -cont flomax      Dispo/Discharge Planning:   -at baseline functional status.   Home at discharge    Diet: ADULT DIET Regular  VTE ppx: lovenox  Code status: Full Code    Hospital Problems as of 11/4/2021 Date Reviewed: 5/7/2021          Codes Class Noted - Resolved POA    * (Principal) Episode of unresponsiveness ICD-10-CM: R41.89  ICD-9-CM: 780.09  11/4/2021 - Present Yes        Adverse effect of tramadol ICD-10-CM: T40.425A  ICD-9-CM: E935.2  11/4/2021 - Present Yes        Polypharmacy ICD-10-CM: Z79.899  ICD-9-CM: V58.69  11/4/2021 - Present Yes        Recurrent depression ([facility] [address] (Chronic) ICD-10-CM: F33.9  ICD-9-CM: 296.30  1/10/2018 - Present Yes        HTN (hypertension) (Chronic) ICD-10-CM: I10  ICD-9-CM: 401.9  1/3/2013 - Present Yes    Overview Signed 1/8/2013  2:41 PM by Maicol Pike MD     The patient is stable on the current treatment. Tolerating well. No sides effects. Will not adjust at this time. Narcolepsy (Chronic) ICD-10-CM: F76.120  ICD-9-CM: 347.00  1/3/2013 - Present Yes    Overview Signed 1/8/2013  2:40 PM by Maicol Pike MD     The patient is stable on the current treatment. Tolerating well. No sides effects. Will not adjust at this time. Past History:  Past Medical History:   Diagnosis Date    Allergic rhinitis, cause unspecified 3/8/2013    Backache 1/8/2013      The patient is stable on the current treatment. Tolerating well. No sides effects. Will not adjust at this time.  CAD (coronary artery disease) 01/03/2013    MI age 62- no intervention; denies CP/SOB. unable to ambulate due to back hx    Cancer [facility])     prostate    Cardiomegaly 3/14/2017    Cervical spinal stenosis 10/3/2016    Followed by Dr. Hailee Javier. Last Assessment & Plan:  MRI discussed with the patient, patient is following with Dr. Justus Beaver for further assessment and consideration of surgery.     Chest pain     Chronic bilateral low back pain with right-sided sciatica 2/27/2019    Chronic pain     lumbar pain    Chronic right SI joint pain 2/27/2019    Closed T10 fracture ([facility] [address] 07/11/2019    Depression  Dizziness 3/8/2013    Dyslipidemia     Esophageal stenosis 8/16/2013    dxed on EGD early 2000's. Never dilated    Esophagitis 1/3/2013    GERD with esophagitis     not completely controlled with zantac    HTN (hypertension) 1/3/2013    controlled with med    Kidney stones 1/3/2013    Narcolepsy 1/3/2013    Prostate cancer ([facility] [address] 1/3/2013    Vertigo, benign positional      Past Surgical History:   Procedure Laterality Date    EGD  5/7/2021         HX BACK SURGERY  03/05/2020    T2-T5 POSTEROLATERAL FUSION W/ ALLOGRAFT LOCAL AUTOGRAFT, REVISION OF HARDWARE    HX CARPAL TUNNEL RELEASE Bilateral     Right (1995)   Left (2004)    HX CHOLECYSTECTOMY  2008?  HX LITHOTRIPSY      HX LUMBAR LAMINECTOMY      x6 Dr. Guevara Ospina  2008? Dr. Lucy Bowling   Allergen Reactions    Tramadol Other (comments)     unresponsive      Social History     Tobacco Use    Smoking status: Never Smoker    Smokeless tobacco: Never Used   Substance Use Topics    Alcohol use: No      Family History   Problem Relation Age of Onset    Depression Mother     Alcohol abuse Father     Depression Sister     Depression Brother     Depression Brother     Depression Sister     Depression Sister       Family history reviewed and negative except as otherwise noted.     Immunization History   Administered Date(s) Administered    Influenza High Dose Vaccine PF 09/26/2016    Influenza Vaccine 10/02/2013, 09/23/2015, 09/13/2018    Influenza Vaccine (Quad) PF (>6 Mo Flulaval, Fluarix, and >3 Yrs Afluria, Fluzone 44589) 11/15/2017, 10/19/2020    Influenza Vaccine (Tri) Adjuvanted (>65 Yrs FLUAD TRI 32681) 09/13/2018    Pneumococcal Conjugate (PCV-13) 03/30/2016, 09/26/2016    Pneumococcal Vaccine (Unspecified Type) 10/02/2013    TB Skin Test (PPD) Intradermal 10/06/2016    Td 09/13/2013    Td, Adsorbed PF 09/13/2013     Prior to Admit Medications:  Current Outpatient Medications   Medication Instructions    amLODIPine (NORVASC) 5 mg, Oral, DAILY    busPIRone (BUSPAR) 15 mg, Oral, DAILY    celecoxib (CELEBREX) 200 mg, Oral, DAILY    dextroamphetamine-amphetamine (ADDERALL) 30 mg tablet 1 po TID for June 2019    gabapentin (NEURONTIN) 300 mg, Oral, 3 TIMES DAILY, Pt normally only take twice daily     HYDROcodone-acetaminophen (NORCO) 7.5-325 mg per tablet 1 Tablet, Oral, EVERY 8 HOURS AS NEEDED, prn severe pain for June 2019    loratadine (CLARITIN) 10 mg, Oral, DAILY    metoprolol succinate (TOPROL-XL) 50 mg XL tablet TAKE ONE TABLET BY MOUTH EVERY DAY    mirabegron ER (MYRBETRIQ) 25 mg, Oral, DAILY    naloxone (NARCAN) 0.4 mg/mL injection 1 injection at first sign of over dose, may repeat every 2-3 min. Call 911.  nitroglycerin (NITROSTAT) 0.4 mg, SubLINGual, EVERY 5 MIN AS NEEDED, X3. If unrelieved call 911    omeprazole (PRILOSEC) 40 mg, Oral, DAILY    ondansetron hcl (ZOFRAN) 4 mg, Oral, EVERY 8 HOURS AS NEEDED    OTHER Knee High compression hose or socks  (20 - 30 mmHg if possible) Wear Daily Dx:   ( vericose veins, edema - R60.9)    OTHER Disabled placard - This is to certify that this patient has an inability to ordinarily walk 100 feet nonstop without aggravating an existing medical condition, including the increase of pain.  OTHER Disabled placard - This is to certify that this patient has an inability to ordinarily walk 100 feet nonstop without aggravating an existing medical condition, including the increase of pain.     OTHER Ramp - needed for entrance to home  Dx:  Frequent falls R29.6, gait instability  R26.81    potassium chloride SR (KLOR-CON 10) 10 mEq tablet Take 2 p.o. daily as needed    promethazine (PHENERGAN) 25 mg, Oral, EVERY 6 HOURS AS NEEDED    raNITIdine (ZANTAC) 300 mg tab TAKE ONE TABLET BY MOUTH EVERY DAY    sertraline (ZOLOFT) 200 mg, Oral, DAILY, depression dx:F32.9    tamsulosin (FLOMAX) 0.8 mg, Oral, DAILY       Objective:     Patient Vitals for the past 24 hrs:   Pulse Resp BP SpO2   11/04/21 2132 65 18 114/79    11/04/21 2131    96 %   11/04/21 1902 (!) 59 16 113/75 98 %     Oxygen Therapy  O2 Sat (%): 96 % (11/04/21 2131)  O2 Device: None (Room air) (11/04/21 1902)    Estimated body mass index is 22.67 kg/m² as calculated from the following:    Height as of this encounter: 5' 10\" (1.778 m). Weight as of this encounter: 71.7 kg (158 lb). No intake or output data in the 24 hours ending 11/04/21 2235      Physical Exam:  Blood pressure 114/79, pulse 65, resp. rate 18, height 5' 10\" (1.778 m), weight 71.7 kg (158 lb), SpO2 96 %. General:    Well nourished. No overt distress  Head:  Normocephalic, atraumatic  Eyes:  Sclerae appear normal.  Pupils equally round. ENT:  Nares appear normal, no drainage. Moist oral mucosa  Neck:  No restricted ROM. Trachea midline   CV:   RRR. No m/r/g. No jugular venous distension. Lungs:   CTAB. No wheezing, rhonchi, or rales. Respirations even, unlabored  Abdomen: Bowel sounds present. Soft, nontender, nondistended. Extremities: No cyanosis or clubbing. No edema  Skin:     No rashes and normal coloration. Warm and dry. Neuro:  CN II-XII grossly intact. Sensation intact. A&Ox3  Psych:  Normal mood and affect.       I have reviewed ordered lab tests and independently visualized imaging below:    Last 24hr Labs:  Recent Results (from the past 24 hour(s))   CBC WITH AUTOMATED DIFF    Collection Time: 11/04/21  7:11 PM   Result Value Ref Range    WBC 5.3 4.3 - 11.1 K/uL    RBC 3.65 (L) 4.23 - 5.6 M/uL    HGB 10.5 (L) 13.6 - 17.2 g/dL    HCT 33.7 (L) 41.1 - 50.3 %    MCV 92.3 79.6 - 97.8 FL    MCH 28.8 26.1 - 32.9 PG    MCHC 31.2 (L) 31.4 - 35.0 g/dL    RDW 14.2 11.9 - 14.6 %    PLATELET 793 205 - 955 K/uL    MPV 10.1 9.4 - 12.3 FL    ABSOLUTE NRBC 0.00 0.0 - 0.2 K/uL    DF AUTOMATED      NEUTROPHILS 64 43 - 78 %    LYMPHOCYTES 20 13 - 44 %    MONOCYTES 14 (H) 4.0 - 12.0 %    EOSINOPHILS 2 0.5 - 7.8 %    BASOPHILS 1 0.0 - 2.0 %    IMMATURE GRANULOCYTES 0 0.0 - 5.0 %    ABS. NEUTROPHILS 3.4 1.7 - 8.2 K/UL    ABS. LYMPHOCYTES 1.1 0.5 - 4.6 K/UL    ABS. MONOCYTES 0.7 0.1 - 1.3 K/UL    ABS. EOSINOPHILS 0.1 0.0 - 0.8 K/UL    ABS. BASOPHILS 0.0 0.0 - 0.2 K/UL    ABS. IMM. GRANS. 0.0 0.0 - 0.5 K/UL   METABOLIC PANEL, COMPREHENSIVE    Collection Time: 11/04/21  7:11 PM   Result Value Ref Range    Sodium 140 138 - 145 mmol/L    Potassium 3.7 3.5 - 5.1 mmol/L    Chloride 110 (H) 98 - 107 mmol/L    CO2 26 21 - 32 mmol/L    Anion gap 4 (L) 7 - 16 mmol/L    Glucose 90 65 - 100 mg/dL    BUN 16 8 - 23 MG/DL    Creatinine 0.97 0.8 - 1.5 MG/DL    GFR est AA >60 >60 ml/min/1.73m2    GFR est non-AA >60 >60 ml/min/1.73m2    Calcium 8.2 (L) 8.3 - 10.4 MG/DL    Bilirubin, total 0.3 0.2 - 1.1 MG/DL    ALT (SGPT) 10 (L) 12 - 65 U/L    AST (SGOT) 8 (L) 15 - 37 U/L    Alk.  phosphatase 105 50 - 136 U/L    Protein, total 6.2 (L) 6.3 - 8.2 g/dL    Albumin 2.9 (L) 3.2 - 4.6 g/dL    Globulin 3.3 2.3 - 3.5 g/dL    A-G Ratio 0.9 (L) 1.2 - 3.5     TSH 3RD GENERATION    Collection Time: 11/04/21  7:11 PM   Result Value Ref Range    TSH 1.700 0.358 - 3.740 uIU/mL   LACTIC ACID    Collection Time: 11/04/21  7:11 PM   Result Value Ref Range    Lactic acid 1.8 0.4 - 2.0 MMOL/L   EKG, 12 LEAD, INITIAL    Collection Time: 11/04/21  7:20 PM   Result Value Ref Range    Ventricular Rate 58 BPM    Atrial Rate 58 BPM    P-R Interval 136 ms    QRS Duration 76 ms    Q-T Interval 458 ms    QTC Calculation (Bezet) 449 ms    Calculated P Axis 45 degrees    Calculated R Axis 31 degrees    Calculated T Axis 25 degrees    Diagnosis       Sinus bradycardia  Otherwise normal ECG  When compared with ECG of 01-APR-2019 14:06,  No significant change was found  Confirmed by Maricarmen Rogers MD, Analilia Chaney ([id number]) on 11/4/2021 8:31:19 PM     GLUCOSE, POC    Collection Time: 11/04/21  7:37 PM   Result Value Ref Range    Glucose (POC) 86 65 - 100 mg/dL    Performed by Syed Leigh All Micro Results     Procedure Component Value Units Date/Time    CULTURE, URINE [[id number]] Collected: 11/04/21 2216    Order Status: Completed Specimen: Cath Urine Updated: 11/04/21 2235          Other Studies:  XR CHEST PORT    Result Date: 11/4/2021  PORTABLE CHEST, November 4, 2021 at [address] hours CLINICAL HISTORY:  Decreased level of consciousness. COMPARISON:  October 18, 2020. FINDINGS:  AP semi-erect image demonstrates no confluent infiltrate or significant pleural fluid. The heart size is within normal limits without evidence of congestive heart failure or pneumothorax. The bony thorax appears intact on this view. Long-segment thoracolumbar instrumented fusion appears unchanged. There are overlying radiopaque support devices. NO ACUTE CARDIOPULMONARY DISEASE IDENTIFIED. Signed:  Alejandro Ingram MD    Part of this note may have been written by using a voice dictation software. The note has been proof read but may still contain some grammatical/other typographical errors.

## 2021-11-05 NOTE — ROUTINE PROCESS
Bedside and Verbal shift change report given to [facility], RN and Migel Gray RN (oncoming nurse) by self Chacha masterson). Report included the following information SBAR, Kardex, Intake/Output, MAR and Recent Results.

## 2021-11-06 VITALS
HEIGHT: 70 IN | OXYGEN SATURATION: 94 % | BODY MASS INDEX: 22.71 KG/M2 | DIASTOLIC BLOOD PRESSURE: 89 MMHG | SYSTOLIC BLOOD PRESSURE: 143 MMHG | WEIGHT: 158.6 LBS | HEART RATE: 64 BPM | RESPIRATION RATE: 18 BRPM | TEMPERATURE: 97 F

## 2021-11-06 LAB
ANION GAP SERPL CALC-SCNC: 4 MMOL/L (ref 7–16)
BASOPHILS # BLD: 0 K/UL (ref 0–0.2)
BASOPHILS NFR BLD: 1 % (ref 0–2)
BUN SERPL-MCNC: 18 MG/DL (ref 8–23)
CALCIUM SERPL-MCNC: 9 MG/DL (ref 8.3–10.4)
CHLORIDE SERPL-SCNC: 109 MMOL/L (ref 98–107)
CO2 SERPL-SCNC: 28 MMOL/L (ref 21–32)
CREAT SERPL-MCNC: 1.1 MG/DL (ref 0.8–1.5)
DIFFERENTIAL METHOD BLD: ABNORMAL
EOSINOPHIL # BLD: 0.1 K/UL (ref 0–0.8)
EOSINOPHIL NFR BLD: 2 % (ref 0.5–7.8)
ERYTHROCYTE [DISTWIDTH] IN BLOOD BY AUTOMATED COUNT: 14.1 % (ref 11.9–14.6)
GLUCOSE SERPL-MCNC: 86 MG/DL (ref 65–100)
HCT VFR BLD AUTO: 34.8 % (ref 41.1–50.3)
HGB BLD-MCNC: 11.2 G/DL (ref 13.6–17.2)
IMM GRANULOCYTES # BLD AUTO: 0 K/UL (ref 0–0.5)
IMM GRANULOCYTES NFR BLD AUTO: 0 % (ref 0–5)
LYMPHOCYTES # BLD: 1.5 K/UL (ref 0.5–4.6)
LYMPHOCYTES NFR BLD: 45 % (ref 13–44)
MCH RBC QN AUTO: 29.2 PG (ref 26.1–32.9)
MCHC RBC AUTO-ENTMCNC: 32.2 G/DL (ref 31.4–35)
MCV RBC AUTO: 90.6 FL (ref 79.6–97.8)
MONOCYTES # BLD: 0.3 K/UL (ref 0.1–1.3)
MONOCYTES NFR BLD: 10 % (ref 4–12)
NEUTS SEG # BLD: 1.4 K/UL (ref 1.7–8.2)
NEUTS SEG NFR BLD: 41 % (ref 43–78)
NRBC # BLD: 0 K/UL (ref 0–0.2)
PLATELET # BLD AUTO: 156 K/UL (ref 150–450)
PMV BLD AUTO: 10.1 FL (ref 9.4–12.3)
POTASSIUM SERPL-SCNC: 3.7 MMOL/L (ref 3.5–5.1)
RBC # BLD AUTO: 3.84 M/UL (ref 4.23–5.6)
SODIUM SERPL-SCNC: 141 MMOL/L (ref 136–145)
WBC # BLD AUTO: 3.4 K/UL (ref 4.3–11.1)

## 2021-11-06 PROCEDURE — 85025 COMPLETE CBC W/AUTO DIFF WBC: CPT

## 2021-11-06 PROCEDURE — 96372 THER/PROPH/DIAG INJ SC/IM: CPT

## 2021-11-06 PROCEDURE — 74011000258 HC RX REV CODE- 258: Performed by: FAMILY MEDICINE

## 2021-11-06 PROCEDURE — 74011250636 HC RX REV CODE- 250/636: Performed by: INTERNAL MEDICINE

## 2021-11-06 PROCEDURE — 96376 TX/PRO/DX INJ SAME DRUG ADON: CPT

## 2021-11-06 PROCEDURE — 36415 COLL VENOUS BLD VENIPUNCTURE: CPT

## 2021-11-06 PROCEDURE — G0378 HOSPITAL OBSERVATION PER HR: HCPCS

## 2021-11-06 PROCEDURE — 74011250637 HC RX REV CODE- 250/637: Performed by: INTERNAL MEDICINE

## 2021-11-06 PROCEDURE — 74011250636 HC RX REV CODE- 250/636: Performed by: FAMILY MEDICINE

## 2021-11-06 PROCEDURE — 80048 BASIC METABOLIC PNL TOTAL CA: CPT

## 2021-11-06 RX ORDER — CEPHALEXIN 500 MG/1
500 CAPSULE ORAL 4 TIMES DAILY
Qty: 12 CAPSULE | Refills: 0 | Status: SHIPPED | OUTPATIENT
Start: 2021-11-06 | End: 2021-11-09

## 2021-11-06 RX ADMIN — ACETAMINOPHEN 650 MG: 325 TABLET ORAL at 04:54

## 2021-11-06 RX ADMIN — CEFTRIAXONE 1 G: 1 INJECTION, POWDER, FOR SOLUTION INTRAMUSCULAR; INTRAVENOUS at 00:52

## 2021-11-06 RX ADMIN — ENOXAPARIN SODIUM 40 MG: 100 INJECTION SUBCUTANEOUS at 08:52

## 2021-11-06 RX ADMIN — GABAPENTIN 300 MG: 300 CAPSULE ORAL at 08:51

## 2021-11-06 RX ADMIN — Medication 10 ML: at 05:17

## 2021-11-06 RX ADMIN — PANTOPRAZOLE SODIUM 40 MG: 40 TABLET, DELAYED RELEASE ORAL at 05:17

## 2021-11-06 RX ADMIN — BUSPIRONE HYDROCHLORIDE 15 MG: 10 TABLET ORAL at 08:51

## 2021-11-06 RX ADMIN — SERTRALINE 100 MG: 100 TABLET, FILM COATED ORAL at 08:51

## 2021-11-06 RX ADMIN — DEXTROAMPHETAMINE SACCHARATE, AMPHETAMINE ASPARTATE, DEXTROAMPHETAMINE SULFATE, AMPHETAMINE SULFATE TABLETS, 10 MG,CLL 30 MG: 2.5; 2.5; 2.5; 2.5 TABLET ORAL at 08:52

## 2021-11-06 RX ADMIN — TAMSULOSIN HYDROCHLORIDE 0.8 MG: 0.4 CAPSULE ORAL at 08:51

## 2021-11-06 NOTE — DISCHARGE INSTRUCTIONS
Patient Education        Home Blood Pressure Test: About This Test  What is it? A home blood pressure test allows you to keep track of your blood pressure at home. Blood pressure is a measure of the force of blood against the walls of your arteries. Blood pressure readings include two numbers, such as 130/80 (say \"130 over 80\"). The first number is the systolic pressure. The second number is the diastolic pressure. Why is this test done? You may do this test at home to:  · Find out if you have high blood pressure. · Track your blood pressure if you have high blood pressure. · Track how well medicine is working to reduce high blood pressure. · Check how lifestyle changes, such as weight loss and exercise, are affecting blood pressure. How do you prepare for the test?  For at least 30 minutes before you take your blood pressure, don't exercise, drink caffeine, or smoke. Empty your bladder before the test. Sit quietly with your back straight and both feet on the floor for at least 5 minutes. This helps you take your blood pressure while you feel comfortable and relaxed. How is the test done? · If your doctor recommends it, take your blood pressure twice a day. Take it in the morning and evening. · Sit with your arm slightly bent and resting on a table so that your upper arm is at the same level as your heart. · Use the same arm each time you take your blood pressure. · Place the blood pressure cuff on the bare skin of your upper arm. You may have to roll up your sleeve, remove your arm from the sleeve, or take your shirt off. · Wrap the blood pressure cuff around your upper arm so that the lower edge of the cuff is about 1 inch above the bend of your elbow. · Do not move, talk, or text while you take your blood pressure. Follow the instructions that came with your blood pressure monitor. They might be different from the following. · Press the on/off button on the automatic monitor.  Then you may need to wait until the screen says the monitor is ready. · Press the start button. The cuff will inflate and deflate by itself. · Your blood pressure numbers will appear on the screen. · Wait one minute and take your blood pressure again. · If your monitor does not automatically save your numbers, write them in your log book, along with the date and time. Follow-up care is a key part of your treatment and safety. Be sure to make and go to all appointments, and call your doctor if you are having problems. It's also a good idea to keep a list of the medicines you take. Where can you learn more? Go to http://www.maldonado.com/  Enter C427 in the search box to learn more about \"Home Blood Pressure Test: About This Test.\"  Current as of: April 29, 2021               Content Version: 13.0  © 1037-3240 [employer]. Care instructions adapted under license by [employer] (which disclaims liability or warranty for this information). If you have questions about a medical condition or this instruction, always ask your healthcare professional. [place] [address] any warranty or liability for your use of this information. Patient Education   Cephalexin (By mouth)   Cephalexin (bpc-b-SWJ-in)  Treats infections. This medicine is a cephalosporin antibiotic. Brand Name(s): Daxbia, Keflex   There may be other brand names for this medicine. When This Medicine Should Not Be Used: This medicine is not right for everyone. Do not use this medicine if you had an allergic reaction to cephalexin or another cephalosporin medicine. How to Use This Medicine:   Capsule, Tablet, Tablet for Suspension, Liquid  · Your doctor will tell you how much medicine to use. Do not use more than directed. · Read and follow the patient instructions that come with this medicine. Talk to your doctor or pharmacist if you have any questions.   · You may take your medicine with food or milk to avoid stomach upset. · Oral liquid: Shake well just before use. Measure the oral liquid medicine with a marked measuring spoon, oral syringe, or medicine cup. · Take all of the medicine in your prescription to clear up your infection, even if you feel better after the first few doses. · Missed dose: Take a dose as soon as you remember. If it is almost time for your next dose, wait until then and take a regular dose. Do not take extra medicine to make up for a missed dose. · Capsule or tablet: Store at room temperature away from heat, moisture, and direct light. · Oral liquid: Store in the refrigerator for 14 days. After 14 days, throw away any unused medicine. Do not freeze. Drugs and Foods to Avoid:   Ask your doctor or pharmacist before using any other medicine, including over-the-counter medicines, vitamins, and herbal products. · Some medicines and foods can affect how cephalexin works. Tell your doctor if you are also using  ¨ Metformin  ¨ Probenecid  Warnings While Using This Medicine:   · Tell your doctor if you are pregnant or breastfeeding, or if you have kidney disease, liver disease, or a history of digestive problems, such as colitis. Tell your doctor if you are allergic to penicillin. · This medicine can cause diarrhea. Call your doctor if the diarrhea becomes severe, does not stop, or is bloody. Do not take any medicine to stop diarrhea until you have talked to your doctor. Diarrhea can occur 2 months or more after you stop taking this medicine. · Tell any doctor or dentist who treats you that you are using this medicine. This medicine may affect certain medical test results. · Call your doctor if your symptoms do not improve or if they get worse. · Keep all medicine out of the reach of children. Never share your medicine with anyone.   Possible Side Effects While Using This Medicine:   Call your doctor right away if you notice any of these side effects:  · Allergic reaction: Itching or hives, swelling in your face or hands, swelling or tingling in your mouth or throat, chest tightness, trouble breathing  · Blistering, peeling, red skin rash  · Severe diarrhea, especially if bloody or ongoing  · Severe stomach pain, vomiting  If you notice these less serious side effects, talk with your doctor:   · Mild diarrhea or nausea  If you notice other side effects that you think are caused by this medicine, tell your doctor. Call your doctor for medical advice about side effects. You may report side effects to FDA at 9-392-DVM-1582  © 2017 [facility] Information is for End User's use only and may not be sold, redistributed or otherwise used for commercial purposes. The above information is an  only. It is not intended as medical advice for individual conditions or treatments. Talk to your doctor, nurse or pharmacist before following any medical regimen to see if it is safe and effective for you. Patient Education        Back Pain: Care Instructions  Your Care Instructions     Back pain has many possible causes. It is often related to problems with muscles and ligaments of the back. It may also be related to problems with the nerves, discs, or bones of the back. Moving, lifting, standing, sitting, or sleeping in an awkward way can strain the back. Sometimes you don't notice the injury until later. Arthritis is another common cause of back pain. Although it may hurt a lot, back pain usually improves on its own within several weeks. Most people recover in 12 weeks or less. Using good home treatment and being careful not to stress your back can help you feel better sooner. Follow-up care is a key part of your treatment and safety. Be sure to make and go to all appointments, and call your doctor if you are having problems. It's also a good idea to know your test results and keep a list of the medicines you take. How can you care for yourself at home?   · Sit or lie in positions that are most comfortable and reduce your pain. Try one of these positions when you lie down:  ? Lie on your back with your knees bent and supported by large pillows. ? Lie on the floor with your legs on the seat of a sofa or chair. ? Lie on your side with your knees and hips bent and a pillow between your legs. ? Lie on your stomach if it does not make pain worse. · Do not sit up in bed, and avoid soft couches and twisted positions. Bed rest can help relieve pain at first, but it delays healing. Avoid bed rest after the first day of back pain. · Change positions every 30 minutes. If you must sit for long periods of time, take breaks from sitting. Get up and walk around, or lie in a comfortable position. · Try using a heating pad on a low or medium setting for 15 to 20 minutes every 2 or 3 hours. Try a warm shower in place of one session with the heating pad. · You can also try an ice pack for 10 to 15 minutes every 2 to 3 hours. Put a thin cloth between the ice pack and your skin. · Take pain medicines exactly as directed. ? If the doctor gave you a prescription medicine for pain, take it as prescribed. ? If you are not taking a prescription pain medicine, ask your doctor if you can take an over-the-counter medicine. · Take short walks several times a day. You can start with 5 to 10 minutes, 3 or 4 times a day, and work up to longer walks. Walk on level surfaces and avoid hills and stairs until your back is better. · Return to work and other activities as soon as you can. Continued rest without activity is usually not good for your back. · To prevent future back pain, do exercises to stretch and strengthen your back and stomach. Learn how to use good posture, safe lifting techniques, and proper body mechanics. When should you call for help?    Call your doctor now or seek immediate medical care if:    · You have new or worsening numbness in your legs.     · You have new or worsening weakness in your legs. (This could make it hard to stand up.)     · You lose control of your bladder or bowels. Watch closely for changes in your health, and be sure to contact your doctor if:    · You have a fever, lose weight, or don't feel well.     · You do not get better as expected. Where can you learn more? Go to http://www.maldonado.com/  Enter I594 in the search box to learn more about \"Back Pain: Care Instructions. \"  Current as of: July 1, 2021               Content Version: 13.0  © 8726-3252 [employer]. Care instructions adapted under license by [employer] (which disclaims liability or warranty for this information). If you have questions about a medical condition or this instruction, always ask your healthcare professional. [address] any warranty or liability for your use of this information.

## 2021-11-06 NOTE — DISCHARGE SUMMARY
Hospitalist Discharge Summary   Admit Date:  2021  6:59 PM   DC Note date: 2021  Name:  Frances Alexander   Age:  77 y.o. Sex:  male  :  1945   MRN:  [id number]   Room:  [facility]  PCP:  Betty Mcgill MD    Presenting Complaint: Altered mental status    Initial Admission Diagnosis: Episode of unresponsiveness [R41.89]     Problem List for this Hospitalization:  Hospital Problems as of 2021 Date Reviewed: 2021          Codes Class Noted - Resolved POA    Left wrist pain ICD-10-CM: M25.532  ICD-9-CM: 719.43  2021 - Present Unknown        Severe protein-calorie malnutrition ([facility] [address] ICD-10-CM: E43  ICD-9-CM: 272  2021 - Present Yes        * (Principal) Syncope ICD-10-CM: R55  ICD-9-CM: 780.2  2021 - Present Yes        Adverse effect of tramadol ICD-10-CM: T40.425A  ICD-9-CM: E935.2  2021 - Present Yes        Polypharmacy ICD-10-CM: Z79.899  ICD-9-CM: V58.69  2021 - Present Yes        Recurrent depression ([facility] [address] (Chronic) ICD-10-CM: F33.9  ICD-9-CM: 296.30  1/10/2018 - Present Yes        HTN (hypertension) (Chronic) ICD-10-CM: I10  ICD-9-CM: 401.9  1/3/2013 - Present Yes    Overview Signed 2013  2:41 PM by Betty Mcgill MD     The patient is stable on the current treatment. Tolerating well. No sides effects. Will not adjust at this time. Narcolepsy (Chronic) ICD-10-CM: C46.386  ICD-9-CM: 347.00  1/3/2013 - Present Yes    Overview Signed 2013  2:40 PM by Betty Mcgill MD     The patient is stable on the current treatment. Tolerating well. No sides effects. Will not adjust at this time. Did Patient have Sepsis (YES OR NO): No    Hospital Course:  77 y. o. male with medical history of narcolepsy, HTN, depression, wheelchair bound, who presented with episode of unresponsiveness.  Pt was sitting at home watching TV around 5pm when he apparently fell asleep.  Wife thinks he was \"nodding off\". [facility] stated he had chronic shoulder pain and had worsening L wrist pain 11/4, took one flexeril and tramadol around 3:30 prior to this event.  Wife tried to wake him up but was unable to and pt appeared pale and diaphoretic.  He was bradycardic and hypotensive per EMS eval. However he had very rapid response to narcan and returned to baseline status. He was back to baseline in ER. He says he does take his adderall for narcolepsy. In ED, UA+ bacteriuria but otherwise, unremarkable CXR. CBC/CMP at baseline.      Per chart review, he was admitted for similar episode a year ago and responded to IVF. Pt was admitted for syncope most likely due to reflex syncope, med induced with him taking Tramadol, Flexeril as well as BP meds at home causing hypotension and bradycardia. Telemetry monitoring here was uneventful. Pt remains asymptomatic and feels well here. Discussed with him extensively to stop all narcotics. Can continue metoprolol but hold off on Norvasc until he follows up with his PCP in 3-5 days for BP recheck. Cardiology has been notified and pt will need to follow up with them on 11/8/21 for a 30d Holter monitor. He was also treated with Rocephin for bacteriuria and will be discharged on Keflex to finish a total of 5 day of abx. Urine culture was no growth. Orthopedic team was consulted for pt's L wrist pain and shoulder/neck pain that appears to be a chronic problem. Ortho suspect that this is chronic neck pain with radiculopathy symptoms and he needs to follow up with his Neurosurgeon Dr. Janice Espinal in 1-2 weeks after discharge. He was also noted to have chronic R hand contracture and referral was sent to [address] [place] [address] hand specialist for this to be seen in 1-2 weeks as well. He was to resume his home gabapentin and again, no narcotics for pain. Pt expressed understanding for plan of care. Discussed with wife as well. He feels well and wants to go home. He was discharged in hemodynamically stable condition.  F/u with PCP in 3-5 days for BP recheck and hospital f/u, f/u with Orthopedic surgery in 1-2 weeks and Neurosurgeon in 1-2 weeks. Return precautions given. Disposition: Home or Self Care  Diet: ADULT DIET Regular  ADULT ORAL NUTRITION SUPPLEMENT Breakfast, Lunch, Dinner; Standard High Calorie/High Protein  ADULT DIET Regular  Code Status: Full Code    Follow Up Orders: Follow-up Appointments   Procedures    FOLLOW UP VISIT Appointment in: Other (Specify) Outpatient referral to Dr. Luz Cheung or Dr. Cinthya Herbert for chronic right hand contracture. Call  [phone] for appointment     Outpatient referral to Dr. Luz Cheung or Dr. Cinthya Herbert for chronic right hand contracture. Call  [phone] for appointment     Standing Status:   Standing     Number of Occurrences:   1     Order Specific Question:   Appointment in     Answer: Other (Specify)    FOLLOW UP VISIT Appointment in: One Week Outpatient follow up with Dr. Faustino Burton at [place] [address] follow up with Dr. Faustino Burton at [facility]     Standing Status:   Standing     Number of Occurrences:   1     Order Specific Question:   Appointment in     Answer: One Week    FOLLOW UP VISIT Appointment in: One Week Follow up with PCP in 3-5 days     Follow up with PCP in 3-5 days     Standing Status:   Standing     Number of Occurrences:   1     Standing Expiration Date:   11/7/2021     Order Specific Question:   Appointment in     Answer: One Week    FOLLOW UP VISIT Appointment in: [facility] Cardiology on Monday 11/8 for 30d Holter monitor     [facility] Cardiology on Monday 11/8 for 30d Holter monitor     Standing Status:   Standing     Number of Occurrences:   1     Standing Expiration Date:   11/7/2021     Order Specific Question:   Appointment in     Answer:    Other (Specify)       Follow-up Information     Follow up With Specialties Details Why Contact [facility] CARDIOLOGY  On 11/8/2021 Monday 11/8 at 11:30 AM [place] office [address] Thalia Moulton [address] [facility] Florinda Kehr   Dr. Jonathan Cerda on Tuesday 12/14 at 10:30 AM [place] office [address] [facility] [id number]  Lilliam Johnson, [address]   [address] [place] [address] Jayden 57  [phone]            Follow up labs/diagnostics (ultimately defer to outpatient provider):  BP check and hospital f/u with PCP  F/u with Neurosurgeon Dr. Marguerite Barkley for chronic neck pain and radiculopathy symptoms  F/u with Orthopedic Hand specialist or R hand contracture  F/u with [facility] Cardiology in 2-3 days for 30d Holter monitor    Time spent in patient discharge and coordination 45 minutes. Plan was discussed with pt and wife. All questions answered. Patient was stable at time of discharge. Instructions given to call a physician or return if any concerns. Discharge Info:   Current Discharge Medication List      START taking these medications    Details   cephALEXin (KEFLEX) 500 mg capsule Take 1 Capsule by mouth four (4) times daily for 3 days. Qty: 12 Capsule, Refills: 0  Start date: 11/6/2021, End date: 11/9/2021         CONTINUE these medications which have NOT CHANGED    Details   gabapentin (NEURONTIN) 300 mg capsule Take 300 mg by mouth three (3) times daily. Pt normally only take twice daily      busPIRone (BUSPAR) 15 mg tablet Take 15 mg by mouth daily. sertraline (ZOLOFT) 100 mg tablet Take 2 Tabs by mouth daily. depression dx:F32.9  Qty: 180 Tab, Refills: 1    Associated Diagnoses: Depression, unspecified depression type      dextroamphetamine-amphetamine (ADDERALL) 30 mg tablet 1 po TID for June 2019  Indications: Recurring Sleep Episodes During the Day  Qty: 90 Tab, Refills: 0    Associated Diagnoses: Narcolepsy due to underlying condition without cataplexy      omeprazole (PRILOSEC) 40 mg capsule Take 1 Cap by mouth daily.   Qty: 90 Cap, Refills: 5    Associated Diagnoses: Esophageal stenosis      metoprolol succinate (TOPROL-XL) 50 mg XL tablet TAKE ONE TABLET BY MOUTH EVERY DAY  Qty: 90 Tab, Refills: 3    Associated Diagnoses: Essential hypertension with goal blood pressure less than 140/90      nitroglycerin (NITROSTAT) 0.4 mg SL tablet 1 Tab by SubLINGual route every five (5) minutes as needed for Chest Pain. X3. If unrelieved call 911  Qty: 3 Bottle, Refills: 1    Associated Diagnoses: Other chest pain      raNITIdine (ZANTAC) 300 mg tab TAKE ONE TABLET BY MOUTH EVERY DAY  Qty: 90 Tab, Refills: 3    Associated Diagnoses: Esophagitis      !! OTHER Disabled placard - This is to certify that this patient has an inability to ordinarily walk 100 feet nonstop without aggravating an existing medical condition, including the increase of pain. Qty: 1 Each, Refills: 11    Associated Diagnoses: Frequent falls; Gait instability      !! OTHER Ramp - needed for entrance to home  Dx:  Frequent falls R29.6, gait instability  R26.81  Qty: 1 Each, Refills: 1    Associated Diagnoses: Frequent falls; Gait instability      !! OTHER Disabled placard - This is to certify that this patient has an inability to ordinarily walk 100 feet nonstop without aggravating an existing medical condition, including the increase of pain. Qty: 1 Each, Refills: 11    Associated Diagnoses: Urinary incontinence, unspecified type      !! OTHER Knee High compression hose or socks  (20 - 30 mmHg if possible) Wear Daily Dx:   ( vericose veins, edema - R60.9)  Qty: 2 Each, Refills: 5    Associated Diagnoses: Ankle edema       !! - Potential duplicate medications found. Please discuss with provider.       STOP taking these medications       amLODIPine (Norvasc) 5 mg tablet Comments:   Reason for Stopping:         tamsulosin (FLOMAX) 0.4 mg capsule Comments:   Reason for Stopping:         traMADoL (ULTRAM) 50 mg tablet Comments:   Reason for Stopping:         HYDROcodone-acetaminophen (NORCO) 7.5-325 mg per tablet Comments:   Reason for Stopping:         cyclobenzaprine (FLEXERIL) 10 mg tablet Comments:   Reason for Stopping:         baclofen (LIORESAL) 10 mg tablet Comments:   Reason for Stopping:               Procedures done this admission:  * No surgery found *    Consults this admission:  IP CONSULT TO CARDIOLOGY  IP CONSULT TO ORTHOPEDIC SURGERY    Echocardiogram/EKG results:  No results found for this or any previous visit. EKG Results     Procedure 720 Value Units Date/Time    EKG, 12 LEAD, INITIAL [[id number]] Collected: 11/04/21 1920    Order Status: Completed Updated: 11/04/21 2031     Ventricular Rate 58 BPM      Atrial Rate 58 BPM      P-R Interval 136 ms      QRS Duration 76 ms      Q-T Interval 458 ms      QTC Calculation (Bezet) 449 ms      Calculated P Axis 45 degrees      Calculated R Axis 31 degrees      Calculated T Axis 25 degrees      Diagnosis --     Sinus bradycardia  Otherwise normal ECG  When compared with ECG of 01-APR-2019 14:06,  No significant change was found  Confirmed by Luciana Tran MD, [facility] ([id number]) on 11/4/2021 8:31:19 PM            Diagnostic Imaging/Tests:   XR CHEST PORT    Result Date: 11/4/2021  [facility] CLINICAL HISTORY:  Decreased level of consciousness. COMPARISON:  October 18, 2020. FINDINGS:  AP semi-erect image demonstrates no confluent infiltrate or significant pleural fluid. The heart size is within normal limits without evidence of congestive heart failure or pneumothorax. The bony thorax appears intact on this view. Long-segment thoracolumbar instrumented fusion appears unchanged. There are overlying radiopaque support devices. NO ACUTE CARDIOPULMONARY DISEASE IDENTIFIED. All Micro Results     Procedure Component Value Units Date/Time    CULTURE, URINE [[id number]] Collected: 11/04/21 2216    Order Status: Completed Specimen: Cath Urine Updated: 11/05/21 1028     Special Requests: NO SPECIAL REQUESTS        Culture result:       NO GROWTH AFTER SHORT PERIOD OF INCUBATION. FURTHER RESULTS TO FOLLOW AFTER OVERNIGHT INCUBATION.                 Labs: Results:       BMP, Mg, Phos Recent Labs     11/06/21 0348 11/05/21 0441 11/04/21 1911    143 140   K 3.7 3.7 3.7   * 111* 110*   CO2 28 28 26   AGAP 4* 4* 4*   BUN 18 13 16   CREA 1.10 0.87 0.97   CA 9.0 8.9 8.2*   GLU 86 92 90      CBC Recent Labs     11/06/21 0348 11/05/21 0441 11/04/21 1911   WBC 3.4* 4.2* 5.3   RBC 3.84* 3.73* 3.65*   HGB 11.2* 10.7* 10.5*   HCT 34.8* 33.4* 33.7*    145* 150   GRANS 41* 53 64   LYMPH 45* 34 20   EOS 2 2 2   MONOS 10 11 14*   BASOS 1 1 1   IG 0 0 0   ANEU 1.4* 2.2 3.4   ABL 1.5 1.4 1.1   ANGELES 0.1 0.1 0.1   ABM 0.3 0.4 0.7   ABB 0.0 0.0 0.0   AIG 0.0 0.0 0.0      LFT Recent Labs     11/04/21 1911   ALT 10*      TP 6.2*   ALB 2.9*   GLOB 3.3   AGRAT 0.9*      Cardiac Testing Lab Results   Component Value Date/Time    BNP 18 05/26/2017 03:30 AM    BNP 9 03/13/2015 01:35 AM    Troponin-I, Qt. 0.06 (H) 10/03/2016 03:58 PM    Troponin-I, Qt. 0.05 10/19/2014 04:20 PM      Coagulation Tests No results found for: PTP, INR, APTT, INREXT, INREXT   A1c Lab Results   Component Value Date/Time    Hemoglobin A1c 5.6 10/19/2018 03:50 PM    Hemoglobin A1c CANCELED 10/12/2018 03:05 PM      Lipid Panel Lab Results   Component Value Date/Time    Cholesterol, total 120 10/19/2018 03:50 PM    HDL Cholesterol 29 (L) 10/19/2018 03:50 PM    LDL, calculated 56 10/19/2018 03:50 PM    VLDL, calculated 35 10/19/2018 03:50 PM    Triglyceride 174 (H) 10/19/2018 03:50 PM      Thyroid Panel Lab Results   Component Value Date/Time    TSH 1.700 11/04/2021 07:11 PM    TSH 1.610 10/19/2018 03:50 PM        Most Recent UA Lab Results   Component Value Date/Time    Color HUSSEIN 10/18/2020 03:27 AM    Appearance TURBID 10/18/2020 03:27 AM    Specific gravity 1.023 10/18/2020 03:27 AM    pH (UA) 5.5 10/18/2020 03:27 AM    Protein 100 (A) 10/18/2020 03:27 AM    Glucose Negative 10/18/2020 03:27 AM    Ketone TRACE (A) 10/18/2020 03:27 AM    Bilirubin SMALL (A) 10/18/2020 03:27 AM    Blood LARGE (A) 10/18/2020 03:27 AM    Urobilinogen 1.0 10/18/2020 03:27 AM    Nitrites Negative 10/18/2020 03:27 AM    Leukocyte Esterase MODERATE (A) 10/18/2020 03:27 AM    WBC 20-50 11/04/2021 10:16 PM    RBC 5-10 11/04/2021 10:16 PM    Epithelial cells 0 11/04/2021 10:16 PM    Bacteria 3+ (H) 11/04/2021 10:16 PM    Casts 3-5 11/04/2021 10:16 PM    Crystals, urine OCCASIONAL 10/18/2020 03:27 AM    Mucus 4+ (H) 10/18/2020 03:27 AM          All Labs from Last 24 Hrs:  Recent Results (from the past 24 hour(s))   METABOLIC PANEL, BASIC    Collection Time: 11/06/21  3:48 AM   Result Value Ref Range    Sodium 141 136 - 145 mmol/L    Potassium 3.7 3.5 - 5.1 mmol/L    Chloride 109 (H) 98 - 107 mmol/L    CO2 28 21 - 32 mmol/L    Anion gap 4 (L) 7 - 16 mmol/L    Glucose 86 65 - 100 mg/dL    BUN 18 8 - 23 MG/DL    Creatinine 1.10 0.8 - 1.5 MG/DL    GFR est AA >60 >60 ml/min/1.73m2    GFR est non-AA >60 >60 ml/min/1.73m2    Calcium 9.0 8.3 - 10.4 MG/DL   CBC WITH AUTOMATED DIFF    Collection Time: 11/06/21  3:48 AM   Result Value Ref Range    WBC 3.4 (L) 4.3 - 11.1 K/uL    RBC 3.84 (L) 4.23 - 5.6 M/uL    HGB 11.2 (L) 13.6 - 17.2 g/dL    HCT 34.8 (L) 41.1 - 50.3 %    MCV 90.6 79.6 - 97.8 FL    MCH 29.2 26.1 - 32.9 PG    MCHC 32.2 31.4 - 35.0 g/dL    RDW 14.1 11.9 - 14.6 %    PLATELET 237 853 - 122 K/uL    MPV 10.1 9.4 - 12.3 FL    ABSOLUTE NRBC 0.00 0.0 - 0.2 K/uL    DF AUTOMATED      NEUTROPHILS 41 (L) 43 - 78 %    LYMPHOCYTES 45 (H) 13 - 44 %    MONOCYTES 10 4.0 - 12.0 %    EOSINOPHILS 2 0.5 - 7.8 %    BASOPHILS 1 0.0 - 2.0 %    IMMATURE GRANULOCYTES 0 0.0 - 5.0 %    ABS. NEUTROPHILS 1.4 (L) 1.7 - 8.2 K/UL    ABS. LYMPHOCYTES 1.5 0.5 - 4.6 K/UL    ABS. MONOCYTES 0.3 0.1 - 1.3 K/UL    ABS. EOSINOPHILS 0.1 0.0 - 0.8 K/UL    ABS. BASOPHILS 0.0 0.0 - 0.2 K/UL    ABS. IMM.  GRANS. 0.0 0.0 - 0.5 K/UL       Current Med List in Hospital:   Current Facility-Administered Medications   Medication Dose Route Frequency    busPIRone (BUSPAR) tablet 15 mg  15 mg Oral DAILY    dextroamphetamine-amphetamine (ADDERALL) tablet 30 mg  30 mg Oral BID    gabapentin (NEURONTIN) capsule 300 mg  300 mg Oral BID    pantoprazole (PROTONIX) tablet 40 mg  40 mg Oral ACB    sertraline (ZOLOFT) tablet 100 mg  100 mg Oral BID    tamsulosin (FLOMAX) capsule 0.8 mg  0.8 mg Oral DAILY    sodium chloride (NS) flush 5-40 mL  5-40 mL IntraVENous Q8H    sodium chloride (NS) flush 5-40 mL  5-40 mL IntraVENous PRN    acetaminophen (TYLENOL) tablet 650 mg  650 mg Oral Q6H PRN    Or    acetaminophen (TYLENOL) suppository 650 mg  650 mg Rectal Q6H PRN    polyethylene glycol (MIRALAX) packet 17 g  17 g Oral DAILY PRN    bisacodyL (DULCOLAX) suppository 10 mg  10 mg Rectal DAILY PRN    ondansetron (ZOFRAN ODT) tablet 4 mg  4 mg Oral Q8H PRN    Or    ondansetron (ZOFRAN) injection 4 mg  4 mg IntraVENous Q6H PRN    famotidine (PEPCID) tablet 20 mg  20 mg Oral BID PRN    alum-mag hydroxide-simeth (MYLANTA) oral suspension 15 mL  15 mL Oral Q6H PRN    enoxaparin (LOVENOX) injection 40 mg  40 mg SubCUTAneous DAILY    ketorolac (TORADOL) tablet 10 mg  10 mg Oral Q6H PRN    cefTRIAXone (ROCEPHIN) 1 g in 0.9% sodium chloride (MBP/ADV) 50 mL MBP  1 g IntraVENous Q24H       Allergies   Allergen Reactions    Tramadol Other (comments)     unresponsive     Immunization History   Administered Date(s) Administered    Influenza High Dose Vaccine PF 09/26/2016    Influenza Vaccine 10/02/2013, 09/23/2015, 09/13/2018    Influenza Vaccine (Quad) PF (>6 Mo Flulaval, Fluarix, and >3 Yrs Afluria, Fluzone [id number]) 11/15/2017, 10/19/2020    Influenza Vaccine (Tri) Adjuvanted (>65 Yrs FLUAD TRI [id number]) 09/13/2018    Pneumococcal Conjugate (PCV-13) 03/30/2016, 09/26/2016    Pneumococcal Vaccine (Unspecified Type) 10/02/2013    TB Skin Test (PPD) Intradermal 10/06/2016    Td 09/13/2013    Td, Adsorbed PF 09/13/2013       Recent Vital Data:  Patient Vitals for the past 24 hrs:   Temp Pulse Resp BP SpO2   11/06/21 0804 97 °F (36.1 °C) 67 20 129/83 97 %   11/06/21 0543 97.4 °F (36.3 °C) (!) 59 20 138/81 97 %   11/06/21 0044 97.7 °F (36.5 °C) 61 18 114/81 96 %   11/05/21 2102 97.4 °F (36.3 °C) 61 22 139/83 99 %   11/05/21 1642 97.6 °F (36.4 °C) 71 18 114/81 97 %   11/05/21 1124 97.8 °F (36.6 °C) 63 18 127/74 97 %     Oxygen Therapy  O2 Sat (%): 97 % (11/06/21 0804)  Pulse via Oximetry: 66 beats per minute (11/04/21 2232)  O2 Device: None (Room air) (11/06/21 0804)    Estimated body mass index is 22.76 kg/m² as calculated from the following:    Height as of this encounter: 5' 10\" (1.778 m). Weight as of this encounter: 71.9 kg (158 lb 9.6 oz). Intake/Output Summary (Last 24 hours) at 11/6/2021 1055  Last data filed at 11/6/2021 2188  Gross per 24 hour   Intake 350 ml   Output 2075 ml   Net -1725 ml         Physical Exam:    General:          Well nourished. No overt distress  Head:               Normocephalic, atraumatic  Eyes:               Sclerae appear normal.  Pupils equally round. ENT:                Nares appear normal, no drainage. Moist oral mucosa  Neck:               No restricted ROM. Trachea midline   CV:                  RRR. No m/r/g. No jugular venous distension. Lungs:             CTAB. No wheezing, rhonchi, or rales. Respirations even, unlabored  Abdomen: Bowel sounds present. Soft, nontender, nondistended. Extremities:     No cyanosis or clubbing. No edema. +Phalen's test on L wrist, negative Tinel's. R hand contracture  Skin:                No rashes and normal coloration. Warm and dry. Neuro:             CN II-XII grossly intact. Sensation intact. A&Ox3  Psych:             Normal mood and affect. Signed: Eulalia Orr DO    Part of this note may have been written by using a voice dictation software.   The note has been proof read but may still contain some grammatical/other typographical errors.

## 2021-11-06 NOTE — PROGRESS NOTES
Pt is for discharge home today via [facility] around 115pm. and no needs/supportive care orders recieved for CM at this time. Care Management Interventions  PCP Verified by CM:  Yes (Sayra Kaur)  Last Visit to PCP: 08/25/21  Discharge Durable Medical Equipment: No  Physical Therapy Consult: No  Occupational Therapy Consult: No  Support Systems: Spouse/Significant Other  Discharge Location  Discharge Placement: Home

## 2021-11-06 NOTE — PROGRESS NOTES
Problem: Falls - Risk of  Goal: *Absence of Falls  Description: Document Candice Ledbetter Fall Risk and appropriate interventions in the flowsheet.   Outcome: Progressing Towards Goal  Note: Fall Risk Interventions:  Mobility Interventions: Bed/chair exit alarm, Communicate number of staff needed for ambulation/transfer, Patient to call before getting OOB         Medication Interventions: Teach patient to arise slowly, Patient to call before getting OOB, Evaluate medications/consider consulting pharmacy         History of Falls Interventions: Bed/chair exit alarm, Investigate reason for fall, Room close to nurse's station

## 2021-11-09 LAB
BACTERIA SPEC CULT: ABNORMAL
BACTERIA SPEC CULT: ABNORMAL
SERVICE CMNT-IMP: ABNORMAL

## 2021-12-13 PROBLEM — I48.0 PAF (PAROXYSMAL ATRIAL FIBRILLATION) (HCC): Status: ACTIVE | Noted: 2021-12-13

## 2021-12-13 PROBLEM — R29.818 SUSPECTED SLEEP APNEA: Status: ACTIVE | Noted: 2021-12-13

## 2021-12-13 PROBLEM — M51.36 DDD (DEGENERATIVE DISC DISEASE), LUMBAR: Status: ACTIVE | Noted: 2019-06-18

## 2022-03-18 PROBLEM — Z01.810 PREOP CARDIOVASCULAR EXAM: Status: ACTIVE | Noted: 2017-04-29

## 2022-03-18 PROBLEM — R55 SYNCOPE: Status: ACTIVE | Noted: 2021-11-04

## 2022-03-18 PROBLEM — E43 SEVERE PROTEIN-CALORIE MALNUTRITION (HCC): Status: ACTIVE | Noted: 2021-11-05

## 2022-03-18 PROBLEM — N39.0 UTI (URINARY TRACT INFECTION): Status: ACTIVE | Noted: 2020-10-18

## 2022-03-18 PROBLEM — I95.9 HYPOTENSION: Status: ACTIVE | Noted: 2020-10-18

## 2022-03-18 PROBLEM — M25.532 LEFT WRIST PAIN: Status: ACTIVE | Noted: 2021-11-05

## 2022-03-18 PROBLEM — F33.9 RECURRENT DEPRESSION (HCC): Status: ACTIVE | Noted: 2018-01-10

## 2022-03-18 PROBLEM — R13.14 PHARYNGOESOPHAGEAL DYSPHAGIA: Status: ACTIVE | Noted: 2018-10-12

## 2022-03-19 PROBLEM — T40.425A: Status: ACTIVE | Noted: 2021-11-04

## 2022-03-19 PROBLEM — E87.20 LACTIC ACIDOSIS: Status: ACTIVE | Noted: 2020-10-18

## 2022-03-19 PROBLEM — Z79.899 POLYPHARMACY: Status: ACTIVE | Noted: 2021-11-04

## 2022-03-19 PROBLEM — I48.0 PAF (PAROXYSMAL ATRIAL FIBRILLATION) (HCC): Status: ACTIVE | Noted: 2021-12-13

## 2022-03-19 PROBLEM — R29.818 SUSPECTED SLEEP APNEA: Status: ACTIVE | Noted: 2021-12-13

## 2022-03-20 PROBLEM — M51.36 DDD (DEGENERATIVE DISC DISEASE), LUMBAR: Status: ACTIVE | Noted: 2019-06-18

## 2022-07-08 ENCOUNTER — TELEPHONE (OUTPATIENT)
Dept: CARDIOLOGY CLINIC | Age: 77
End: 2022-07-08

## 2022-07-08 ENCOUNTER — PREP FOR PROCEDURE (OUTPATIENT)
Dept: ADMINISTRATIVE | Age: 77
End: 2022-07-08

## 2022-07-08 RX ORDER — SODIUM CHLORIDE 0.9 % (FLUSH) 0.9 %
5-40 SYRINGE (ML) INJECTION PRN
OUTPATIENT
Start: 2022-07-08

## 2022-07-08 RX ORDER — SODIUM CHLORIDE 9 MG/ML
INJECTION, SOLUTION INTRAVENOUS PRN
OUTPATIENT
Start: 2022-07-08

## 2022-07-08 RX ORDER — SODIUM CHLORIDE 0.9 % (FLUSH) 0.9 %
5-40 SYRINGE (ML) INJECTION EVERY 12 HOURS SCHEDULED
OUTPATIENT
Start: 2022-07-08

## 2022-07-12 ENCOUNTER — OFFICE VISIT (OUTPATIENT)
Dept: ORTHOPEDIC SURGERY | Age: 77
End: 2022-07-12
Payer: COMMERCIAL

## 2022-07-12 DIAGNOSIS — G95.9 CERVICAL MYELOPATHY (HCC): Primary | ICD-10-CM

## 2022-07-12 PROCEDURE — 99214 OFFICE O/P EST MOD 30 MIN: CPT | Performed by: ORTHOPAEDIC SURGERY

## 2022-07-12 PROCEDURE — 1123F ACP DISCUSS/DSCN MKR DOCD: CPT | Performed by: ORTHOPAEDIC SURGERY

## 2022-07-12 NOTE — PROGRESS NOTES
[facility]opaedic Hand Clinic Note    Name: Geovanni Pedroza  YOB: 1945  Gender: male  MRN: [id number]      Follow up visit:   1. Cervical myelopathy (HCC)        HPI: Geovanni Pedroza is a 77 y.o. male who is following up for right hand contracture. He is here to review his nerve conduction study. He has not done any hand therapy as I had recommended. He has not seen Dr. Andria Rushing, and he has not brought any of his old records for me to review. ROS/Meds/PSH/PMH/FH/SH: I personally reviewed the patients standard intake form. Pertinents are discussed in the HPI    Physical Examination:    Musculoskeletal Examination:  Examination on the Right upper extremity demonstrates cap refill < 5 seconds in all fingers, skin is intact, he has profound atrophy of the flexor and extensor forearm musculature as well as thenar,  hypothenar and interosseous muscles. He has obvious fasciculations of the forearm flexor and extensor musculature. He has well-healed surgical incisions consistent with prior carpal and cubital tunnel release. He has a negative Tinel over the cubital  tunnel, negative elbow flexion test.  He has a negative Tinel at the carpal tunnel, negative carpal tunnel compression and Phalen's test.  He has an intrinsic minus posture of the hand, and flexion contractures of the PIP joints of the index middle and  ring fingers, however the index and middle fingers of the most significant, and are not passively correctable past about 10 degrees. he is able to flex all fingers and make a composite fist to the distal palmar crease    Imaging / Electrodiagnostic Tests:     I independently reviewed and interpreted the patient's nerve conduction study. He is absent bilateral ulnar sensory conduction response. Bilateral median sensory conductions demonstrate prolonged latency and low amplitude. Right radial sensory conduction demonstrates low amplitude and normal latency.   Left radial sensory conduction

## 2022-07-28 ENCOUNTER — HOSPITAL ENCOUNTER (EMERGENCY)
Dept: CT IMAGING | Age: 77
Discharge: HOME | End: 2022-07-31
Payer: MEDICARE

## 2022-07-28 ENCOUNTER — HOSPITAL ENCOUNTER (EMERGENCY)
Dept: GENERAL RADIOLOGY | Age: 77
Discharge: HOME | End: 2022-07-31
Payer: MEDICARE

## 2022-07-28 ENCOUNTER — HOSPITAL ENCOUNTER (EMERGENCY)
Age: 77
Discharge: HOME | End: 2022-07-28
Attending: EMERGENCY MEDICINE
Payer: MEDICARE

## 2022-07-28 VITALS
HEIGHT: 70 IN | BODY MASS INDEX: 21.33 KG/M2 | TEMPERATURE: 98 F | RESPIRATION RATE: 18 BRPM | OXYGEN SATURATION: 99 % | WEIGHT: 149 LBS | HEART RATE: 72 BPM | SYSTOLIC BLOOD PRESSURE: 132 MMHG | DIASTOLIC BLOOD PRESSURE: 84 MMHG

## 2022-07-28 DIAGNOSIS — S22.41XA CLOSED FRACTURE OF MULTIPLE RIBS OF RIGHT SIDE, INITIAL ENCOUNTER: Primary | ICD-10-CM

## 2022-07-28 LAB
ALBUMIN SERPL-MCNC: 3.3 G/DL (ref 3.2–4.6)
ALBUMIN/GLOB SERPL: 1.1 {RATIO} (ref 1.2–3.5)
ALP SERPL-CCNC: 75 U/L (ref 50–136)
ALT SERPL-CCNC: 12 U/L (ref 12–65)
ANION GAP SERPL CALC-SCNC: 5 MMOL/L (ref 7–16)
AST SERPL-CCNC: 11 U/L (ref 15–37)
BASOPHILS # BLD: 0 K/UL (ref 0–0.2)
BASOPHILS NFR BLD: 1 % (ref 0–2)
BILIRUB SERPL-MCNC: 0.4 MG/DL (ref 0.2–1.1)
BUN SERPL-MCNC: 25 MG/DL (ref 8–23)
CALCIUM SERPL-MCNC: 8.9 MG/DL (ref 8.3–10.4)
CHLORIDE SERPL-SCNC: 109 MMOL/L (ref 98–107)
CO2 SERPL-SCNC: 29 MMOL/L (ref 21–32)
CREAT SERPL-MCNC: 1.2 MG/DL (ref 0.8–1.5)
DIFFERENTIAL METHOD BLD: ABNORMAL
EOSINOPHIL # BLD: 0.1 K/UL (ref 0–0.8)
EOSINOPHIL NFR BLD: 1 % (ref 0.5–7.8)
ERYTHROCYTE [DISTWIDTH] IN BLOOD BY AUTOMATED COUNT: 14.2 % (ref 11.9–14.6)
GLOBULIN SER CALC-MCNC: 3.1 G/DL (ref 2.3–3.5)
GLUCOSE SERPL-MCNC: 98 MG/DL (ref 65–100)
HCT VFR BLD AUTO: 36.4 % (ref 41.1–50.3)
HGB BLD-MCNC: 11.6 G/DL (ref 13.6–17.2)
IMM GRANULOCYTES # BLD AUTO: 0 K/UL (ref 0–0.5)
IMM GRANULOCYTES NFR BLD AUTO: 0 % (ref 0–5)
LYMPHOCYTES # BLD: 1.1 K/UL (ref 0.5–4.6)
LYMPHOCYTES NFR BLD: 29 % (ref 13–44)
MAGNESIUM SERPL-MCNC: 1.9 MG/DL (ref 1.8–2.4)
MCH RBC QN AUTO: 29.2 PG (ref 26.1–32.9)
MCHC RBC AUTO-ENTMCNC: 31.9 G/DL (ref 31.4–35)
MCV RBC AUTO: 91.7 FL (ref 79.6–97.8)
MONOCYTES # BLD: 0.4 K/UL (ref 0.1–1.3)
MONOCYTES NFR BLD: 11 % (ref 4–12)
NEUTS SEG # BLD: 2.2 K/UL (ref 1.7–8.2)
NEUTS SEG NFR BLD: 58 % (ref 43–78)
NRBC # BLD: 0 K/UL (ref 0–0.2)
PLATELET # BLD AUTO: 148 K/UL (ref 150–450)
PMV BLD AUTO: 9.6 FL (ref 9.4–12.3)
POTASSIUM SERPL-SCNC: 2.9 MMOL/L (ref 3.5–5.1)
PROT SERPL-MCNC: 6.4 G/DL (ref 6.3–8.2)
RBC # BLD AUTO: 3.97 M/UL (ref 4.23–5.6)
SODIUM SERPL-SCNC: 143 MMOL/L (ref 136–145)
WBC # BLD AUTO: 3.8 K/UL (ref 4.3–11.1)

## 2022-07-28 PROCEDURE — 83735 ASSAY OF MAGNESIUM: CPT

## 2022-07-28 PROCEDURE — 72125 CT NECK SPINE W/O DYE: CPT

## 2022-07-28 PROCEDURE — 80053 COMPREHEN METABOLIC PANEL: CPT

## 2022-07-28 PROCEDURE — 72072 X-RAY EXAM THORAC SPINE 3VWS: CPT

## 2022-07-28 PROCEDURE — 6370000000 HC RX 637 (ALT 250 FOR IP): Performed by: PHYSICIAN ASSISTANT

## 2022-07-28 PROCEDURE — 70450 CT HEAD/BRAIN W/O DYE: CPT

## 2022-07-28 PROCEDURE — 72170 X-RAY EXAM OF PELVIS: CPT

## 2022-07-28 PROCEDURE — 85025 COMPLETE CBC W/AUTO DIFF WBC: CPT

## 2022-07-28 PROCEDURE — 71101 X-RAY EXAM UNILAT RIBS/CHEST: CPT

## 2022-07-28 PROCEDURE — 99284 EMERGENCY DEPT VISIT MOD MDM: CPT

## 2022-07-28 PROCEDURE — 72110 X-RAY EXAM L-2 SPINE 4/>VWS: CPT

## 2022-07-28 RX ORDER — LIDOCAINE 4 G/G
1 PATCH TOPICAL
Status: DISCONTINUED | OUTPATIENT
Start: 2022-07-28 | End: 2022-07-28 | Stop reason: HOSPADM

## 2022-07-28 RX ORDER — LIDOCAINE 50 MG/G
1 PATCH TOPICAL DAILY
Qty: 30 PATCH | Refills: 0 | Status: SHIPPED | OUTPATIENT
Start: 2022-07-28 | End: 2022-08-27

## 2022-07-28 RX ORDER — MORPHINE SULFATE 15 MG/1
7.5 TABLET ORAL EVERY 6 HOURS PRN
Qty: 6 TABLET | Refills: 0 | Status: SHIPPED | OUTPATIENT
Start: 2022-07-28 | End: 2022-07-31

## 2022-07-28 ASSESSMENT — PAIN SCALES - GENERAL
PAINLEVEL_OUTOF10: 6
PAINLEVEL_OUTOF10: 5

## 2022-07-28 ASSESSMENT — PAIN - FUNCTIONAL ASSESSMENT
PAIN_FUNCTIONAL_ASSESSMENT: 0-10
PAIN_FUNCTIONAL_ASSESSMENT: 0-10

## 2022-07-28 ASSESSMENT — ENCOUNTER SYMPTOMS
SHORTNESS OF BREATH: 0
NAUSEA: 0
DIARRHEA: 0
BACK PAIN: 1
ABDOMINAL PAIN: 0
VOMITING: 0

## 2022-07-28 ASSESSMENT — PAIN DESCRIPTION - LOCATION: LOCATION: RIB CAGE

## 2022-07-28 ASSESSMENT — PAIN DESCRIPTION - ORIENTATION: ORIENTATION: RIGHT

## 2022-07-28 NOTE — ED PROVIDER NOTES
[employer] Emergency Department Provider Note                   PCP:                NOT ON FILE               Age: 77 y.o. Sex: male       ICD-10-CM    1. Closed fracture of multiple ribs of right side, initial encounter  S22.41XA morphine (MSIR) 15 MG tablet          DISPOSITION Decision To Discharge 07/28/2022 06:26:39 PM        MDM  Number of Diagnoses or Management Options  Closed fracture of multiple ribs of right side, initial encounter  Diagnosis management comments: Vital signs reviewed, patient stable, NAD, afebrile, nontoxic in appearance     Based on history, physical exam, will obtain CBC, CMP, magnesium, urine dip    Will obtain CT cervical spine, CT head without contrast  Will obtain x-ray thoracic spine, x-ray lumbar spine, x-ray pelvis, x-ray right ribs with a chest    Patient's physical exam is reassuring at this time. No findings on neuro exam.    Lab work and imaging have yet to be obtained at this time. Discussed this patient with oncDebra Adams she is willing to follow-up on the care of this patient. She was updated on patient's history, imaging studies ordered, lab work ordered. Time patient is in stable condition.        Amount and/or Complexity of Data Reviewed  Clinical lab tests: ordered (Still pending)  Tests in the radiology section of CPT®: ordered (Still pending)  Review and summarize past medical records: yes    Risk of Complications, Morbidity, and/or Mortality  Presenting problems: moderate  Diagnostic procedures: moderate  Management options: low    Patient Progress  Patient progress: stable       Orders Placed This Encounter   Procedures    CT CERVICAL SPINE WO CONTRAST    XR THORACIC SPINE (3 VIEWS)    XR LUMBAR SPINE (MIN 4 VIEWS)    CT HEAD WO CONTRAST    XR PELVIS (1-2 VIEWS)    XR RIBS RIGHT INCLUDE CHEST (MIN 3 VIEWS)    CBC with Auto Differential    Comprehensive Metabolic Panel    Magnesium    Incentive spirometry nursing        Leona Kelley is a 77 y.o. male who presents to the Emergency Department with chief complaint of    Chief Complaint   Patient presents with    Fall      77year-old male with history of DDD of lumbar region, spinal stenosis of lumbar region, foot drop right side, paroxysmal A. fib, hypertension, polypharmacy presents emergency department today via EMS with chief complaint of right side chest wall pain, low back pain, diarrhea. Patient states that he has been having diarrhea for the past 14 days. Denies fevers or chills, nausea vomiting abdominal pain chest pain or shortness of breath. Patient states he does not ambulate much due to chronic low back surgeries, but when he does he uses a walker. Patient states that walking is very difficult for him and 4 days ago he was walking to the kitchen and his right foot got caught and he fell onto his right side against the kitchen counter. Unsure if striking head. Patient denies loss of consciousness. Patient endorses neck pain, low back pain, right side chest wall pain. Denies changes to vision, dizziness or headache. Makes patient's condition better. Nothing makes patient's condition worse. No treatments tried. The history is provided by the patient. No  was used. Review of Systems   Constitutional:  Negative for chills and fever. Respiratory:  Negative for shortness of breath. Cardiovascular:  Negative for chest pain. Gastrointestinal:  Negative for abdominal pain, diarrhea, nausea and vomiting. Musculoskeletal:  Positive for back pain and neck pain. Right side Chest wall pain  Right side pelvic pain   Neurological:  Negative for headaches. All other systems reviewed and are negative. Past Medical History:   Diagnosis Date    Allergic rhinitis, cause unspecified 3/8/2013    Backache 1/8/2013      The patient is stable on the current treatment. Tolerating well. No sides effects. Will not adjust at this time.      CAD (coronary artery disease) 01/03/2013    MI age 62- no intervention; denies CP/SOB. unable to ambulate due to back hx    Cancer [facility])     prostate    Cardiomegaly 3/14/2017    Cervical spinal stenosis 10/3/2016    Followed by Dr. Ghassan Tan. Last Assessment & Plan:  MRI discussed with the patient, patient is following with Dr. Jessica Bran for further assessment and consideration of surgery. Chest pain     Chronic bilateral low back pain with right-sided sciatica 2/27/2019    Chronic pain     lumbar pain    Chronic right SI joint pain 2/27/2019    Closed T10 fracture ([facility] [address] 07/11/2019    Depression     Dizziness 3/8/2013    Dyslipidemia     Esophageal stenosis 8/16/2013    dxed on EGD early 2000's. Never dilated    Esophagitis 1/3/2013    GERD with esophagitis     not completely controlled with zantac    HTN (hypertension) 1/3/2013    controlled with med    Kidney stones 1/3/2013    Narcolepsy 1/3/2013    Prostate cancer ([facility] [address] 1/3/2013    Vertigo, benign positional         Past Surgical History:   Procedure Laterality Date    BACK SURGERY  03/05/2020    T2-T5 POSTEROLATERAL FUSION W/ ALLOGRAFT LOCAL AUTOGRAFT, REVISION OF HARDWARE    CARPAL TUNNEL RELEASE Bilateral     Right (1995)   Left (2004)    CHOLECYSTECTOMY  2008? LITHOTRIPSY      LUMBAR LAMINECTOMY      x6 Dr. Libia Grant  2008?     Dr. Regina Segovia  5/7/2021             Family History   Problem Relation Age of Onset    Alcohol Abuse Father     Depression Mother     Depression Sister     Depression Brother     Depression Brother     Depression Sister     Depression Sister         Social History     Socioeconomic History    Marital status:      Spouse name: None    Number of children: None    Years of education: None    Highest education level: None   Tobacco Use    Smoking status: Never    Smokeless tobacco: Never   Substance and Sexual Activity    Alcohol use: No    Drug use: No         Tramadol     Discharge Medication List as of 7/28/2022  5:34 PM        CONTINUE these medications which have NOT CHANGED    Details   amphetamine-dextroamphetamine (ADDERALL) 30 MG tablet 1 po TID for June 2019 Indications: Recurring Sleep Episodes During the DayHistorical Med      apixaban (ELIQUIS) 5 MG TABS tablet Take 5 mg by mouth 2 times dailyHistorical Med      busPIRone (BUSPAR) 15 MG tablet Take 15 mg by mouth dailyHistorical Med      gabapentin (NEURONTIN) 300 MG capsule Take 300 mg by mouth 3 times daily. Historical Med      metoprolol succinate (TOPROL XL) 50 MG extended release tablet TAKE ONE TABLET BY MOUTH EVERY DAYHistorical Med      nitroGLYCERIN (NITROSTAT) 0.4 MG SL tablet Place 0.4 mg under the tongueHistorical Med      omeprazole (PRILOSEC) 40 MG delayed release capsule Take 40 mg by mouth dailyHistorical Med      sertraline (ZOLOFT) 100 MG tablet Take 200 mg by mouth dailyHistorical Med              Vitals signs and nursing note reviewed. No data found. Physical Exam  Vitals and nursing note reviewed. Constitutional:       General: He is not in acute distress. Appearance: Normal appearance. He is normal weight. He is not ill-appearing, toxic-appearing or diaphoretic. HENT:      Head: Normocephalic and atraumatic. No raccoon eyes, Brenner's sign, abrasion, contusion, masses, right periorbital erythema, left periorbital erythema or laceration. Right Ear: Tympanic membrane, ear canal and external ear normal.      Left Ear: Tympanic membrane, ear canal and external ear normal.      Mouth/Throat:      Lips: Pink. Mouth: Mucous membranes are moist.      Tongue: No lesions. Tongue does not deviate from midline. Palate: No mass and lesions. Pharynx: Oropharynx is clear. Uvula midline. No pharyngeal swelling, oropharyngeal exudate, posterior oropharyngeal erythema or uvula swelling. Tonsils: No tonsillar exudate or tonsillar abscesses. 0 on the right. 0 on the left.    Eyes: General: No visual field deficit or scleral icterus. Extraocular Movements: Extraocular movements intact. Conjunctiva/sclera: Conjunctivae normal.   Cardiovascular:      Rate and Rhythm: Normal rate. Pulses: Normal pulses. Heart sounds: Normal heart sounds. Pulmonary:      Effort: Pulmonary effort is normal. No respiratory distress. Breath sounds: Normal breath sounds. No stridor. No wheezing, rhonchi or rales. Abdominal:      General: Bowel sounds are normal.      Palpations: Abdomen is soft. Tenderness: There is no abdominal tenderness. Musculoskeletal:         General: Tenderness (Tenderness to palpation midline cervical spine, diffusely throughout right chest wall, right inguinal area and posterior aspect of right side pelvis) present. Normal range of motion. Cervical back: Normal range of motion. Tenderness present. No spasms. Normal range of motion. Thoracic back: Tenderness present. No spasms. Lumbar back: Tenderness present. No lacerations or spasms. Negative right straight leg raise test and negative left straight leg raise test.        Back:       Comments: Tenderness to palpation along midline C-spine, T-spine, L-spine  Large surgical scar present from C-spine to L-spine   Skin:     General: Skin is warm and dry. Capillary Refill: Capillary refill takes less than 2 seconds. Findings: No bruising (No ecchymosis noted on chest wall anterior posteriorly; no ecchymosis noted on abdominal wall, back). Neurological:      General: No focal deficit present. Mental Status: He is alert and oriented to person, place, and time. GCS: GCS eye subscore is 4. GCS verbal subscore is 5. GCS motor subscore is 6. Cranial Nerves: Cranial nerves are intact. No cranial nerve deficit, dysarthria or facial asymmetry. Sensory: Sensation is intact. No sensory deficit. Motor: Motor function is intact. No pronator drift.       Coordination: Voice dictation software was used during the making of this note. This software is not perfect and grammatical and other typographical errors may be present. This note has not been completely proofread for errors.       Linda Bateman  07/29/22 [id number]

## 2022-07-28 NOTE — ED TRIAGE NOTES
Pt brought in by EMS from home for fall. Pt was standing at the sink in the kitchen and his legs gave out. He landed against the counter. This happened 3 days ago. Pt with right sided rib pain that radiates around to back.

## 2022-07-28 NOTE — DISCHARGE INSTRUCTIONS
Take deep breaths multiple times a day. The incentive spirometer multiple times a day to take deep breaths to prevent your lungs from developing pneumonia. Only use the pain medicine if needed. Follow-up with your primary care physician for recheck. Turn to the ED if worsening in any way.

## 2022-07-28 NOTE — ED NOTES
Care of patient assumed from [place], [place] at shift change. Patient is resting comfortably on the stretcher. X-rays indicate fractures of the seventh, eighth and ninth ribs on the right side. No pneumothorax. No other acute injuries. He has no pain to his abdomen or bruising. This was 3 days ago. His vital signs are stable. He does have a primary care physician to follow-up with. Patient refuses admission and would like to go home as he has a wife on hospice and they help take care of each other. I will send him with some pain medicine if needed. He does not even want to take that. He can use warm moist heat to the area, deep breaths multiple times a day and return to the ED if worsening in any way. He is stable for discharge at this time. He was told to use his wheelchair at home to avoid ambulating until he is feeling better and then he can use his walker. He expresses understanding and is stable for discharge.      BRANDI Vela  07/28/22 [id number]

## 2023-06-19 NOTE — ED NOTES
TRANSFER - OUT REPORT:    Verbal report given to RN Andi Kiser on Betty Hammond  being transferred to 3rd Floor for routine progression of care       Report consisted of patients Situation, Background, Assessment and   Recommendations(SBAR). Information from the following report(s) SBAR, ED Summary, Intake/Output, MAR and Recent Results was reviewed with the receiving nurse. Lines:   Peripheral IV 11/04/21 Left; Outer Antecubital (Active)   Site Assessment Clean, dry, & intact 11/04/21 1906   Phlebitis Assessment 0 11/04/21 1906   Infiltration Assessment 0 11/04/21 1906   Dressing Status Clean, dry, & intact 11/04/21 1906   Hub Color/Line Status Green 11/04/21 1906        Opportunity for questions and clarification was provided.       Patient transported with:   Amura Griseofulvin Counseling:  I discussed with the patient the risks of griseofulvin including but not limited to photosensitivity, cytopenia, liver damage, nausea/vomiting and severe allergy.  The patient understands that this medication is best absorbed when taken with a fatty meal (e.g., ice cream or french fries).

## 2023-07-25 NOTE — ROUTINE PROCESS
Bedside and Verbal shift change report given to self (oncoming nurse) by Claude Randy, RN (offgoing nurse). Report included the following information SBAR, Kardex, Intake/Output, MAR and Recent Results. Erin Clarke (:  1982) is a 37 y.o. female,Established patient, here for evaluation of the following chief complaint(s): Other (Fever , chills, body aches, sore throat, runny nose , coughing (No sputum) sinus pressure. started yesterday. Hasn't been around anyone she is aware of sick .  )         ASSESSMENT/PLAN:  1. COVID-19  -     nirmatrelvir/ritonavir 300/100 (PAXLOVID, 300/100,) 20 x 150 MG & 10 x 100MG TBPK; Take 3 tablets (two 150 mg nirmatrelvir and one 100 mg ritonavir tablets) by mouth every 12 hours for 5 days. , Disp-30 tablet, R-0Normal  2. Fever, unspecified fever cause  -     POCT COVID-19 Rapid, NAAT  -     POCT rapid strep A  3. Nasal congestion  -     POCT COVID-19 Rapid, NAAT  4. Sore throat  -     POCT COVID-19 Rapid, NAAT  -     POCT rapid strep A      No follow-ups on file.       + COVID. Mask/isolate at home 5 days. Mask for 10 days   Call surgery to see about mastectomy next week due to covid. Meds sent to [facility]. ED if any worsening SOB, fever, other concerns. Just finished chemo two months ago. Had mastectomy scheduled for yesterday.  is sick as well. Subjective   SUBJECTIVE/OBJECTIVE:  Fever at home 100.7    URI   This is a new problem. The current episode started yesterday. The problem has been gradually worsening. The maximum temperature recorded prior to her arrival was 100.4 - 100.9 F. Associated symptoms include congestion, coughing, headaches, a plugged ear sensation, rhinorrhea, sinus pain and a sore throat. Pertinent negatives include no abdominal pain, chest pain, diarrhea, ear pain, nausea, swollen glands, vomiting or wheezing. She has tried acetaminophen for the symptoms. The treatment provided no relief. Review of Systems   Constitutional:  Positive for chills, diaphoresis and fever. HENT:  Positive for congestion, postnasal drip, rhinorrhea, sinus pressure, sinus pain, sore throat and voice change. Negative for ear pain.

## 2023-09-29 ENCOUNTER — HOSPITAL ENCOUNTER (INPATIENT)
Age: 78
LOS: 1 days | Discharge: HOME | DRG: 247 | End: 2023-09-30
Attending: EMERGENCY MEDICINE | Admitting: INTERNAL MEDICINE
Payer: MEDICARE

## 2023-09-29 ENCOUNTER — APPOINTMENT (OUTPATIENT)
Dept: GENERAL RADIOLOGY | Age: 78
DRG: 247 | End: 2023-09-29
Payer: MEDICARE

## 2023-09-29 ENCOUNTER — APPOINTMENT (OUTPATIENT)
Dept: CT IMAGING | Age: 78
DRG: 247 | End: 2023-09-29
Payer: MEDICARE

## 2023-09-29 DIAGNOSIS — Z95.5 S/P PRIMARY ANGIOPLASTY WITH CORONARY STENT: ICD-10-CM

## 2023-09-29 DIAGNOSIS — I21.4 NSTEMI (NON-ST ELEVATED MYOCARDIAL INFARCTION) (HCC): ICD-10-CM

## 2023-09-29 DIAGNOSIS — R07.9 CHEST PAIN, UNSPECIFIED TYPE: Primary | ICD-10-CM

## 2023-09-29 DIAGNOSIS — R07.9 CHEST PAIN: ICD-10-CM

## 2023-09-29 DIAGNOSIS — Z95.5 S/P CORONARY ARTERY STENT PLACEMENT: ICD-10-CM

## 2023-09-29 DIAGNOSIS — R79.89 ELEVATED TROPONIN I LEVEL: ICD-10-CM

## 2023-09-29 DIAGNOSIS — K59.00 CONSTIPATION, UNSPECIFIED CONSTIPATION TYPE: ICD-10-CM

## 2023-09-29 PROBLEM — I48.0 PAF (PAROXYSMAL ATRIAL FIBRILLATION) (HCC): Status: ACTIVE | Noted: 2021-12-13

## 2023-09-29 LAB
ANION GAP SERPL CALC-SCNC: 5 MMOL/L (ref 2–11)
APTT PPP: 38.3 SEC (ref 24.5–34.2)
APTT PPP: 61.8 SEC (ref 24.5–34.2)
BASOPHILS # BLD: 0 K/UL (ref 0–0.2)
BASOPHILS NFR BLD: 1 % (ref 0–2)
BUN SERPL-MCNC: 25 MG/DL (ref 8–23)
CALCIUM SERPL-MCNC: 8.4 MG/DL (ref 8.3–10.4)
CHLORIDE SERPL-SCNC: 114 MMOL/L (ref 101–110)
CO2 SERPL-SCNC: 26 MMOL/L (ref 21–32)
CREAT SERPL-MCNC: 1.3 MG/DL (ref 0.8–1.5)
DIFFERENTIAL METHOD BLD: ABNORMAL
ECHO BSA: 1.83 M2
EKG ATRIAL RATE: 59 BPM
EKG ATRIAL RATE: 63 BPM
EKG DIAGNOSIS: NORMAL
EKG DIAGNOSIS: NORMAL
EKG P AXIS: 22 DEGREES
EKG P AXIS: 38 DEGREES
EKG P-R INTERVAL: 139 MS
EKG P-R INTERVAL: 150 MS
EKG Q-T INTERVAL: 440 MS
EKG Q-T INTERVAL: 466 MS
EKG QRS DURATION: 86 MS
EKG QRS DURATION: 88 MS
EKG QTC CALCULATION (BAZETT): 450 MS
EKG QTC CALCULATION (BAZETT): 462 MS
EKG R AXIS: -26 DEGREES
EKG R AXIS: 46 DEGREES
EKG T AXIS: -49 DEGREES
EKG T AXIS: 3 DEGREES
EKG VENTRICULAR RATE: 59 BPM
EKG VENTRICULAR RATE: 63 BPM
EOSINOPHIL # BLD: 0.1 K/UL (ref 0–0.8)
EOSINOPHIL NFR BLD: 1 % (ref 0.5–7.8)
ERYTHROCYTE [DISTWIDTH] IN BLOOD BY AUTOMATED COUNT: 14.6 % (ref 11.9–14.6)
GLUCOSE SERPL-MCNC: 94 MG/DL (ref 65–100)
HCT VFR BLD AUTO: 36.4 % (ref 41.1–50.3)
HGB BLD-MCNC: 11.4 G/DL (ref 13.6–17.2)
IMM GRANULOCYTES # BLD AUTO: 0 K/UL (ref 0–0.5)
IMM GRANULOCYTES NFR BLD AUTO: 0 % (ref 0–5)
INR PPP: 1.5
LYMPHOCYTES # BLD: 1 K/UL (ref 0.5–4.6)
LYMPHOCYTES NFR BLD: 23 % (ref 13–44)
MAGNESIUM SERPL-MCNC: 1.8 MG/DL (ref 1.8–2.4)
MCH RBC QN AUTO: 28.9 PG (ref 26.1–32.9)
MCHC RBC AUTO-ENTMCNC: 31.3 G/DL (ref 31.4–35)
MCV RBC AUTO: 92.4 FL (ref 82–102)
MONOCYTES # BLD: 0.5 K/UL (ref 0.1–1.3)
MONOCYTES NFR BLD: 12 % (ref 4–12)
NEUTS SEG # BLD: 2.7 K/UL (ref 1.7–8.2)
NEUTS SEG NFR BLD: 63 % (ref 43–78)
NRBC # BLD: 0 K/UL (ref 0–0.2)
PLATELET # BLD AUTO: 143 K/UL (ref 150–450)
PMV BLD AUTO: 9.9 FL (ref 9.4–12.3)
POTASSIUM SERPL-SCNC: 3.6 MMOL/L (ref 3.5–5.1)
PROTHROMBIN TIME: 18.1 SEC (ref 12.6–14.3)
RBC # BLD AUTO: 3.94 M/UL (ref 4.23–5.6)
SODIUM SERPL-SCNC: 145 MMOL/L (ref 133–143)
TROPONIN I SERPL HS-MCNC: 104.4 PG/ML (ref 0–14)
TROPONIN I SERPL HS-MCNC: 105 PG/ML (ref 0–14)
UFH PPP CHRO-ACNC: >1.1 IU/ML (ref 0.3–0.7)
UFH PPP CHRO-ACNC: >1.1 IU/ML (ref 0.3–0.7)
WBC # BLD AUTO: 4.3 K/UL (ref 4.3–11.1)

## 2023-09-29 PROCEDURE — 99285 EMERGENCY DEPT VISIT HI MDM: CPT

## 2023-09-29 PROCEDURE — 85520 HEPARIN ASSAY: CPT

## 2023-09-29 PROCEDURE — 027035Z DILATION OF CORONARY ARTERY, ONE ARTERY WITH TWO DRUG-ELUTING INTRALUMINAL DEVICES, PERCUTANEOUS APPROACH: ICD-10-PCS | Performed by: INTERNAL MEDICINE

## 2023-09-29 PROCEDURE — C1769 GUIDE WIRE: HCPCS | Performed by: INTERNAL MEDICINE

## 2023-09-29 PROCEDURE — 74174 CTA ABD&PLVS W/CONTRAST: CPT

## 2023-09-29 PROCEDURE — 93458 L HRT ARTERY/VENTRICLE ANGIO: CPT | Performed by: INTERNAL MEDICINE

## 2023-09-29 PROCEDURE — 2580000003 HC RX 258: Performed by: INTERNAL MEDICINE

## 2023-09-29 PROCEDURE — C1725 CATH, TRANSLUMIN NON-LASER: HCPCS | Performed by: INTERNAL MEDICINE

## 2023-09-29 PROCEDURE — C1894 INTRO/SHEATH, NON-LASER: HCPCS | Performed by: INTERNAL MEDICINE

## 2023-09-29 PROCEDURE — C1887 CATHETER, GUIDING: HCPCS | Performed by: INTERNAL MEDICINE

## 2023-09-29 PROCEDURE — 83735 ASSAY OF MAGNESIUM: CPT

## 2023-09-29 PROCEDURE — 2709999900 HC NON-CHARGEABLE SUPPLY: Performed by: INTERNAL MEDICINE

## 2023-09-29 PROCEDURE — 85025 COMPLETE CBC W/AUTO DIFF WBC: CPT

## 2023-09-29 PROCEDURE — 99152 MOD SED SAME PHYS/QHP 5/>YRS: CPT | Performed by: INTERNAL MEDICINE

## 2023-09-29 PROCEDURE — 36415 COLL VENOUS BLD VENIPUNCTURE: CPT

## 2023-09-29 PROCEDURE — 80048 BASIC METABOLIC PNL TOTAL CA: CPT

## 2023-09-29 PROCEDURE — 51701 INSERT BLADDER CATHETER: CPT

## 2023-09-29 PROCEDURE — B2111ZZ FLUOROSCOPY OF MULTIPLE CORONARY ARTERIES USING LOW OSMOLAR CONTRAST: ICD-10-PCS | Performed by: INTERNAL MEDICINE

## 2023-09-29 PROCEDURE — 6360000004 HC RX CONTRAST MEDICATION: Performed by: EMERGENCY MEDICINE

## 2023-09-29 PROCEDURE — C9600 PERC DRUG-EL COR STENT SING: HCPCS | Performed by: INTERNAL MEDICINE

## 2023-09-29 PROCEDURE — 2500000003 HC RX 250 WO HCPCS: Performed by: INTERNAL MEDICINE

## 2023-09-29 PROCEDURE — 93010 ELECTROCARDIOGRAM REPORT: CPT | Performed by: INTERNAL MEDICINE

## 2023-09-29 PROCEDURE — G0378 HOSPITAL OBSERVATION PER HR: HCPCS

## 2023-09-29 PROCEDURE — 6360000002 HC RX W HCPCS: Performed by: INTERNAL MEDICINE

## 2023-09-29 PROCEDURE — C1874 STENT, COATED/COV W/DEL SYS: HCPCS | Performed by: INTERNAL MEDICINE

## 2023-09-29 PROCEDURE — 85730 THROMBOPLASTIN TIME PARTIAL: CPT

## 2023-09-29 PROCEDURE — 84484 ASSAY OF TROPONIN QUANT: CPT

## 2023-09-29 PROCEDURE — 92928 PRQ TCAT PLMT NTRAC ST 1 LES: CPT | Performed by: INTERNAL MEDICINE

## 2023-09-29 PROCEDURE — B2151ZZ FLUOROSCOPY OF LEFT HEART USING LOW OSMOLAR CONTRAST: ICD-10-PCS | Performed by: INTERNAL MEDICINE

## 2023-09-29 PROCEDURE — 99223 1ST HOSP IP/OBS HIGH 75: CPT | Performed by: INTERNAL MEDICINE

## 2023-09-29 PROCEDURE — 99153 MOD SED SAME PHYS/QHP EA: CPT | Performed by: INTERNAL MEDICINE

## 2023-09-29 PROCEDURE — 85610 PROTHROMBIN TIME: CPT

## 2023-09-29 PROCEDURE — 6360000004 HC RX CONTRAST MEDICATION: Performed by: INTERNAL MEDICINE

## 2023-09-29 PROCEDURE — 93005 ELECTROCARDIOGRAM TRACING: CPT | Performed by: INTERNAL MEDICINE

## 2023-09-29 PROCEDURE — 4A023N7 MEASUREMENT OF CARDIAC SAMPLING AND PRESSURE, LEFT HEART, PERCUTANEOUS APPROACH: ICD-10-PCS | Performed by: INTERNAL MEDICINE

## 2023-09-29 PROCEDURE — 71046 X-RAY EXAM CHEST 2 VIEWS: CPT

## 2023-09-29 PROCEDURE — 93005 ELECTROCARDIOGRAM TRACING: CPT | Performed by: EMERGENCY MEDICINE

## 2023-09-29 PROCEDURE — 6370000000 HC RX 637 (ALT 250 FOR IP): Performed by: INTERNAL MEDICINE

## 2023-09-29 DEVICE — STENT ONYXNG30018UX ONYX 3.00X18RX
Type: IMPLANTABLE DEVICE | Status: FUNCTIONAL
Brand: ONYX FRONTIER™

## 2023-09-29 DEVICE — STENT ONYXNG25015UX ONYX 2.50X15RX
Type: IMPLANTABLE DEVICE | Status: FUNCTIONAL
Brand: ONYX FRONTIER™

## 2023-09-29 RX ORDER — ATORVASTATIN CALCIUM 40 MG/1
40 TABLET, FILM COATED ORAL NIGHTLY
Status: DISCONTINUED | OUTPATIENT
Start: 2023-09-30 | End: 2023-09-30 | Stop reason: HOSPADM

## 2023-09-29 RX ORDER — HEPARIN SODIUM 1000 [USP'U]/ML
30 INJECTION, SOLUTION INTRAVENOUS; SUBCUTANEOUS PRN
Status: DISCONTINUED | OUTPATIENT
Start: 2023-09-29 | End: 2023-09-29

## 2023-09-29 RX ORDER — SERTRALINE HYDROCHLORIDE 100 MG/1
200 TABLET, FILM COATED ORAL DAILY
Status: DISCONTINUED | OUTPATIENT
Start: 2023-09-30 | End: 2023-09-30 | Stop reason: HOSPADM

## 2023-09-29 RX ORDER — HEPARIN SODIUM 10000 [USP'U]/100ML
5-30 INJECTION, SOLUTION INTRAVENOUS CONTINUOUS
Status: DISCONTINUED | OUTPATIENT
Start: 2023-09-29 | End: 2023-09-29

## 2023-09-29 RX ORDER — HEPARIN SODIUM 1000 [USP'U]/ML
4000 INJECTION, SOLUTION INTRAVENOUS; SUBCUTANEOUS PRN
Status: DISCONTINUED | OUTPATIENT
Start: 2023-09-29 | End: 2023-09-29

## 2023-09-29 RX ORDER — 0.9 % SODIUM CHLORIDE 0.9 %
200 INTRAVENOUS SOLUTION INTRAVENOUS ONCE
Status: DISCONTINUED | OUTPATIENT
Start: 2023-09-29 | End: 2023-09-30 | Stop reason: HOSPADM

## 2023-09-29 RX ORDER — ASPIRIN 81 MG/1
81 TABLET ORAL DAILY
Status: DISCONTINUED | OUTPATIENT
Start: 2023-09-30 | End: 2023-09-29

## 2023-09-29 RX ORDER — MAGNESIUM HYDROXIDE/ALUMINUM HYDROXICE/SIMETHICONE 120; 1200; 1200 MG/30ML; MG/30ML; MG/30ML
SUSPENSION ORAL PRN
Status: DISCONTINUED | OUTPATIENT
Start: 2023-09-29 | End: 2023-09-29 | Stop reason: HOSPADM

## 2023-09-29 RX ORDER — ACETAMINOPHEN 325 MG/1
650 TABLET ORAL EVERY 6 HOURS PRN
Status: DISCONTINUED | OUTPATIENT
Start: 2023-09-29 | End: 2023-09-30 | Stop reason: HOSPADM

## 2023-09-29 RX ORDER — LIDOCAINE HYDROCHLORIDE 10 MG/ML
INJECTION, SOLUTION INFILTRATION; PERINEURAL PRN
Status: DISCONTINUED | OUTPATIENT
Start: 2023-09-29 | End: 2023-09-29 | Stop reason: HOSPADM

## 2023-09-29 RX ORDER — POTASSIUM CHLORIDE 7.45 MG/ML
10 INJECTION INTRAVENOUS PRN
Status: DISCONTINUED | OUTPATIENT
Start: 2023-09-29 | End: 2023-09-30 | Stop reason: HOSPADM

## 2023-09-29 RX ORDER — HEPARIN SODIUM 1000 [USP'U]/ML
60 INJECTION, SOLUTION INTRAVENOUS; SUBCUTANEOUS ONCE
Status: DISCONTINUED | OUTPATIENT
Start: 2023-09-29 | End: 2023-09-29

## 2023-09-29 RX ORDER — SODIUM CHLORIDE 0.9 % (FLUSH) 0.9 %
5-40 SYRINGE (ML) INJECTION EVERY 12 HOURS SCHEDULED
Status: DISCONTINUED | OUTPATIENT
Start: 2023-09-29 | End: 2023-09-29

## 2023-09-29 RX ORDER — POLYETHYLENE GLYCOL 3350 17 G/17G
17 POWDER, FOR SOLUTION ORAL DAILY PRN
Status: DISCONTINUED | OUTPATIENT
Start: 2023-09-29 | End: 2023-09-30 | Stop reason: HOSPADM

## 2023-09-29 RX ORDER — LISINOPRIL 5 MG/1
2.5 TABLET ORAL DAILY
Status: DISCONTINUED | OUTPATIENT
Start: 2023-09-30 | End: 2023-09-30 | Stop reason: HOSPADM

## 2023-09-29 RX ORDER — HEPARIN SODIUM 1000 [USP'U]/ML
2000 INJECTION, SOLUTION INTRAVENOUS; SUBCUTANEOUS PRN
Status: DISCONTINUED | OUTPATIENT
Start: 2023-09-29 | End: 2023-09-29

## 2023-09-29 RX ORDER — HEPARIN SODIUM 1000 [USP'U]/ML
60 INJECTION, SOLUTION INTRAVENOUS; SUBCUTANEOUS PRN
Status: DISCONTINUED | OUTPATIENT
Start: 2023-09-29 | End: 2023-09-29

## 2023-09-29 RX ORDER — SODIUM CHLORIDE 0.9 % (FLUSH) 0.9 %
5-40 SYRINGE (ML) INJECTION PRN
Status: DISCONTINUED | OUTPATIENT
Start: 2023-09-29 | End: 2023-09-29

## 2023-09-29 RX ORDER — METOPROLOL SUCCINATE 25 MG/1
50 TABLET, EXTENDED RELEASE ORAL DAILY
Status: DISCONTINUED | OUTPATIENT
Start: 2023-09-30 | End: 2023-09-30 | Stop reason: HOSPADM

## 2023-09-29 RX ORDER — SODIUM CHLORIDE 9 MG/ML
INJECTION, SOLUTION INTRAVENOUS PRN
Status: DISCONTINUED | OUTPATIENT
Start: 2023-09-29 | End: 2023-09-30 | Stop reason: HOSPADM

## 2023-09-29 RX ORDER — BIVALIRUDIN 250 MG/5ML
INJECTION, POWDER, LYOPHILIZED, FOR SOLUTION INTRAVENOUS PRN
Status: DISCONTINUED | OUTPATIENT
Start: 2023-09-29 | End: 2023-09-29 | Stop reason: HOSPADM

## 2023-09-29 RX ORDER — POTASSIUM CHLORIDE 20 MEQ/1
40 TABLET, EXTENDED RELEASE ORAL PRN
Status: DISCONTINUED | OUTPATIENT
Start: 2023-09-29 | End: 2023-09-30 | Stop reason: HOSPADM

## 2023-09-29 RX ORDER — ACETAMINOPHEN 325 MG/1
650 TABLET ORAL EVERY 4 HOURS PRN
Status: DISCONTINUED | OUTPATIENT
Start: 2023-09-29 | End: 2023-09-30 | Stop reason: HOSPADM

## 2023-09-29 RX ORDER — ONDANSETRON 2 MG/ML
4 INJECTION INTRAMUSCULAR; INTRAVENOUS EVERY 6 HOURS PRN
Status: DISCONTINUED | OUTPATIENT
Start: 2023-09-29 | End: 2023-09-30 | Stop reason: HOSPADM

## 2023-09-29 RX ORDER — NITROGLYCERIN 0.4 MG/1
0.4 TABLET SUBLINGUAL EVERY 5 MIN PRN
Status: DISCONTINUED | OUTPATIENT
Start: 2023-09-29 | End: 2023-09-30 | Stop reason: HOSPADM

## 2023-09-29 RX ORDER — GABAPENTIN 300 MG/1
300 CAPSULE ORAL 3 TIMES DAILY
Status: DISCONTINUED | OUTPATIENT
Start: 2023-09-29 | End: 2023-09-30 | Stop reason: HOSPADM

## 2023-09-29 RX ORDER — PANTOPRAZOLE SODIUM 40 MG/1
40 TABLET, DELAYED RELEASE ORAL
Status: DISCONTINUED | OUTPATIENT
Start: 2023-09-30 | End: 2023-09-30 | Stop reason: HOSPADM

## 2023-09-29 RX ORDER — ONDANSETRON 4 MG/1
4 TABLET, ORALLY DISINTEGRATING ORAL EVERY 8 HOURS PRN
Status: DISCONTINUED | OUTPATIENT
Start: 2023-09-29 | End: 2023-09-30 | Stop reason: HOSPADM

## 2023-09-29 RX ORDER — MAGNESIUM SULFATE IN WATER 40 MG/ML
2000 INJECTION, SOLUTION INTRAVENOUS PRN
Status: DISCONTINUED | OUTPATIENT
Start: 2023-09-29 | End: 2023-09-30 | Stop reason: HOSPADM

## 2023-09-29 RX ORDER — ASPIRIN 81 MG/1
81 TABLET, CHEWABLE ORAL DAILY
Status: DISCONTINUED | OUTPATIENT
Start: 2023-09-30 | End: 2023-09-30 | Stop reason: HOSPADM

## 2023-09-29 RX ORDER — SODIUM CHLORIDE 0.9 % (FLUSH) 0.9 %
5-40 SYRINGE (ML) INJECTION PRN
Status: DISCONTINUED | OUTPATIENT
Start: 2023-09-29 | End: 2023-09-30 | Stop reason: HOSPADM

## 2023-09-29 RX ORDER — SODIUM CHLORIDE 0.9 % (FLUSH) 0.9 %
5-40 SYRINGE (ML) INJECTION EVERY 12 HOURS SCHEDULED
Status: DISCONTINUED | OUTPATIENT
Start: 2023-09-29 | End: 2023-09-30 | Stop reason: HOSPADM

## 2023-09-29 RX ORDER — CLOPIDOGREL BISULFATE 75 MG/1
75 TABLET ORAL DAILY
Status: DISCONTINUED | OUTPATIENT
Start: 2023-09-30 | End: 2023-09-30 | Stop reason: HOSPADM

## 2023-09-29 RX ORDER — SODIUM CHLORIDE 9 MG/ML
INJECTION, SOLUTION INTRAVENOUS CONTINUOUS
Status: DISCONTINUED | OUTPATIENT
Start: 2023-09-29 | End: 2023-09-29

## 2023-09-29 RX ORDER — MIDAZOLAM HYDROCHLORIDE 1 MG/ML
INJECTION INTRAMUSCULAR; INTRAVENOUS PRN
Status: DISCONTINUED | OUTPATIENT
Start: 2023-09-29 | End: 2023-09-29 | Stop reason: HOSPADM

## 2023-09-29 RX ORDER — MORPHINE SULFATE 4 MG/ML
4 INJECTION, SOLUTION INTRAMUSCULAR; INTRAVENOUS ONCE
Status: DISCONTINUED | OUTPATIENT
Start: 2023-09-29 | End: 2023-09-30 | Stop reason: HOSPADM

## 2023-09-29 RX ORDER — CLOPIDOGREL BISULFATE 75 MG/1
TABLET ORAL PRN
Status: DISCONTINUED | OUTPATIENT
Start: 2023-09-29 | End: 2023-09-29 | Stop reason: HOSPADM

## 2023-09-29 RX ORDER — SODIUM CHLORIDE 9 MG/ML
INJECTION, SOLUTION INTRAVENOUS CONTINUOUS
Status: ACTIVE | OUTPATIENT
Start: 2023-09-29 | End: 2023-09-30

## 2023-09-29 RX ADMIN — SODIUM CHLORIDE: 9 INJECTION, SOLUTION INTRAVENOUS at 18:55

## 2023-09-29 RX ADMIN — IOPAMIDOL 100 ML: 755 INJECTION, SOLUTION INTRAVENOUS at 14:22

## 2023-09-29 RX ADMIN — SODIUM CHLORIDE, PRESERVATIVE FREE 10 ML: 5 INJECTION INTRAVENOUS at 20:27

## 2023-09-29 ASSESSMENT — ENCOUNTER SYMPTOMS
GASTROINTESTINAL NEGATIVE: 1
EYES NEGATIVE: 1
RESPIRATORY NEGATIVE: 1

## 2023-09-29 ASSESSMENT — PAIN SCALES - GENERAL: PAINLEVEL_OUTOF10: 3

## 2023-09-29 ASSESSMENT — PAIN DESCRIPTION - LOCATION: LOCATION: CHEST

## 2023-09-29 ASSESSMENT — PAIN - FUNCTIONAL ASSESSMENT: PAIN_FUNCTIONAL_ASSESSMENT: 0-10

## 2023-09-29 NOTE — PROGRESS NOTES
[facility] Dr Presley Vaughan  Stent x2 LAD  RRA - 14 ml air in band @ 1658  Versed 1 mg IV  Fent 25 mcg IV  Plavix 600 mg PO  Mylanta 30 ml PO  Angiomax during case

## 2023-09-29 NOTE — ED PROVIDER NOTES
Emergency Department Provider Note       PCP: NOT ON FILE   Age: 69 y.o. Sex: male     DISPOSITION Decision To Admit 09/29/2023 03:06:42 PM       ICD-10-CM    1. Chest pain, unspecified type  R07.9       2. Elevated troponin I level  R77.8       3. Constipation, unspecified constipation type  K59.00           Medical Decision Making     Complexity of Problems Addressed:  1 or more acute illnesses that pose a threat to life or bodily function. Data Reviewed and Analyzed:   I independently ordered and reviewed each unique test.  I reviewed external records: provider visit note from PCP. I reviewed external records: provider visit note from outside specialist.  I reviewed external records: previous EKG including cardiologist interpretation. I reviewed external records: previous lab results from outside ED. I reviewed external records: previous imaging study including radiologist interpretation. I independently ordered and interpreted the ED EKG in the absence of a Cardiologist.    Rate: 59  EKG Interpretation: EKG Interpretation: sinus rhythm, no evidence of arrhythmia  ST Segments: Normal ST segments - NO STEMI      I interpreted the X-rays no pneumothorax. Discussion of management or test interpretation. DDX:     CHF, COPD, pneumonia, PE,    MI, coronary artery disease, unstable angina, coronary artery disease,    Atrial fibrillation, cardiac arrhythmia, PVC, medication induced palpitations, heart block,  electrolyte induced palpitations,    Aortic dissection, aortic aneurysm,    GERD, musculoskeletal pain, costochondritis, rib fracture, pleurisy,    The management of this patient was discussed with an external consultant. Risk of Complications and/or Morbidity of Patient Management:  Shared medical decision making was utilized in creating the patients health plan today.     ED Course as of 09/29/23 1528   Fri Sep 29, 2023   1456 Cardiology consulted regarding patient's persistent chest dictation software was used during the making of this note. This software is not perfect and grammatical and other typographical errors may be present. This note has not been completely proofread for errors.       Deon Mccain, DO  09/29/23 [id number] [facility], DO  09/29/23 0300

## 2023-09-29 NOTE — ED TRIAGE NOTES
Pt coming from home via ems, Pt co of chest pain that started about and hour ago, Pt reports pain 8 out of 10, ems gave 324 Asprin, .4mg nitroglycerin, Pt bp dropped to 60/40, ems gave 800cc of ns and bp came back up to 120/74, pt has hx of aortic aneurism 3 month ago

## 2023-09-29 NOTE — H&P
No    Drug use: No    Sexual activity: Not on file   Other Topics Concern    Not on file   Social History Narrative    Not on file     Social Determinants of Health     Financial Resource Strain: Not on file   Food Insecurity: Not on file   Transportation Needs: Not on file   Physical Activity: Not on file   Stress: Not on file   Social Connections: Not on file   Intimate Partner Violence: Not on file   Housing Stability: Not on file     Family History   Problem Relation Age of Onset    Alcohol Abuse Father     Depression Mother     Depression Sister     Depression Brother     Depression Brother     Depression Sister     Depression Sister       Prior to Admission medications    Medication Sig Start Date End Date Taking? Authorizing Provider   amphetamine-dextroamphetamine (ADDERALL) 30 MG tablet 1 po TID for June 2019 Indications: Recurring Sleep Episodes During the Day 6/8/19   Ar Automatic Reconciliation   apixaban (ELIQUIS) 5 MG TABS tablet Take 5 mg by mouth 2 times daily 12/10/21   Ar Automatic Reconciliation   busPIRone (BUSPAR) 15 MG tablet Take 15 mg by mouth daily    Ar Automatic Reconciliation   gabapentin (NEURONTIN) 300 MG capsule Take 300 mg by mouth 3 times daily. Ar Automatic Reconciliation   metoprolol succinate (TOPROL XL) 50 MG extended release tablet TAKE ONE TABLET BY MOUTH EVERY DAY 9/25/18   Ar Automatic Reconciliation   nitroGLYCERIN (NITROSTAT) 0.4 MG SL tablet Place 0.4 mg under the tongue 1/11/19   Ar Automatic Reconciliation   omeprazole (PRILOSEC) 40 MG delayed release capsule Take 40 mg by mouth daily 10/12/18   Ar Automatic Reconciliation   sertraline (ZOLOFT) 100 MG tablet Take 200 mg by mouth daily 4/9/19   Ar Automatic Reconciliation       Review of Systems  Review of Systems   Constitutional: Negative. HENT: Negative. Eyes: Negative. Cardiovascular:  Positive for leg swelling.         Leg swelling in the last couple months- per patient, doc said it was \"vascular\"

## 2023-09-30 ENCOUNTER — APPOINTMENT (OUTPATIENT)
Dept: NON INVASIVE DIAGNOSTICS | Age: 78
DRG: 247 | End: 2023-09-30
Payer: MEDICARE

## 2023-09-30 VITALS
WEIGHT: 151 LBS | RESPIRATION RATE: 17 BRPM | HEART RATE: 68 BPM | HEIGHT: 70 IN | BODY MASS INDEX: 21.62 KG/M2 | OXYGEN SATURATION: 95 % | SYSTOLIC BLOOD PRESSURE: 114 MMHG | DIASTOLIC BLOOD PRESSURE: 90 MMHG | TEMPERATURE: 97.7 F

## 2023-09-30 PROBLEM — I20.0 UNSTABLE ANGINA (HCC): Status: ACTIVE | Noted: 2023-09-30

## 2023-09-30 LAB
ANION GAP SERPL CALC-SCNC: 6 MMOL/L (ref 2–11)
BUN SERPL-MCNC: 22 MG/DL (ref 8–23)
CALCIUM SERPL-MCNC: 8.4 MG/DL (ref 8.3–10.4)
CHLORIDE SERPL-SCNC: 114 MMOL/L (ref 101–110)
CO2 SERPL-SCNC: 25 MMOL/L (ref 21–32)
CREAT SERPL-MCNC: 1.1 MG/DL (ref 0.8–1.5)
ECHO AO ASC DIAM: 4.1 CM
ECHO AO ASCENDING AORTA INDEX: 2.22 CM/M2
ECHO AO ROOT DIAM: 3.6 CM
ECHO AO ROOT INDEX: 1.95 CM/M2
ECHO AV AREA PEAK VELOCITY: 2 CM2
ECHO AV AREA VTI: 2.1 CM2
ECHO AV AREA/BSA PEAK VELOCITY: 1.1 CM2/M2
ECHO AV AREA/BSA VTI: 1.1 CM2/M2
ECHO AV MEAN GRADIENT: 3 MMHG
ECHO AV MEAN GRADIENT: 3 MMHG
ECHO AV MEAN VELOCITY: 0.8 M/S
ECHO AV PEAK GRADIENT: 6 MMHG
ECHO AV PEAK VELOCITY: 1.2 M/S
ECHO AV VELOCITY RATIO: 0.67
ECHO AV VTI: 25.6 CM
ECHO BSA: 1.84 M2
ECHO IVC PROX: 2 CM
ECHO LA AREA 4C: 15.8 CM2
ECHO LA DIAMETER INDEX: 1.51 CM/M2
ECHO LA DIAMETER: 2.8 CM
ECHO LA MAJOR AXIS: 5.1 CM
ECHO LA TO AORTIC ROOT RATIO: 0.78
ECHO LA VOL 4C: 39 ML (ref 18–58)
ECHO LA VOLUME INDEX A4C: 21 ML/M2 (ref 16–34)
ECHO LV E' LATERAL VELOCITY: 8 CM/S
ECHO LV E' SEPTAL VELOCITY: 6 CM/S
ECHO LV FRACTIONAL SHORTENING: 32 % (ref 28–44)
ECHO LV INTERNAL DIMENSION DIASTOLE INDEX: 3.03 CM/M2
ECHO LV INTERNAL DIMENSION DIASTOLIC: 5.6 CM (ref 4.2–5.9)
ECHO LV INTERNAL DIMENSION SYSTOLIC INDEX: 2.05 CM/M2
ECHO LV INTERNAL DIMENSION SYSTOLIC: 3.8 CM
ECHO LV IVSD: 1.1 CM (ref 0.6–1)
ECHO LV MASS 2D: 249.3 G (ref 88–224)
ECHO LV MASS INDEX 2D: 134.8 G/M2 (ref 49–115)
ECHO LV POSTERIOR WALL DIASTOLIC: 1.1 CM (ref 0.6–1)
ECHO LV RELATIVE WALL THICKNESS RATIO: 0.39
ECHO LVOT AREA: 3.1 CM2
ECHO LVOT AV VTI INDEX: 0.68
ECHO LVOT DIAM: 2 CM
ECHO LVOT MEAN GRADIENT: 1 MMHG
ECHO LVOT PEAK GRADIENT: 3 MMHG
ECHO LVOT PEAK VELOCITY: 0.8 M/S
ECHO LVOT STROKE VOLUME INDEX: 29.5 ML/M2
ECHO LVOT SV: 54.6 ML
ECHO LVOT VTI: 17.4 CM
ECHO MV A VELOCITY: 0.55 M/S
ECHO MV E DECELERATION TIME (DT): 121 MS
ECHO MV E VELOCITY: 0.44 M/S
ECHO MV E/A RATIO: 0.8
ECHO MV E/E' LATERAL: 5.5
ECHO MV E/E' RATIO (AVERAGED): 6.42
ECHO MV E/E' SEPTAL: 7.33
ERYTHROCYTE [DISTWIDTH] IN BLOOD BY AUTOMATED COUNT: 14.6 % (ref 11.9–14.6)
EST. AVERAGE GLUCOSE BLD GHB EST-MCNC: 108 MG/DL
GLUCOSE SERPL-MCNC: 87 MG/DL (ref 65–100)
HBA1C MFR BLD: 5.4 % (ref 4.8–5.6)
HCT VFR BLD AUTO: 33.8 % (ref 41.1–50.3)
HGB BLD-MCNC: 10.8 G/DL (ref 13.6–17.2)
MAGNESIUM SERPL-MCNC: 1.9 MG/DL (ref 1.8–2.4)
MCH RBC QN AUTO: 28.9 PG (ref 26.1–32.9)
MCHC RBC AUTO-ENTMCNC: 32 G/DL (ref 31.4–35)
MCV RBC AUTO: 90.4 FL (ref 82–102)
NRBC # BLD: 0 K/UL (ref 0–0.2)
PLATELET # BLD AUTO: 145 K/UL (ref 150–450)
PMV BLD AUTO: 9.8 FL (ref 9.4–12.3)
POTASSIUM SERPL-SCNC: 3.4 MMOL/L (ref 3.5–5.1)
RBC # BLD AUTO: 3.74 M/UL (ref 4.23–5.6)
SODIUM SERPL-SCNC: 145 MMOL/L (ref 133–143)
WBC # BLD AUTO: 4.5 K/UL (ref 4.3–11.1)

## 2023-09-30 PROCEDURE — 36415 COLL VENOUS BLD VENIPUNCTURE: CPT

## 2023-09-30 PROCEDURE — 6360000004 HC RX CONTRAST MEDICATION: Performed by: INTERNAL MEDICINE

## 2023-09-30 PROCEDURE — 93306 TTE W/DOPPLER COMPLETE: CPT | Performed by: INTERNAL MEDICINE

## 2023-09-30 PROCEDURE — C8929 TTE W OR WO FOL WCON,DOPPLER: HCPCS

## 2023-09-30 PROCEDURE — 1100000000 HC RM PRIVATE

## 2023-09-30 PROCEDURE — 85027 COMPLETE CBC AUTOMATED: CPT

## 2023-09-30 PROCEDURE — 83735 ASSAY OF MAGNESIUM: CPT

## 2023-09-30 PROCEDURE — 99238 HOSP IP/OBS DSCHRG MGMT 30/<: CPT | Performed by: INTERNAL MEDICINE

## 2023-09-30 PROCEDURE — 80048 BASIC METABOLIC PNL TOTAL CA: CPT

## 2023-09-30 PROCEDURE — G0378 HOSPITAL OBSERVATION PER HR: HCPCS

## 2023-09-30 PROCEDURE — 6370000000 HC RX 637 (ALT 250 FOR IP)

## 2023-09-30 PROCEDURE — 6370000000 HC RX 637 (ALT 250 FOR IP): Performed by: INTERNAL MEDICINE

## 2023-09-30 PROCEDURE — 83036 HEMOGLOBIN GLYCOSYLATED A1C: CPT

## 2023-09-30 PROCEDURE — 2580000003 HC RX 258: Performed by: INTERNAL MEDICINE

## 2023-09-30 RX ORDER — ATORVASTATIN CALCIUM 40 MG/1
40 TABLET, FILM COATED ORAL NIGHTLY
Qty: 30 TABLET | Refills: 3 | Status: SHIPPED | OUTPATIENT
Start: 2023-09-30

## 2023-09-30 RX ORDER — LISINOPRIL 2.5 MG/1
2.5 TABLET ORAL DAILY
Qty: 30 TABLET | Refills: 3 | Status: SHIPPED | OUTPATIENT
Start: 2023-09-30

## 2023-09-30 RX ORDER — ASPIRIN 81 MG/1
81 TABLET, CHEWABLE ORAL DAILY
Qty: 5 TABLET | Refills: 0 | Status: SHIPPED | OUTPATIENT
Start: 2023-09-30 | End: 2023-10-05

## 2023-09-30 RX ORDER — CLOPIDOGREL BISULFATE 75 MG/1
75 TABLET ORAL DAILY
Qty: 30 TABLET | Refills: 11 | Status: SHIPPED | OUTPATIENT
Start: 2023-09-30

## 2023-09-30 RX ADMIN — SODIUM CHLORIDE, PRESERVATIVE FREE 0.75 ML: 5 INJECTION INTRAVENOUS at 10:45

## 2023-09-30 RX ADMIN — BUSPIRONE HYDROCHLORIDE 15 MG: 10 TABLET ORAL at 10:31

## 2023-09-30 RX ADMIN — SERTRALINE 200 MG: 100 TABLET, FILM COATED ORAL at 10:33

## 2023-09-30 RX ADMIN — ASPIRIN 81 MG: 81 TABLET, CHEWABLE ORAL at 10:30

## 2023-09-30 RX ADMIN — PANTOPRAZOLE SODIUM 40 MG: 40 TABLET, DELAYED RELEASE ORAL at 06:07

## 2023-09-30 RX ADMIN — APIXABAN 5 MG: 5 TABLET, FILM COATED ORAL at 10:31

## 2023-09-30 RX ADMIN — GABAPENTIN 300 MG: 300 CAPSULE ORAL at 10:32

## 2023-09-30 RX ADMIN — LISINOPRIL 2.5 MG: 5 TABLET ORAL at 10:33

## 2023-09-30 RX ADMIN — CLOPIDOGREL BISULFATE 75 MG: 75 TABLET ORAL at 10:31

## 2023-09-30 RX ADMIN — METOPROLOL SUCCINATE 50 MG: 25 TABLET, FILM COATED, EXTENDED RELEASE ORAL at 10:32

## 2023-09-30 RX ADMIN — POTASSIUM CHLORIDE 40 MEQ: 1500 TABLET, EXTENDED RELEASE ORAL at 06:07

## 2023-09-30 NOTE — PROGRESS NOTES
When EMS arrived around 1030 patient was finishing up his ECHO and needed to be prepared for DC. EMS left to take care of another patient. Milly Santillan called at [phone] to send another team to  patient and they gave an ETA of 1215.

## 2023-09-30 NOTE — CARE COORDINATION
Discharge order is in. Pt presented to the [facility] ED via EMS c/o non-radiating and non-reproducible CP. Pt is now medically stable for discharge. Pt lives with his wife in a mobile home with a ramped entrance. Pt is WC bound. Reported that his wife is also \"basically bed bound\" due to her COPD. Pt is on RA. Has a [facility] aid that is \"supposed\" to come 7 days a week, but \"shows up about 5 days week. \" He said that a new person from the agency is supposed to start on Monday with him. PCP established. [employer] Medicare and Medicaid established and able to afford home meds. Will arrange transport and update note with time when arranged. 0928-[employer] [facility] arranged for 1030  time. No other discharge needs identified. Tx goals met. 09/30/23 0915   Service Assessment   Patient Orientation Alert and Oriented   Cognition Alert   History Provided By Patient   Primary Caregiver Self   Support Systems Spouse/Significant Other;Children;Family Members;Faith/[place] Community;Friends/Neighbors;Home Care Staff   PCP Verified by CM Yes   Prior Functional Level Assistance with the following:   Current Functional Level Assistance with the following:   Can patient return to prior living arrangement Yes   Ability to make needs known: Good   Family able to assist with home care needs: Yes   Would you like for me to discuss the discharge plan with any other family members/significant others, and if so, who? No   Financial Resources Medicare;Medicaid   Community Resources None   Social/Functional History   Lives With Spouse   Type of Home Mobile home   Home Access Ramped entrance   [address] Help From Family   ADL Assistance Needs assistance   Ambulation Assistance Needs assistance   Transfer Assistance Independent   Active  No   Occupation Retired   Discharge Planning   Current Services Prior To Admission [facility] [address] [facility]   DME Ordered?  No   Potential Assistance Purchasing Medications No   Type of [address]   Patient expects to be discharged to: Trailer/mobile home   Services At/After Discharge   Transition of Care Consult (CM Consult) Discharge [facility] Discharge None   [employer] Resource Information Provided? No   Mode of Transport at Discharge BLS   Confirm Follow Up Transport Wheelchair [employer]   Condition of Participation: Discharge Planning   The Patient and/or Patient Representative was provided with a Choice of Provider? Patient   The Patient and/Or Patient Representative agree with the Discharge Plan? Yes   Freedom of Choice list was provided with basic dialogue that supports the patient's individualized plan of care/goals, treatment preferences, and shares the quality data associated with the providers?   Yes

## 2023-09-30 NOTE — DISCHARGE SUMMARY
[facility] Cardiology Discharge Summary     Patient ID:  [id number] Patterson  [id number]  69 y.o.  1945    Admit date: 9/29/2023    Discharge date:  9/30/2023    Admitting Physician: Kayla Pantoja MD     Discharge Physician:  Dr. Jose Enriquez    Admission Diagnoses: Chest pain [R07.9]  Elevated troponin I level [R77.8]  Constipation, unspecified constipation type [K59.00]  Chest pain, unspecified type [R07.9]  Unstable angina ([address]) [I20.0]    Discharge Diagnoses:   Patient Active Problem List    Diagnosis    Chest pain    Elevated troponin I level    PAF (paroxysmal atrial fibrillation)     Suspected sleep apnea    DDD (degenerative disc disease), lumbar    Recurrent depression     Hyperlipidemia    Esophageal stenosis    Narcolepsy    Prostate cancer    Atherosclerosis of coronary artery    HTN (hypertension)     Cardiology Procedures:  Left heart catheterization with PCI  EchoCardiogram  Consults: none    Hospital Course: Patient presented to the ED with c/o CP. Pt was found to have elevated hsTroponin and was subsequently scheduled for a LHC at [facility] on 9/29/23. Patient underwent cardiac catheterization by Dr. Jose Enriquez. Patient was found to have a 70% stenosis of the pLAD that was stented with a 3.0 x 18mm Sherwin ROSINA with 0% residual stenosis. Patient was found to have a 80% stenosis of the dLAD that was stented with a 2.5 x 15mm Sherwin ROSINA with 0% residual stenosis. Patients right coronary is a large ectatic vessel which is extremely tortuous in the mid vessel. The vessel essentially doubles back on itself with an irregular 40 to 50% lesion contained within the extreme tortuosity which will be left to medical therapy. Patient tolerated the procedure well and was taken to the Telemetry floor for recovery. ECHO showed:   Left Ventricle Normal left ventricular systolic function with a visually estimated EF of 55 - 60%. Left ventricle size is normal. Mildly increased wall thickness.  Normal wall

## 2023-10-02 ENCOUNTER — TELEPHONE (OUTPATIENT)
Age: 78
End: 2023-10-02

## 2023-10-05 ENCOUNTER — TELEPHONE (OUTPATIENT)
Age: 78
End: 2023-10-05

## 2023-10-05 NOTE — TELEPHONE ENCOUNTER
Pt states that [facility] states that the Atorvastatin (prescription from the hospital)  and pravastatin sodium (from Dr. Simi Zamora) is the same thing and needs to know what to stop taking

## 2023-10-05 NOTE — TELEPHONE ENCOUNTER
Per hospital encounter, pt should be taking atorvastatin 40 mg daily. Left voicemail for pt to return call and ask for Masoud Leung.

## 2023-10-12 LAB — ACT BLD: 510 SECS (ref 79–138)

## 2023-10-15 ASSESSMENT — ENCOUNTER SYMPTOMS
ABDOMINAL PAIN: 0
COUGH: 0
DIARRHEA: 0
ABDOMINAL DISTENTION: 0
PHOTOPHOBIA: 0
SORE THROAT: 0
CONSTIPATION: 0
SHORTNESS OF BREATH: 0

## 2023-10-15 NOTE — PROGRESS NOTES
Negative for adenopathy. Does not bruise/bleed easily. Psychiatric/Behavioral:  Negative for agitation, behavioral problems, dysphoric mood and hallucinations. The patient is not nervous/anxious. PHYSICAL EXAM:  Vitals:    10/17/23 1221   BP: 134/88   Pulse: 64   Weight: 155 lb (70.3 kg)   Height: 5' 10\" (1.778 m)      Wt Readings from Last 3 Encounters:   10/17/23 155 lb (70.3 kg)   09/30/23 151 lb (68.5 kg)   07/28/22 149 lb (67.6 kg)      BP Readings from Last 3 Encounters:   10/17/23 134/88   09/30/23 (!) 114/90   07/28/22 132/84        Physical Exam  Constitutional:       Appearance: Normal appearance. He is normal weight. HENT:      Head: Normocephalic and atraumatic. Nose: Nose normal.      Mouth/Throat:      Mouth: Mucous membranes are moist.      Pharynx: Oropharynx is clear. Eyes:      Extraocular Movements: Extraocular movements intact. Pupils: Pupils are equal, round, and reactive to light. Neck:      Vascular: No carotid bruit or JVD. Cardiovascular:      Rate and Rhythm: Normal rate and regular rhythm. Heart sounds: No murmur heard. No friction rub. No gallop. Comments: Right radial healing well  Pulmonary:      Effort: Pulmonary effort is normal.      Breath sounds: Normal breath sounds. No wheezing or rhonchi. Abdominal:      General: Abdomen is flat. Bowel sounds are normal. There is no distension. Palpations: Abdomen is soft. Tenderness: There is no abdominal tenderness. Musculoskeletal:         General: No swelling. Normal range of motion. Cervical back: Normal range of motion and neck supple. No tenderness. Skin:     General: Skin is warm and dry. Neurological:      General: No focal deficit present. Mental Status: He is alert and oriented to person, place, and time. Mental status is at baseline.    Psychiatric:         Mood and Affect: Mood normal.         Behavior: Behavior normal.          Medical problems and test results

## 2023-10-17 ENCOUNTER — OFFICE VISIT (OUTPATIENT)
Age: 78
End: 2023-10-17
Payer: MEDICARE

## 2023-10-17 VITALS
BODY MASS INDEX: 22.19 KG/M2 | WEIGHT: 155 LBS | DIASTOLIC BLOOD PRESSURE: 88 MMHG | HEART RATE: 64 BPM | SYSTOLIC BLOOD PRESSURE: 134 MMHG | HEIGHT: 70 IN

## 2023-10-17 DIAGNOSIS — E78.2 MIXED HYPERLIPIDEMIA: ICD-10-CM

## 2023-10-17 DIAGNOSIS — I10 PRIMARY HYPERTENSION: ICD-10-CM

## 2023-10-17 DIAGNOSIS — I25.10 ATHEROSCLEROSIS OF NATIVE CORONARY ARTERY OF NATIVE HEART WITHOUT ANGINA PECTORIS: ICD-10-CM

## 2023-10-17 DIAGNOSIS — I48.0 PAF (PAROXYSMAL ATRIAL FIBRILLATION) (HCC): Primary | ICD-10-CM

## 2023-10-17 DIAGNOSIS — R29.818 SUSPECTED SLEEP APNEA: ICD-10-CM

## 2023-10-17 PROCEDURE — G8427 DOCREV CUR MEDS BY ELIG CLIN: HCPCS | Performed by: INTERNAL MEDICINE

## 2023-10-17 PROCEDURE — 1123F ACP DISCUSS/DSCN MKR DOCD: CPT | Performed by: INTERNAL MEDICINE

## 2023-10-17 PROCEDURE — G8484 FLU IMMUNIZE NO ADMIN: HCPCS | Performed by: INTERNAL MEDICINE

## 2023-10-17 PROCEDURE — 1036F TOBACCO NON-USER: CPT | Performed by: INTERNAL MEDICINE

## 2023-10-17 PROCEDURE — G8420 CALC BMI NORM PARAMETERS: HCPCS | Performed by: INTERNAL MEDICINE

## 2023-10-17 PROCEDURE — 1111F DSCHRG MED/CURRENT MED MERGE: CPT | Performed by: INTERNAL MEDICINE

## 2023-10-17 PROCEDURE — 3075F SYST BP GE 130 - 139MM HG: CPT | Performed by: INTERNAL MEDICINE

## 2023-10-17 PROCEDURE — 3079F DIAST BP 80-89 MM HG: CPT | Performed by: INTERNAL MEDICINE

## 2023-10-17 PROCEDURE — 99214 OFFICE O/P EST MOD 30 MIN: CPT | Performed by: INTERNAL MEDICINE

## 2023-10-17 RX ORDER — ARIPIPRAZOLE 2 MG/1
2 TABLET ORAL DAILY
COMMUNITY
Start: 2023-08-17

## 2023-10-17 RX ORDER — MECLIZINE HCL 12.5 MG/1
12.5 TABLET ORAL 3 TIMES DAILY PRN
COMMUNITY
Start: 2023-07-17

## 2023-10-17 ASSESSMENT — ENCOUNTER SYMPTOMS: ROS SKIN COMMENTS: EASY BRUISABILITY

## (undated) DEVICE — BAND COMPR L24CM REG CLR PLAS HEMSTAT EXT HK AND LOOP RETEN

## (undated) DEVICE — CATHETER GUID 067IN 6FR 100CM VASC EXTRA BKUP 4 COR W O

## (undated) DEVICE — CATHETER COR DIAG PIGTAILS PIG 145 CRV 5FR 110CM 6 SIDE H

## (undated) DEVICE — GUIDEWIRE 035IN 210CM PTFE COAT FIX COR J TIP 15MM FIRM BODY

## (undated) DEVICE — CATH BLLN ANGIO 2.50X12MM SC EUPHORA RX

## (undated) DEVICE — RUNTHROUGH NS EXTRA FLOPPY PTCA GUIDEWIRE: Brand: RUNTHROUGH

## (undated) DEVICE — RADIFOCUS OPTITORQUE ANGIOGRAPHIC CATHETER: Brand: OPTITORQUE

## (undated) DEVICE — CATH BLLN ANGIO 3.25X12MM NC EUPHORIA RX

## (undated) DEVICE — CANNULA NSL ORAL AD FOR CAPNOFLEX CO2 O2 AIRLFE

## (undated) DEVICE — KENDALL RADIOLUCENT FOAM MONITORING ELECTRODE RECTANGULAR SHAPE: Brand: KENDALL

## (undated) DEVICE — HEMOSTASIS VALVE PHD SM BORE WITH SPRING

## (undated) DEVICE — CATHETER GUID EXTRA BACKUP 3.5 0.070IN 6FR 100CM VISTA BRITE TIP

## (undated) DEVICE — GLIDESHEATH SLENDER STAINLESS STEEL KIT: Brand: GLIDESHEATH SLENDER

## (undated) DEVICE — CATHETER COR DIAG JUDKINS L 5.0 CRV 5FR 100CM 0 SIDE H

## (undated) DEVICE — CATH BLLN ANGIO 2.75X8MM NC EUPHORIA RX

## (undated) DEVICE — CONNECTOR TBNG OD5-7MM O2 END DISP

## (undated) DEVICE — BLOCK BITE AD 60FR W/ VELC STRP ADDRESSES MOST PT AND